# Patient Record
Sex: FEMALE | Race: BLACK OR AFRICAN AMERICAN | Employment: OTHER | URBAN - METROPOLITAN AREA
[De-identification: names, ages, dates, MRNs, and addresses within clinical notes are randomized per-mention and may not be internally consistent; named-entity substitution may affect disease eponyms.]

---

## 2017-03-09 ENCOUNTER — HOSPITAL ENCOUNTER (OUTPATIENT)
Dept: ULTRASOUND IMAGING | Age: 42
Discharge: HOME | End: 2017-03-09
Attending: SPECIALIST | Admitting: SPECIALIST

## 2017-03-09 DIAGNOSIS — R10.13 ABDOMINAL PAIN, EPIGASTRIC: ICD-10-CM

## 2017-03-31 ENCOUNTER — HOSPITAL ENCOUNTER (OUTPATIENT)
Dept: GENERAL RADIOLOGY | Age: 42
Discharge: HOME | End: 2017-03-31
Attending: SPECIALIST | Admitting: SPECIALIST

## 2017-03-31 DIAGNOSIS — R10.13 ABDOMINAL PAIN, EPIGASTRIC: ICD-10-CM

## 2018-09-22 ENCOUNTER — APPOINTMENT (OUTPATIENT)
Dept: GENERAL RADIOLOGY | Age: 43
End: 2018-09-22
Payer: COMMERCIAL

## 2018-09-22 ENCOUNTER — HOSPITAL ENCOUNTER (EMERGENCY)
Age: 43
Discharge: HOME | End: 2018-09-22
Payer: COMMERCIAL

## 2018-09-22 VITALS
HEART RATE: 88 BPM | DIASTOLIC BLOOD PRESSURE: 90 MMHG | WEIGHT: 114 LBS | SYSTOLIC BLOOD PRESSURE: 138 MMHG | RESPIRATION RATE: 16 BRPM | TEMPERATURE: 98.5 F | HEIGHT: 66 IN | BODY MASS INDEX: 18.32 KG/M2 | OXYGEN SATURATION: 100 %

## 2018-09-22 DIAGNOSIS — M79.601 RIGHT ARM PAIN: Primary | ICD-10-CM

## 2018-09-22 PROCEDURE — 73030 X-RAY EXAM OF SHOULDER: CPT

## 2018-09-22 PROCEDURE — 73080 X-RAY EXAM OF ELBOW: CPT

## 2018-09-22 PROCEDURE — 99283 EMERGENCY DEPT VISIT LOW MDM: CPT

## 2018-09-22 ASSESSMENT — PAIN DESCRIPTION - LOCATION: LOCATION: ARM;ELBOW;SHOULDER

## 2018-09-22 ASSESSMENT — PAIN DESCRIPTION - ORIENTATION: ORIENTATION: RIGHT

## 2018-09-22 ASSESSMENT — PAIN SCALES - GENERAL: PAINLEVEL_OUTOF10: 9

## 2018-09-22 ASSESSMENT — PAIN DESCRIPTION - PAIN TYPE: TYPE: ACUTE PAIN

## 2018-09-22 NOTE — ED PROVIDER NOTES
eMERGENCY dEPARTMENT eNCOUnter      PCP: Marija Barahona MD    CHIEF COMPLAINT    Chief Complaint   Patient presents with    Arm Pain     R arm injury after trying to catch her grandmother from sliding out of a recliner, weighs approx 400lb       HPI    Felix Tubbs is a 37 y.o. female who presents with Concerns for right arm pain starting yesterday. States that she helps take care of her grandmother who weighs approximately 400 pounds she was sliding out of her recliner chair and she had to help put her back into the chair. She's had pain to her right arm since. Took some Tylenol today. She is allergic to ibuprofen, denies any numbness or tingling has a history of rotator cuff problem on this side but has not had any calms with her biceps area which is where she is having her pain. Skin intact. No distal numbness, tingling, weakness, or functional deficit. No other pain. REVIEW OF SYSTEMS    General: Denies fever or constitutional symptoms. Cardiac: Denies chest wall injury or chest pain  Pulmonary: Denies chest wall injury or shortness of breath  GI: Denies abdomen injury or abdominal pain  Musculoskeletal:  Denies any other musculoskeletal pain or injuries, including chest wall and back. Skin:  Skin intact. Lymphatics:  No nodules or streaks.       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

## 2018-09-26 ENCOUNTER — HOSPITAL ENCOUNTER (OUTPATIENT)
Age: 43
Discharge: HOME | End: 2018-09-26
Payer: COMMERCIAL

## 2018-09-26 LAB
ALBUMIN SERPL-MCNC: 4.5 GM/DL (ref 3.4–5)
ALP BLD-CCNC: 96 IU/L (ref 40–129)
ALT SERPL-CCNC: 10 U/L (ref 10–40)
ANION GAP SERPL CALCULATED.3IONS-SCNC: 12 MMOL/L (ref 4–16)
AST SERPL-CCNC: 19 IU/L (ref 15–37)
BACTERIA: ABNORMAL /HPF
BILIRUB SERPL-MCNC: 0.4 MG/DL (ref 0–1)
BILIRUBIN URINE: NEGATIVE MG/DL
BLOOD, URINE: ABNORMAL
BUN BLDV-MCNC: 11 MG/DL (ref 6–23)
CALCIUM SERPL-MCNC: 9.1 MG/DL (ref 8.3–10.6)
CHLORIDE BLD-SCNC: 101 MMOL/L (ref 99–110)
CLARITY: ABNORMAL
CO2: 25 MMOL/L (ref 21–32)
COLOR: YELLOW
CREAT SERPL-MCNC: 1.4 MG/DL (ref 0.6–1.1)
ERYTHROCYTE SEDIMENTATION RATE: 15 MM/HR (ref 0–20)
ESTIMATED AVERAGE GLUCOSE: 108 MG/DL
GFR AFRICAN AMERICAN: 50 ML/MIN/1.73M2
GFR NON-AFRICAN AMERICAN: 41 ML/MIN/1.73M2
GLUCOSE BLD-MCNC: 102 MG/DL (ref 70–99)
GLUCOSE, URINE: NEGATIVE MG/DL
HBA1C MFR BLD: 5.4 % (ref 4.2–6.3)
HCT VFR BLD CALC: 47.7 % (ref 37–47)
HEMOGLOBIN: 15.1 GM/DL (ref 12.5–16)
KETONES, URINE: NEGATIVE MG/DL
LEUKOCYTE ESTERASE, URINE: NEGATIVE
MAGNESIUM: 2.3 MG/DL (ref 1.8–2.4)
MCH RBC QN AUTO: 31.8 PG (ref 27–31)
MCHC RBC AUTO-ENTMCNC: 31.7 % (ref 32–36)
MCV RBC AUTO: 100.4 FL (ref 78–100)
MUCUS: ABNORMAL HPF
NITRITE URINE, QUANTITATIVE: POSITIVE
PDW BLD-RTO: 12.2 % (ref 11.7–14.9)
PH, URINE: 5 (ref 5–8)
PLATELET # BLD: 261 K/CU MM (ref 140–440)
PMV BLD AUTO: 9.9 FL (ref 7.5–11.1)
POTASSIUM SERPL-SCNC: 3.6 MMOL/L (ref 3.5–5.1)
PROTEIN UA: 30 MG/DL
RBC # BLD: 4.75 M/CU MM (ref 4.2–5.4)
RBC URINE: 8 /HPF (ref 0–6)
SODIUM BLD-SCNC: 138 MMOL/L (ref 135–145)
SPECIFIC GRAVITY UA: 1.02 (ref 1–1.03)
SQUAMOUS EPITHELIAL: 1 /HPF
T4 FREE: 0.98 NG/DL (ref 0.9–1.8)
TOTAL PROTEIN: 7.3 GM/DL (ref 6.4–8.2)
TRICHOMONAS: ABNORMAL /HPF
TSH HIGH SENSITIVITY: 0.92 UIU/ML (ref 0.27–4.2)
URIC ACID: 4.5 MG/DL (ref 2.6–6)
UROBILINOGEN, URINE: 1 MG/DL (ref 0.2–1)
VITAMIN D 25-HYDROXY: 17.02 NG/ML
WBC # BLD: 5.2 K/CU MM (ref 4–10.5)
WBC UA: <1 /HPF (ref 0–5)

## 2018-09-26 PROCEDURE — 85652 RBC SED RATE AUTOMATED: CPT

## 2018-09-26 PROCEDURE — 36415 COLL VENOUS BLD VENIPUNCTURE: CPT

## 2018-09-26 PROCEDURE — 83735 ASSAY OF MAGNESIUM: CPT

## 2018-09-26 PROCEDURE — 81001 URINALYSIS AUTO W/SCOPE: CPT

## 2018-09-26 PROCEDURE — 85027 COMPLETE CBC AUTOMATED: CPT

## 2018-09-26 PROCEDURE — 84439 ASSAY OF FREE THYROXINE: CPT

## 2018-09-26 PROCEDURE — 82306 VITAMIN D 25 HYDROXY: CPT

## 2018-09-26 PROCEDURE — 80053 COMPREHEN METABOLIC PANEL: CPT

## 2018-09-26 PROCEDURE — 84443 ASSAY THYROID STIM HORMONE: CPT

## 2018-09-26 PROCEDURE — 83036 HEMOGLOBIN GLYCOSYLATED A1C: CPT

## 2018-09-26 PROCEDURE — 84550 ASSAY OF BLOOD/URIC ACID: CPT

## 2018-10-29 ENCOUNTER — APPOINTMENT (OUTPATIENT)
Dept: GENERAL RADIOLOGY | Age: 43
End: 2018-10-29
Payer: COMMERCIAL

## 2018-10-29 ENCOUNTER — HOSPITAL ENCOUNTER (EMERGENCY)
Age: 43
Discharge: HOME | End: 2018-10-29
Payer: COMMERCIAL

## 2018-10-29 VITALS
TEMPERATURE: 98.6 F | BODY MASS INDEX: 18.32 KG/M2 | OXYGEN SATURATION: 100 % | RESPIRATION RATE: 16 BRPM | SYSTOLIC BLOOD PRESSURE: 151 MMHG | DIASTOLIC BLOOD PRESSURE: 96 MMHG | HEIGHT: 66 IN | HEART RATE: 94 BPM | WEIGHT: 114 LBS

## 2018-10-29 DIAGNOSIS — S69.92XA INJURY OF LEFT HAND, INITIAL ENCOUNTER: Primary | ICD-10-CM

## 2018-10-29 PROCEDURE — 73130 X-RAY EXAM OF HAND: CPT

## 2018-10-29 PROCEDURE — 6370000000 HC RX 637 (ALT 250 FOR IP): Performed by: PHYSICIAN ASSISTANT

## 2018-10-29 PROCEDURE — 73110 X-RAY EXAM OF WRIST: CPT

## 2018-10-29 PROCEDURE — 99283 EMERGENCY DEPT VISIT LOW MDM: CPT

## 2018-10-29 RX ORDER — HYDROCODONE BITARTRATE AND ACETAMINOPHEN 5; 325 MG/1; MG/1
1 TABLET ORAL ONCE
Status: COMPLETED | OUTPATIENT
Start: 2018-10-29 | End: 2018-10-29

## 2018-10-29 RX ORDER — HYDROCODONE BITARTRATE AND ACETAMINOPHEN 5; 325 MG/1; MG/1
1 TABLET ORAL EVERY 8 HOURS PRN
Qty: 8 TABLET | Refills: 0 | Status: SHIPPED | OUTPATIENT
Start: 2018-10-29 | End: 2018-11-05

## 2018-10-29 RX ADMIN — HYDROCODONE BITARTRATE AND ACETAMINOPHEN 1 TABLET: 5; 325 TABLET ORAL at 22:45

## 2018-10-29 ASSESSMENT — PAIN DESCRIPTION - ORIENTATION: ORIENTATION: LEFT

## 2018-10-29 ASSESSMENT — PAIN DESCRIPTION - LOCATION: LOCATION: WRIST

## 2018-10-29 ASSESSMENT — PAIN SCALES - GENERAL
PAINLEVEL_OUTOF10: 10
PAINLEVEL_OUTOF10: 6

## 2018-10-29 ASSESSMENT — PAIN DESCRIPTION - PAIN TYPE: TYPE: ACUTE PAIN

## 2018-11-14 ENCOUNTER — OFFICE VISIT (OUTPATIENT)
Dept: ORTHOPEDIC SURGERY | Age: 43
End: 2018-11-14
Payer: COMMERCIAL

## 2018-11-14 VITALS — BODY MASS INDEX: 17.36 KG/M2 | RESPIRATION RATE: 12 BRPM | HEIGHT: 66 IN | WEIGHT: 108 LBS

## 2018-11-14 DIAGNOSIS — M75.81 RIGHT ROTATOR CUFF TENDONITIS: Primary | ICD-10-CM

## 2018-11-14 PROCEDURE — G8484 FLU IMMUNIZE NO ADMIN: HCPCS | Performed by: PHYSICIAN ASSISTANT

## 2018-11-14 PROCEDURE — 99243 OFF/OP CNSLTJ NEW/EST LOW 30: CPT | Performed by: PHYSICIAN ASSISTANT

## 2018-11-14 PROCEDURE — G8419 CALC BMI OUT NRM PARAM NOF/U: HCPCS | Performed by: PHYSICIAN ASSISTANT

## 2018-11-14 PROCEDURE — G8427 DOCREV CUR MEDS BY ELIG CLIN: HCPCS | Performed by: PHYSICIAN ASSISTANT

## 2018-11-14 PROCEDURE — 20610 DRAIN/INJ JOINT/BURSA W/O US: CPT | Performed by: PHYSICIAN ASSISTANT

## 2018-11-14 ASSESSMENT — ENCOUNTER SYMPTOMS
EYE DISCHARGE: 1
BACK PAIN: 1
DIARRHEA: 1
WHEEZING: 1
ABDOMINAL PAIN: 1
BLOOD IN STOOL: 1
VOMITING: 1
SINUS PAIN: 1
COUGH: 1
EYE REDNESS: 1
CONSTIPATION: 1
ROS SKIN COMMENTS: ITCHING
NAUSEA: 1

## 2018-11-14 NOTE — PROGRESS NOTES
Review of Systems   Constitutional: Positive for activity change and chills. HENT: Positive for sinus pain. Eyes: Positive for discharge, redness and visual disturbance. Respiratory: Positive for cough and wheezing. Cardiovascular: Positive for palpitations and leg swelling. Gastrointestinal: Positive for abdominal pain, blood in stool, constipation, diarrhea, nausea and vomiting. Genitourinary: Positive for frequency. Musculoskeletal: Positive for arthralgias, back pain and neck pain. Skin: Negative for rash and wound. itching   Allergic/Immunologic: Positive for environmental allergies. Neurological: Positive for dizziness, numbness and headaches. Psychiatric/Behavioral: Positive for dysphoric mood and sleep disturbance. The patient is nervous/anxious. HPI:  Babita Foreman is a 37y.o. year old female who complains of 8/10 sharp, aching global Right shoulder pain, with weakness. She states that this has been going on for several months and she has no known injury. She states that the pain does radiate down her arm to her elbow and sometimes to her thumb. Occasionally she states that she does have some tingling and numbness in the arm. She has been told that she has cervical spine arthritis and she is unsure if this is coming from her neck or her shoulder currently. She is right-hand dominant. She has not done any therapy and has not had an injection before in that shoulder. Active motion makes her shoulder pain worse. Resting the shoulder does help mildly with the pain.      The patient was referred to our office from Dr. 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 No pertinent family history. Social History     Social History    Marital status:      Spouse name: N/A    Number of children: N/A    Years of education: N/A     Social History Main Topics    Smoking status: Current Every Day Smoker     Packs/day: 1.00     Years: 20.00     Types: Cigarettes    Smokeless tobacco: Never Used    Alcohol use Yes    Drug use: No    Sexual activity: No     Other Topics Concern    None     Social History Narrative    None       Current Outpatient Prescriptions   Medication Sig Dispense Refill    naproxen (NAPROSYN) 500 MG tablet Take 1 tablet by mouth 2 times daily 60 tablet 0    albuterol sulfate HFA (PROVENTIL HFA) 108 (90 BASE) MCG/ACT inhaler Inhale 2 puffs into the lungs every 6 hours as needed for Wheezing or Shortness of Breath 1 Inhaler 0    ondansetron (ZOFRAN ODT) 4 MG disintegrating tablet Take 1 tablet by mouth every 8 hours as needed for Nausea 15 tablet 0     No current facility-administered medications for this visit. Allergies   Allergen Reactions    Morphine And Related Hives    Ibuprofen Hives    Motrin [Ibuprofen Micronized] Hives       Review of Systems:  See above      Physical Exam:   Resp 12   Ht 5' 6\" (1.676 m)   Wt 108 lb (49 kg)   BMI 17.43 kg/m²    She demonstrates normal gait and station. Gen/Psych: Examination reveals a pleasant individual in no acute distress. The patient is oriented to time, place and person. The patient's mood and affect are appropriate.      Lymph: No enlarged supraclavicular lymph nodes on the right or left side.     Skin intact over the left and right upper extremity as well as over the neck and head region. There are no rashes, ulcerations, lesions.   There is a small hypertrophic scar on her right shoulder over the posterior lateral acromial corner.     Vascular:  2+ radial pulse in the right upper extremity, no varicosities in the left or right upper extremity      Right shoulder exam:

## 2018-11-23 ENCOUNTER — HOSPITAL ENCOUNTER (OUTPATIENT)
Dept: PHYSICAL THERAPY | Age: 43
Setting detail: THERAPIES SERIES
Discharge: HOME | End: 2018-11-23
Payer: COMMERCIAL

## 2018-11-23 PROCEDURE — 97162 PT EVAL MOD COMPLEX 30 MIN: CPT

## 2018-11-23 PROCEDURE — G8984 CARRY CURRENT STATUS: HCPCS

## 2018-11-23 PROCEDURE — G8985 CARRY GOAL STATUS: HCPCS

## 2018-11-23 PROCEDURE — 97016 VASOPNEUMATIC DEVICE THERAPY: CPT

## 2018-11-23 PROCEDURE — 97110 THERAPEUTIC EXERCISES: CPT

## 2018-11-23 ASSESSMENT — PAIN SCALES - GENERAL: PAINLEVEL_OUTOF10: 7

## 2018-11-23 ASSESSMENT — PAIN DESCRIPTION - LOCATION: LOCATION: ARM;SHOULDER;NECK

## 2018-11-23 ASSESSMENT — PAIN DESCRIPTION - PAIN TYPE: TYPE: ACUTE PAIN

## 2018-11-23 ASSESSMENT — PAIN DESCRIPTION - PROGRESSION: CLINICAL_PROGRESSION: NOT CHANGED

## 2018-11-23 ASSESSMENT — PAIN DESCRIPTION - FREQUENCY: FREQUENCY: CONTINUOUS

## 2018-11-23 ASSESSMENT — PAIN DESCRIPTION - ONSET: ONSET: SUDDEN

## 2018-11-23 ASSESSMENT — PAIN DESCRIPTION - ORIENTATION: ORIENTATION: RIGHT

## 2018-11-23 NOTE — FLOWSHEET NOTE
Outpatient Physical Therapy           [place]           [x] Phone: [phone]   Fax: [phone]  [facility] Marin[facility]           [] Phone: [phone]   Fax: [phone]    Physical Therapy Daily Treatment Note  Date:  2018    Patient Name:  Flor Barrett    :  1975  MRN: [id number]  Restrictions/Precautions: Other position/activity restrictions: none  Diagnosis:   Diagnosis: R shoulder pain  Date of Surgery:   Treatment Diagnosis: Treatment Diagnosis: R shoulder/arm pain, stiffness, weakness    Insurance/Certification information: [facility]  30 Y  Referring Physician:  Referring Practitioner: Gerard Rodriguez Doctor Visit:    Plan of care signed (Y/N):  pending  Visit# / total visits:    POC  Pain level: 7/10   Goals:       Short term goals  Time Frame for Short term goals: 3 weeks, 18  Short term goal 1: Pt will be able to report at least 25% reduction in pain/symptoms or will consider return to doc  Short term goal 2: Pt will be able to slowly advance ROM and strength w/o increased pain  Short term goal 3: Pt will be able to report improved sleeping at night   Short term goal 4: Check neural tension tests prn when shoulder pain allows  Long term goals  Time Frame for Long term goals : 6 weeks, 19  Long term goal 1: Pt will be independent w/ home program and be able to continue to manage her pain on her own at home  Long term goal 2: Pt will be able reach [place] and behind again w/ min to no pain  Long term goal 3: Pt will have AROM and strength in R UE that is at least 90% of L UE for usual activity  Long term goal 4: Pt will be able to carry groceries w/o pain         Subjective:  See eval       Any changes in Ambulatory Summary Sheet?   See eval      Objective:  See eval         Exercises:  Exercise/Equipment 18 Date Date Date          Supine:         Cane ER/IR at 45°    10x       cane press up  10x      Cane flex  2x               bicep curl 10x, stretch at bottom       scap

## 2018-11-23 NOTE — PROGRESS NOTES
Physical Therapy  Initial Assessment  Date: 2018  Patient Name: Jj De La Cruz  MRN: [id number]  : 1975     Treatment Diagnosis: R shoulder/arm pain, stiffness, weakness    Restrictions  Position Activity Restriction  Other position/activity restrictions: none    Subjective   General  Chart Reviewed: Yes  Patient assessed for rehabilitation services?: Yes  Additional Pertinent Hx: pain for a couple months now, but also has bulging disc in neck, pt describes shoulder locking like arm out of socket since injection and pain is worse since then. shoulder xray maybe some tendonitis, No doc following neck other than PCP currently, neck pain for more than 10 years and was managing ok. Referring Practitioner: Ramsay  Diagnosis: R shoulder pain  Subjective  Subjective: pain increased by sitting w/ arm dangling, sleep is miserable and cannot lay on it, goes numb, especially if gets arm up under head. Also pain w/ reaching [place] and behind, cannot carry groceries. Is L handed. for neck uses heat and ice and some meds (min help w/ meds). relief w/ ice but heat makes worse--increases stingy pain  Pain Screening  Patient Currently in Pain: Yes  Pain Assessment  Pain Assessment: 0-10  Pain Level: 7 (max 10/10 and happens daily am and late day)  Pain Type: Acute pain  Pain Location: Arm; Shoulder;Neck  Pain Orientation: Right  Pain Radiating Towards: down arm just below elbow and can go into thumb and first finger  Pain Descriptors: Aching;Burning; Sharp;Dull;Stabbing; Throbbing;Numbness;Tingling;Pins and needles  Pain Frequency: Continuous  Pain Onset: Sudden  Clinical Progression: Not changed  Effect of Pain on Daily Activities: limits lifting, reaching, sleep, sitting all as above  Patient's Stated Pain Goal: No pain  Vital Signs  Patient Currently in Pain: Yes    Vision/Hearing  Vision  Vision: Impaired (reading)    Orientation  Orientation  Overall Orientation Status: Within Normal Limits    Social/Functional History  Social/Functional History  Lives With: Family  Type of Home: House  Occupation: On disability  Leisure & Hobbies: corn hole others hair, nini  Objective     Observation/Palpation  Posture: Good  Palpation: TTP all around humeral head, down tricep and at medial epicondyle, UT and rhomb too  Observation: guarding of shoulder noted and limits movement    PROM RUE (degrees)  RUE General PROM: supine flex and abd 123 w/ pain and instability, ER 65, IR 58 --more pain w/ IR   AROM RUE (degrees)  RUE General AROM: seated shoulder flex 110 w/ pain and feeling of instability and really pain on return, abd 65, ER scratch spine of scap w/ instability noted vs T6-7 on L, IR scratch just to sacrum vs T 12 on L also w/ pain, standing elbow ext limited 18°    Strength RUE  Comment: shoulder and elbow all 4+/5 w/ some pain,  weak but B  Strength LUE  Strength LUE: WNL  Strength Other  Other:  on dynamometer 15 kg L , 5 kg R  Tone RLE  RLE Tone: Hypertonic  Tone Description: R shoulder  Additional Measures  Special Tests: Neg cerv comp and distraction, some local pain w/ Spurling B but no radic pain, + Brownlee and Cross over, Speeds too. Laxity noted w/ mild sulcus and HH sitting ant in joint w/ AP testing, neg Smith, unable to check neural tension d/t limited shoulder ROM and pain  Other: bicep diminshed L, 1+ R, brachialias 1+ B, tricep diminished R but 1+ L  Sensation  Overall Sensation Status: Impaired  Light Touch: Partial deficits in the RUE (C5 nerve pattern distrubutioin)    Assessment   Conditions Requiring Skilled Therapeutic Intervention  Body structures, Functions, Activity limitations: Decreased functional mobility ; Decreased ADL status; Decreased ROM; Decreased strength;Decreased high-level IADLs  Assessment: Pt is a 36 yo female who presents w/ R shoulder and arm pain, occasional N/T. She has Hx of cervical pain and disc bulge but has been managed until recently.   R

## 2018-11-23 NOTE — PLAN OF CARE
Outpatient Physical Therapy           [place]           [x] Phone: [phone]   Fax: [phone]  [facility] Hilding           [] Phone: [phone]   Fax: [phone]     To: Referring Practitioner: Mary Franco    From: Esperanza Johnson, JERILYN     Patient: Alida Edwards       : 1975  Diagnosis: Diagnosis: R shoulder pain   Treatment Diagnosis: Treatment Diagnosis: R shoulder/arm pain, stiffness, weakness   Date: 2018    Physical Therapy Certification/Re-Certification Form  Dear Jose Juan Tsang,   The following patient has been evaluated for physical therapy services and for therapy to continue, insurance requires physician review of the treatment plan initially and every 90 days. Please review the attached evaluation and/or summary of the patient's plan of care, and verify that you agree therapy should continue by signing the attached document and sending it back to our office. Assessment:    Assessment: Pt is a 38 yo female who presents w/ R shoulder and arm pain, occasional N/T. She has Hx of cervical pain and disc bulge but has been managed until recently. R shoulder is very guarded and painful and position of humeral head in glenoid seems shifted anterior, she has limited ROM and strength and is very painful to touch and movement in R shoulder w/ increased winging of scap too. She appears to have some shoulder pathology but may still have underlying radicular symptoms as well. Her limitations are affecting her ability to perform her usual ADL;s, sleep and functional activity. She will benefit from PT to help reduce pain and guarding and then restore ROM and strength and invesitgate cervical component more as tolerated also. Prior to  pt had no R shoulder pain. Patient agrees with established plan of care and assisted in the development of their short term and long term goals. Patient had no adverse reaction with initial treatment and there are no barriers to learning.  Demonstrates no

## 2018-12-05 ENCOUNTER — HOSPITAL ENCOUNTER (EMERGENCY)
Age: 43
Discharge: HOME | End: 2018-12-05
Attending: EMERGENCY MEDICINE
Payer: COMMERCIAL

## 2018-12-05 ENCOUNTER — APPOINTMENT (OUTPATIENT)
Dept: CT IMAGING | Age: 43
End: 2018-12-05
Payer: COMMERCIAL

## 2018-12-05 VITALS
BODY MASS INDEX: 17.68 KG/M2 | SYSTOLIC BLOOD PRESSURE: 127 MMHG | HEART RATE: 85 BPM | TEMPERATURE: 98.2 F | DIASTOLIC BLOOD PRESSURE: 91 MMHG | RESPIRATION RATE: 15 BRPM | OXYGEN SATURATION: 99 % | HEIGHT: 66 IN | WEIGHT: 110 LBS

## 2018-12-05 DIAGNOSIS — T14.8XXA MUSCLE STRAIN: Primary | ICD-10-CM

## 2018-12-05 DIAGNOSIS — R51.9 ACUTE NONINTRACTABLE HEADACHE, UNSPECIFIED HEADACHE TYPE: ICD-10-CM

## 2018-12-05 LAB
ALBUMIN SERPL-MCNC: 4.6 GM/DL (ref 3.4–5)
ALP BLD-CCNC: 86 IU/L (ref 40–129)
ALT SERPL-CCNC: 15 U/L (ref 10–40)
ANION GAP SERPL CALCULATED.3IONS-SCNC: 12 MMOL/L (ref 4–16)
AST SERPL-CCNC: 19 IU/L (ref 15–37)
BASOPHILS ABSOLUTE: 0 K/CU MM
BASOPHILS RELATIVE PERCENT: 0.6 % (ref 0–1)
BILIRUB SERPL-MCNC: 0.5 MG/DL (ref 0–1)
BUN BLDV-MCNC: 12 MG/DL (ref 6–23)
CALCIUM SERPL-MCNC: 9.5 MG/DL (ref 8.3–10.6)
CHLORIDE BLD-SCNC: 101 MMOL/L (ref 99–110)
CO2: 26 MMOL/L (ref 21–32)
CREAT SERPL-MCNC: 0.8 MG/DL (ref 0.6–1.1)
DIFFERENTIAL TYPE: ABNORMAL
EOSINOPHILS ABSOLUTE: 0 K/CU MM
EOSINOPHILS RELATIVE PERCENT: 0.3 % (ref 0–3)
GFR AFRICAN AMERICAN: >60 ML/MIN/1.73M2
GFR NON-AFRICAN AMERICAN: >60 ML/MIN/1.73M2
GLUCOSE BLD-MCNC: 97 MG/DL (ref 70–99)
HCT VFR BLD CALC: 47.4 % (ref 37–47)
HEMOGLOBIN: 15.3 GM/DL (ref 12.5–16)
IMMATURE NEUTROPHIL %: 0.5 % (ref 0–0.43)
LYMPHOCYTES ABSOLUTE: 1.5 K/CU MM
LYMPHOCYTES RELATIVE PERCENT: 22.7 % (ref 24–44)
MCH RBC QN AUTO: 32.3 PG (ref 27–31)
MCHC RBC AUTO-ENTMCNC: 32.3 % (ref 32–36)
MCV RBC AUTO: 100.2 FL (ref 78–100)
MONOCYTES ABSOLUTE: 0.6 K/CU MM
MONOCYTES RELATIVE PERCENT: 9.3 % (ref 0–4)
NUCLEATED RBC %: 0 %
PDW BLD-RTO: 13.2 % (ref 11.7–14.9)
PLATELET # BLD: 263 K/CU MM (ref 140–440)
PMV BLD AUTO: 8.9 FL (ref 7.5–11.1)
POTASSIUM SERPL-SCNC: 4.1 MMOL/L (ref 3.5–5.1)
RBC # BLD: 4.73 M/CU MM (ref 4.2–5.4)
SEGMENTED NEUTROPHILS ABSOLUTE COUNT: 4.3 K/CU MM
SEGMENTED NEUTROPHILS RELATIVE PERCENT: 66.6 % (ref 36–66)
SODIUM BLD-SCNC: 139 MMOL/L (ref 135–145)
TOTAL IMMATURE NEUTOROPHIL: 0.03 K/CU MM
TOTAL NUCLEATED RBC: 0 K/CU MM
TOTAL PROTEIN: 7.6 GM/DL (ref 6.4–8.2)
WBC # BLD: 6.4 K/CU MM (ref 4–10.5)

## 2018-12-05 PROCEDURE — 99284 EMERGENCY DEPT VISIT MOD MDM: CPT

## 2018-12-05 PROCEDURE — 80053 COMPREHEN METABOLIC PANEL: CPT

## 2018-12-05 PROCEDURE — 85025 COMPLETE CBC W/AUTO DIFF WBC: CPT

## 2018-12-05 PROCEDURE — 6370000000 HC RX 637 (ALT 250 FOR IP): Performed by: EMERGENCY MEDICINE

## 2018-12-05 PROCEDURE — 70450 CT HEAD/BRAIN W/O DYE: CPT

## 2018-12-05 PROCEDURE — 36415 COLL VENOUS BLD VENIPUNCTURE: CPT

## 2018-12-05 RX ORDER — CYCLOBENZAPRINE HCL 10 MG
10 TABLET ORAL ONCE
Status: COMPLETED | OUTPATIENT
Start: 2018-12-05 | End: 2018-12-05

## 2018-12-05 RX ORDER — ACETAMINOPHEN 500 MG
1000 TABLET ORAL ONCE
Status: COMPLETED | OUTPATIENT
Start: 2018-12-05 | End: 2018-12-05

## 2018-12-05 RX ADMIN — CYCLOBENZAPRINE HYDROCHLORIDE 10 MG: 10 TABLET, FILM COATED ORAL at 16:42

## 2018-12-05 RX ADMIN — ACETAMINOPHEN 1000 MG: 500 TABLET, FILM COATED ORAL at 16:42

## 2018-12-05 ASSESSMENT — PAIN SCALES - GENERAL
PAINLEVEL_OUTOF10: 8
PAINLEVEL_OUTOF10: 8

## 2018-12-05 ASSESSMENT — PAIN DESCRIPTION - LOCATION: LOCATION: HEAD

## 2018-12-05 ASSESSMENT — PAIN DESCRIPTION - PAIN TYPE: TYPE: ACUTE PAIN

## 2018-12-05 NOTE — ED PROVIDER NOTES
status: Current Every Day Smoker     Packs/day: 1.00     Years: 20.00     Types: Cigarettes    Smokeless tobacco: Never Used    Alcohol use Yes    Drug use: Yes     Types: Marijuana    Sexual activity: No     Other Topics Concern    Not on file     Social History Narrative    No narrative on file     No current facility-administered medications for this encounter. Current Outpatient Prescriptions   Medication Sig Dispense Refill    naproxen (NAPROSYN) 500 MG tablet Take 1 tablet by mouth 2 times daily 60 tablet 0    albuterol sulfate HFA (PROVENTIL HFA) 108 (90 BASE) MCG/ACT inhaler Inhale 2 puffs into the lungs every 6 hours as needed for Wheezing or Shortness of Breath 1 Inhaler 0    ondansetron (ZOFRAN ODT) 4 MG disintegrating tablet Take 1 tablet by mouth every 8 hours as needed for Nausea 15 tablet 0     Allergies   Allergen Reactions    Morphine And Related Hives    Ibuprofen Hives    Motrin [Ibuprofen Micronized] Hives       Nursing Notes Reviewed    Physical Exam:  ED Triage Vitals [12/05/18 1615]   Enc Vitals Group      BP (!) 146/88      Pulse 90      Resp 14      Temp 98.2 °F (36.8 °C)      Temp Source Oral      SpO2 100 %      Weight 110 lb (49.9 kg)      Height 5' 6\" (1.676 m)      Head Circumference       Peak Flow       Pain Score       Pain Loc       Pain Edu? Excl. in [address]? My pulse ox interpretation is - normal    General appearance:  No acute distress. Skin:  Warm. Dry. No rash. Eye:  Extraocular movements intact. Ears, nose, mouth and throat:  Oral mucosa moist   Neck:  Trachea midline. Extremity:  No swelling. Normal ROM     Heart:  Regular rate and rhythm, normal S1 & S2, no extra heart sounds. Perfusion:  intact   Back: No midline spinal tenderness or step-off she does have right-sided paracervical muscle tenderness to palpation extending to the base of her occiput, this does reproduce the patient's pain.   Respiratory:  Lungs clear to auscultation

## 2018-12-05 NOTE — ED NOTES
Pt put on call light, pt screaming and yelling, stating that she is going to come out of room and get in Brayan Blum (unit clerk) face and cuss her out. Pt states that she called on the phone and Brayan Blum answered and would not give her any information on her grandmother who is in another room in ED. Pt states she is the POA and she wants to know what testing is being done on her grandmother. Pt notified of HIPPA laws and we are not allowed to give information over the phone and there is no way to verify over the phone who is calling. Charge nurse [facility] notified.       Mychal Martinez RN  12/05/18 5180

## 2018-12-20 ENCOUNTER — HOSPITAL ENCOUNTER (EMERGENCY)
Age: 43
Discharge: HOME | End: 2018-12-20
Payer: COMMERCIAL

## 2018-12-20 VITALS
RESPIRATION RATE: 14 BRPM | TEMPERATURE: 97.9 F | DIASTOLIC BLOOD PRESSURE: 80 MMHG | OXYGEN SATURATION: 98 % | HEART RATE: 84 BPM | BODY MASS INDEX: 17.04 KG/M2 | HEIGHT: 66 IN | SYSTOLIC BLOOD PRESSURE: 126 MMHG | WEIGHT: 106 LBS

## 2018-12-20 DIAGNOSIS — R51.9 ACUTE NONINTRACTABLE HEADACHE, UNSPECIFIED HEADACHE TYPE: Primary | ICD-10-CM

## 2018-12-20 PROCEDURE — 99283 EMERGENCY DEPT VISIT LOW MDM: CPT

## 2018-12-20 PROCEDURE — 6370000000 HC RX 637 (ALT 250 FOR IP): Performed by: PHYSICIAN ASSISTANT

## 2018-12-20 PROCEDURE — 96374 THER/PROPH/DIAG INJ IV PUSH: CPT

## 2018-12-20 PROCEDURE — 2580000003 HC RX 258: Performed by: PHYSICIAN ASSISTANT

## 2018-12-20 PROCEDURE — 6360000002 HC RX W HCPCS: Performed by: PHYSICIAN ASSISTANT

## 2018-12-20 PROCEDURE — 96361 HYDRATE IV INFUSION ADD-ON: CPT

## 2018-12-20 RX ORDER — DIPHENHYDRAMINE HCL 25 MG
50 TABLET ORAL ONCE
Status: COMPLETED | OUTPATIENT
Start: 2018-12-20 | End: 2018-12-20

## 2018-12-20 RX ORDER — 0.9 % SODIUM CHLORIDE 0.9 %
1000 INTRAVENOUS SOLUTION INTRAVENOUS ONCE
Status: COMPLETED | OUTPATIENT
Start: 2018-12-20 | End: 2018-12-20

## 2018-12-20 RX ORDER — BUTALBITAL, ACETAMINOPHEN AND CAFFEINE 50; 325; 40 MG/1; MG/1; MG/1
2 TABLET ORAL ONCE
Status: COMPLETED | OUTPATIENT
Start: 2018-12-20 | End: 2018-12-20

## 2018-12-20 RX ADMIN — DIPHENHYDRAMINE HCL 50 MG: 25 TABLET ORAL at 03:34

## 2018-12-20 RX ADMIN — SODIUM CHLORIDE 1000 ML: 9 INJECTION, SOLUTION INTRAVENOUS at 03:34

## 2018-12-20 RX ADMIN — PROCHLORPERAZINE EDISYLATE 10 MG: 5 INJECTION INTRAMUSCULAR; INTRAVENOUS at 03:34

## 2018-12-20 RX ADMIN — BUTALBITAL, ACETAMINOPHEN, AND CAFFEINE 2 TABLET: 50; 325; 40 TABLET ORAL at 03:34

## 2018-12-20 ASSESSMENT — PAIN SCALES - GENERAL
PAINLEVEL_OUTOF10: 10
PAINLEVEL_OUTOF10: 10

## 2018-12-20 ASSESSMENT — PAIN DESCRIPTION - LOCATION: LOCATION: HEAD

## 2018-12-20 ASSESSMENT — PAIN DESCRIPTION - PAIN TYPE: TYPE: ACUTE PAIN

## 2019-03-21 ENCOUNTER — HOSPITAL ENCOUNTER (OUTPATIENT)
Age: 44
Discharge: HOME | End: 2019-03-21
Payer: COMMERCIAL

## 2019-03-21 LAB
BACTERIA: ABNORMAL /HPF
BILIRUBIN URINE: NEGATIVE MG/DL
BLOOD, URINE: NEGATIVE
CLARITY: CLEAR
COLOR: YELLOW
GLUCOSE, URINE: NEGATIVE MG/DL
KETONES, URINE: NEGATIVE MG/DL
LEUKOCYTE ESTERASE, URINE: ABNORMAL
MUCUS: ABNORMAL HPF
NITRITE URINE, QUANTITATIVE: NEGATIVE
PH, URINE: 5 (ref 5–8)
PROTEIN UA: NEGATIVE MG/DL
RBC URINE: 12 /HPF (ref 0–6)
SPECIFIC GRAVITY UA: 1.02 (ref 1–1.03)
SQUAMOUS EPITHELIAL: 6 /HPF
TRICHOMONAS: ABNORMAL /HPF
UROBILINOGEN, URINE: 1 MG/DL (ref 0.2–1)
WBC UA: 1 /HPF (ref 0–5)
YEAST: ABNORMAL /HPF

## 2019-03-21 PROCEDURE — 81001 URINALYSIS AUTO W/SCOPE: CPT

## 2019-03-21 PROCEDURE — 87086 URINE CULTURE/COLONY COUNT: CPT

## 2019-03-22 LAB
CULTURE: NORMAL
Lab: NORMAL
SPECIMEN: NORMAL

## 2019-03-28 ENCOUNTER — HOSPITAL ENCOUNTER (OUTPATIENT)
Dept: MRI IMAGING | Age: 44
Discharge: HOME | End: 2019-03-28
Payer: COMMERCIAL

## 2019-03-28 DIAGNOSIS — G44.221 CHRONIC TENSION-TYPE HEADACHE, INTRACTABLE: ICD-10-CM

## 2019-03-28 DIAGNOSIS — G43.909 ACUTE CONFUSIONAL MIGRAINE: ICD-10-CM

## 2019-03-28 DIAGNOSIS — G44.001 INTRACTABLE CLUSTER HEADACHE SYNDROME, UNSPECIFIED CHRONICITY PATTERN: ICD-10-CM

## 2019-03-28 PROCEDURE — 70553 MRI BRAIN STEM W/O & W/DYE: CPT

## 2019-03-28 PROCEDURE — 6360000004 HC RX CONTRAST MEDICATION: Performed by: FAMILY MEDICINE

## 2019-03-28 PROCEDURE — A9579 GAD-BASE MR CONTRAST NOS,1ML: HCPCS | Performed by: FAMILY MEDICINE

## 2019-03-28 RX ADMIN — GADOTERIDOL 9 ML: 279.3 INJECTION, SOLUTION INTRAVENOUS at 10:57

## 2019-03-29 ENCOUNTER — HOSPITAL ENCOUNTER (OUTPATIENT)
Dept: PHYSICAL THERAPY | Age: 44
Discharge: HOME | End: 2019-03-29

## 2019-03-29 NOTE — PROGRESS NOTES
To: Referring Practitioner: Duane Olivo                                          From: Shellie Baugh PT             Patient: Vu Ayala                                                   : 1975  Diagnosis: Diagnosis: R shoulder pain           Treatment Diagnosis: Treatment Diagnosis: R shoulder/arm pain, stiffness, weakness     3/29/2019       Moises Claudio   Pt never returned after initial eval so we are unable to assess any further and it has now been over 30 days and we've not heard from pt so we will formally d/c at this time.         Shellie Baugh PT, MPT, ATC     3/29/2019, 12:48 PM

## 2019-04-06 ENCOUNTER — HOSPITAL ENCOUNTER (EMERGENCY)
Age: 44
Discharge: HOME | End: 2019-04-06
Attending: EMERGENCY MEDICINE
Payer: COMMERCIAL

## 2019-04-06 ENCOUNTER — APPOINTMENT (OUTPATIENT)
Dept: GENERAL RADIOLOGY | Age: 44
End: 2019-04-06
Payer: COMMERCIAL

## 2019-04-06 VITALS
SYSTOLIC BLOOD PRESSURE: 116 MMHG | DIASTOLIC BLOOD PRESSURE: 78 MMHG | HEIGHT: 66 IN | WEIGHT: 103.5 LBS | RESPIRATION RATE: 16 BRPM | OXYGEN SATURATION: 98 % | BODY MASS INDEX: 16.63 KG/M2 | TEMPERATURE: 98.4 F | HEART RATE: 88 BPM

## 2019-04-06 DIAGNOSIS — M25.522 LEFT ELBOW PAIN: ICD-10-CM

## 2019-04-06 DIAGNOSIS — J40 BRONCHITIS: Primary | ICD-10-CM

## 2019-04-06 PROCEDURE — 94761 N-INVAS EAR/PLS OXIMETRY MLT: CPT

## 2019-04-06 PROCEDURE — 71045 X-RAY EXAM CHEST 1 VIEW: CPT

## 2019-04-06 PROCEDURE — 94640 AIRWAY INHALATION TREATMENT: CPT

## 2019-04-06 PROCEDURE — 73080 X-RAY EXAM OF ELBOW: CPT

## 2019-04-06 PROCEDURE — 6370000000 HC RX 637 (ALT 250 FOR IP): Performed by: EMERGENCY MEDICINE

## 2019-04-06 PROCEDURE — 99283 EMERGENCY DEPT VISIT LOW MDM: CPT

## 2019-04-06 RX ORDER — PROCHLORPERAZINE MALEATE 5 MG/1
5 TABLET ORAL EVERY 6 HOURS PRN
COMMUNITY

## 2019-04-06 RX ORDER — BUTALBITAL, ASPIRIN, AND CAFFEINE 325; 50; 40 MG/1; MG/1; MG/1
CAPSULE ORAL EVERY 4 HOURS PRN
COMMUNITY
End: 2022-09-06

## 2019-04-06 RX ORDER — PREDNISONE 20 MG/1
60 TABLET ORAL ONCE
Status: COMPLETED | OUTPATIENT
Start: 2019-04-06 | End: 2019-04-06

## 2019-04-06 RX ORDER — CALCIUM POLYCARBOPHIL 625 MG 625 MG/1
625 TABLET ORAL DAILY
COMMUNITY

## 2019-04-06 RX ORDER — IPRATROPIUM BROMIDE AND ALBUTEROL SULFATE 2.5; .5 MG/3ML; MG/3ML
1 SOLUTION RESPIRATORY (INHALATION) ONCE
Status: COMPLETED | OUTPATIENT
Start: 2019-04-06 | End: 2019-04-06

## 2019-04-06 RX ORDER — LORAZEPAM 0.5 MG/1
0.5 TABLET ORAL EVERY 6 HOURS PRN
COMMUNITY

## 2019-04-06 RX ORDER — QUETIAPINE FUMARATE 100 MG/1
100 TABLET, FILM COATED ORAL NIGHTLY
COMMUNITY

## 2019-04-06 RX ORDER — GUAIFENESIN/DEXTROMETHORPHAN 100-10MG/5
5 SYRUP ORAL ONCE
Status: COMPLETED | OUTPATIENT
Start: 2019-04-06 | End: 2019-04-06

## 2019-04-06 RX ORDER — DIPHENHYDRAMINE HCL 25 MG
25 CAPSULE ORAL EVERY 6 HOURS PRN
COMMUNITY
End: 2021-02-10

## 2019-04-06 RX ORDER — PREDNISONE 10 MG/1
60 TABLET ORAL DAILY
Qty: 30 TABLET | Refills: 0 | Status: SHIPPED | OUTPATIENT
Start: 2019-04-06 | End: 2019-04-11

## 2019-04-06 RX ORDER — ALBUTEROL SULFATE 2.5 MG/3ML
2.5 SOLUTION RESPIRATORY (INHALATION) EVERY 4 HOURS PRN
Qty: 120 EACH | Refills: 0 | Status: SHIPPED | OUTPATIENT
Start: 2019-04-06 | End: 2019-09-11 | Stop reason: ALTCHOICE

## 2019-04-06 RX ADMIN — IPRATROPIUM BROMIDE AND ALBUTEROL SULFATE 1 AMPULE: .5; 3 SOLUTION RESPIRATORY (INHALATION) at 10:57

## 2019-04-06 RX ADMIN — GUAIFENESIN AND DEXTROMETHORPHAN 5 ML: 100; 10 SYRUP ORAL at 09:40

## 2019-04-06 RX ADMIN — PREDNISONE 60 MG: 20 TABLET ORAL at 09:40

## 2019-04-06 ASSESSMENT — PAIN DESCRIPTION - PROGRESSION: CLINICAL_PROGRESSION: GRADUALLY WORSENING

## 2019-04-06 ASSESSMENT — ENCOUNTER SYMPTOMS
WHEEZING: 1
RHINORRHEA: 0
ABDOMINAL PAIN: 0
SHORTNESS OF BREATH: 0
SORE THROAT: 0
VOMITING: 0
COUGH: 1
NAUSEA: 0
EYE REDNESS: 0
BACK PAIN: 0

## 2019-04-06 ASSESSMENT — PAIN DESCRIPTION - PAIN TYPE: TYPE: ACUTE PAIN

## 2019-04-06 ASSESSMENT — PAIN DESCRIPTION - LOCATION: LOCATION: ELBOW;HEAD

## 2019-04-06 ASSESSMENT — PAIN DESCRIPTION - ONSET: ONSET: ON-GOING

## 2019-04-06 ASSESSMENT — PAIN DESCRIPTION - DESCRIPTORS: DESCRIPTORS: ACHING;NUMBNESS

## 2019-04-06 ASSESSMENT — PAIN DESCRIPTION - ORIENTATION: ORIENTATION: LEFT

## 2019-04-06 ASSESSMENT — PAIN DESCRIPTION - FREQUENCY: FREQUENCY: CONTINUOUS

## 2019-04-06 NOTE — ED PROVIDER NOTES
Triage Chief Complaint:   Headache; Cough; and Elbow Pain    [place]:  Vu Ayala is a 37 y.o. female that presents with feeling unwell for the last 6 days. She has had a cough and headache with this. Multiple other people have been sick at home. She states the headache is different from her typical migraines and she has a sharp pain in the occipital region of her head in the center. That pain is increased with coughing. The cough is productive of yellow sputum. She feels bubble in the back of her sinuses and has a difficult time coughing that now. Denies shortness of breath. She has been taking NyQuil and Mucinex with only minimal improvement in her symptoms. She did have a fever earlier in the week but it is now resolved and she has not had a fever today. She did have a couple episodes of posttussive emesis. No nausea or emesis at other times. She denies any abdominal pain. No sharp chest pain but did have a flutter in her chest earlier in the week. Last night she woke up with pain in her left elbow. It is on the medial side of the elbow and it hurts to move. She felt some numbness and tingling in her left hand more in the thumb and first finger. She denies any trauma to her elbow. She states she sleeps on her back and on her side. No neck pain. ROS:   Review of Systems   Constitutional: Positive for fever (resolved). Negative for chills. HENT: Negative for congestion, rhinorrhea and sore throat. Eyes: Negative for redness and visual disturbance. Respiratory: Positive for cough and wheezing. Negative for shortness of breath. Cardiovascular: Negative for chest pain and leg swelling. Gastrointestinal: Negative for abdominal pain, nausea and vomiting. Genitourinary: Negative for dysuria and frequency. Musculoskeletal: Positive for arthralgias (L elbow pain). Negative for back pain, joint swelling, neck pain and neck stiffness. Skin: Negative for rash and wound. Social History Narrative    Not on file     No current facility-administered medications for this encounter. Current Outpatient Medications   Medication Sig Dispense Refill    LORazepam (ATIVAN) 0.5 MG tablet Take 0.5 mg by mouth every 6 hours as needed for Anxiety.  QUEtiapine (SEROQUEL) 50 MG tablet Take 50 mg by mouth 2 times daily      vitamin D (CHOLECALCIFEROL) 1000 UNIT TABS tablet Take 1,000 Units by mouth once a week      polycarbophil (FIBERCON) 625 MG tablet Take 625 mg by mouth daily      butalbital-aspirin-caffeine (FIORINAL) -40 MG capsule Take by mouth every 4 hours as needed for Headaches.       prochlorperazine (COMPAZINE) 5 MG tablet Take 5 mg by mouth every 6 hours as needed for Nausea      diphenhydrAMINE (BENADRYL) 25 MG capsule Take 25 mg by mouth every 6 hours as needed for Itching      albuterol (PROVENTIL) (2.5 MG/3ML) 0.083% nebulizer solution Take 3 mLs by nebulization every 4 hours as needed for Wheezing 120 each 0    predniSONE (DELTASONE) 10 MG tablet Take 6 tablets by mouth daily for 5 days 30 tablet 0    albuterol sulfate HFA (PROVENTIL HFA) 108 (90 BASE) MCG/ACT inhaler Inhale 2 puffs into the lungs every 6 hours as needed for Wheezing or Shortness of Breath 1 Inhaler 0    ondansetron (ZOFRAN ODT) 4 MG disintegrating tablet Take 1 tablet by mouth every 8 hours as needed for Nausea 15 tablet 0    naproxen (NAPROSYN) 500 MG tablet Take 1 tablet by mouth 2 times daily 60 tablet 0     Allergies   Allergen Reactions    Morphine And Related Hives    Ibuprofen Hives    Motrin [Ibuprofen Micronized] Hives       Nursing Notes Reviewed     Physical Exam:   ED Triage Vitals [04/06/19 0817]   Enc Vitals Group      BP (!) 115/90      Pulse 95      Resp 18      Temp 98.4 °F (36.9 °C)      Temp Source Oral      SpO2 99 %      Weight 103 lb 8 oz (46.9 kg)      Height 5' 6\" (1.676 m)      Head Circumference       Peak Flow       Pain Score       Pain Loc       Pain Edu?       Excl. in [address]? /82   Pulse 92   Temp 98.4 °F (36.9 °C) (Oral)   Resp 17   Ht 5' 6\" (1.676 m)   Wt 103 lb 8 oz (46.9 kg)   LMP 03/30/2019   SpO2 96%   BMI 16.71 kg/m²   My pulse ox interpretation is - normal  Physical Exam   Constitutional: She appears well-developed and well-nourished. No distress. HENT:   Head: Normocephalic and atraumatic. Eyes: Conjunctivae are normal. Right eye exhibits no discharge. Left eye exhibits no discharge. Cardiovascular: Normal rate, regular rhythm and intact distal pulses. Pulses:       Radial pulses are 2+ on the right side, and 2+ on the left side. Pulmonary/Chest: Effort normal. No respiratory distress. She has wheezes (bilateral expiratory wheezes). Abdominal: Soft. Bowel sounds are normal. She exhibits no distension. There is no tenderness. There is no rebound and no guarding. Musculoskeletal: Normal range of motion. She exhibits no deformity. Left elbow: She exhibits normal range of motion (reports decreased ROM, but full movement on exam), no swelling and no effusion. Tenderness found. Medial epicondyle tenderness noted. No lateral epicondyle and no olecranon process tenderness noted. Left hand: She exhibits normal range of motion. Normal strength noted. Hands:  Neurological: She is alert. No cranial nerve deficit. Skin: Skin is warm and dry. Capillary refill takes less than 2 seconds. She is not diaphoretic. Psychiatric: She has a normal mood and affect. Her behavior is normal.       I have reviewed and interpreted all of the currently available lab results from this visit (if applicable):  No results found for this visit on 04/06/19. Radiographs (if obtained):  [] The following radiograph was interpreted by myself in the absence of a radiologist:  [x]Radiologist's Report Reviewed:  XR CHEST PORTABLE   Final Result   No acute cardiopulmonary process.          XR ELBOW LEFT (MIN 3 VIEWS)   Final Result   No acute findings nor significant degenerative changes in the left elbow. EKG (if obtained): (All EKG's are interpreted by myself in the absence of a cardiologist)    MDM:  Differential diagnoses considered include bronchitis, pneumonia, viral URI, COPD exacerbation, fracture, dislocation, tendinitis. Chest x-ray shows no acute cardiopulmonary abnormalities. No pneumothorax and no pneumonia. Patient was given a DuoNeb and prednisone after which her wheezing is resolved. She is maintain normal oxygen saturation throughout her stay in the emergency department. I suspect she has a viral bronchitis as her symptoms have been going on for many days. She is out of the window for TM of flow. We'll treat her with bronchodilators and a steroid burst.  X-ray of her elbow shows no acute findings. She is only tender over the medial side. I suspect her symptoms are due to tendinitis. I recommended rest, ice and elevation. She is also given exercises. She is seeing a physical therapist for right shoulder already at this point and I encouraged her to discuss her left elbow pain when she goes to her next appointment. I also recommended close follow-up with her primary care physician. We'll discharge her home in stable condition. Plan of care explained to patient. Concerning signs and symptoms warranting a return visit to the Emergency Department were explained in detail. All questions and concerns were addressed to the patient's satisfaction. Patient understood and agreed with plan. The likelihood of other entities in the differential is insufficient to justify any further testing for them. This was explained to the patient. The patient was advised that persistent or worsening symptoms would requirefurther evaluation. Clinical Impression:  1. Bronchitis    2.  Left elbow pain          Riley Jones MD       Please note that portions of this note may have been complete with a voice recognition program.  Rande Areas made to edit the dictations, but occasional words are mis-transcribed.           Phyllis Dwyer MD  04/06/19 1120

## 2019-04-06 NOTE — ED NOTES
Discharge instructions reviewed. Pt verbalized understanding.        Evelyn Grijalva RN  04/06/19 1882

## 2019-07-04 ENCOUNTER — HOSPITAL ENCOUNTER (EMERGENCY)
Age: 44
Discharge: HOME | End: 2019-07-04
Payer: COMMERCIAL

## 2019-07-04 VITALS
OXYGEN SATURATION: 100 % | HEART RATE: 76 BPM | RESPIRATION RATE: 14 BRPM | WEIGHT: 90 LBS | SYSTOLIC BLOOD PRESSURE: 132 MMHG | BODY MASS INDEX: 14.46 KG/M2 | DIASTOLIC BLOOD PRESSURE: 107 MMHG | TEMPERATURE: 98.8 F | HEIGHT: 66 IN

## 2019-07-04 DIAGNOSIS — R03.0 ELEVATED BLOOD PRESSURE READING WITHOUT DIAGNOSIS OF HYPERTENSION: ICD-10-CM

## 2019-07-04 DIAGNOSIS — J06.9 UPPER RESPIRATORY INFECTION WITH COUGH AND CONGESTION: Primary | ICD-10-CM

## 2019-07-04 DIAGNOSIS — H65.91 RIGHT OTITIS MEDIA WITH EFFUSION: ICD-10-CM

## 2019-07-04 PROCEDURE — 99283 EMERGENCY DEPT VISIT LOW MDM: CPT

## 2019-07-04 RX ORDER — MOMETASONE FUROATE 50 UG/1
2 SPRAY, METERED NASAL DAILY
Qty: 1 INHALER | Refills: 3 | Status: SHIPPED | OUTPATIENT
Start: 2019-07-04 | End: 2020-01-21 | Stop reason: CLARIF

## 2019-07-04 NOTE — ED PROVIDER NOTES
6.5 % otic solution Place 5 drops into the right ear 2 times daily 1 Bottle 0    LORazepam (ATIVAN) 0.5 MG tablet Take 0.5 mg by mouth every 6 hours as needed for Anxiety.  QUEtiapine (SEROQUEL) 50 MG tablet Take 50 mg by mouth 2 times daily      vitamin D (CHOLECALCIFEROL) 1000 UNIT TABS tablet Take 1,000 Units by mouth once a week      polycarbophil (FIBERCON) 625 MG tablet Take 625 mg by mouth daily      butalbital-aspirin-caffeine (FIORINAL) -40 MG capsule Take by mouth every 4 hours as needed for Headaches.  prochlorperazine (COMPAZINE) 5 MG tablet Take 5 mg by mouth every 6 hours as needed for Nausea      diphenhydrAMINE (BENADRYL) 25 MG capsule Take 25 mg by mouth every 6 hours as needed for Itching      albuterol (PROVENTIL) (2.5 MG/3ML) 0.083% nebulizer solution Take 3 mLs by nebulization every 4 hours as needed for Wheezing 120 each 0    naproxen (NAPROSYN) 500 MG tablet Take 1 tablet by mouth 2 times daily 60 tablet 0    albuterol sulfate HFA (PROVENTIL HFA) 108 (90 BASE) MCG/ACT inhaler Inhale 2 puffs into the lungs every 6 hours as needed for Wheezing or Shortness of Breath 1 Inhaler 0    ondansetron (ZOFRAN ODT) 4 MG disintegrating tablet Take 1 tablet by mouth every 8 hours as needed for Nausea 15 tablet 0     ALLERGIES    Allergies   Allergen Reactions    Morphine And Related Hives    Ibuprofen Hives    Motrin [Ibuprofen Micronized] Hives     FAMILY AND SOCIAL HISTORY    No family history on file.   Social History     Socioeconomic History    Marital status:      Spouse name: Not on file    Number of children: Not on file    Years of education: Not on file    Highest education level: Not on file   Occupational History    Not on file   Social Needs    Financial resource strain: Not on file    Food insecurity:     Worry: Not on file     Inability: Not on file    Transportation needs:     Medical: Not on file     Non-medical: Not on file   Tobacco Use    Smoking status: Current Every Day Smoker     Packs/day: 1.00     Years: 20.00     Pack years: 20.00     Types: Cigarettes    Smokeless tobacco: Never Used   Substance and Sexual Activity    Alcohol use: Yes     Comment: occassionally    Drug use: Yes     Types: Marijuana    Sexual activity: Never   Lifestyle    Physical activity:     Days per week: Not on file     Minutes per session: Not on file    Stress: Not on file   Relationships    Social connections:     Talks on phone: Not on file     Gets together: Not on file     Attends Sabianism service: Not on file     Active member of club or organization: Not on file     Attends meetings of clubs or organizations: Not on file     Relationship status: Not on file    Intimate partner violence:     Fear of current or ex partner: Not on file     Emotionally abused: Not on file     Physically abused: Not on file     Forced sexual activity: Not on file   Other Topics Concern    Not on file   Social History Narrative    Not on file       PHYSICAL EXAM    VITAL SIGNS: BP (!) 132/107   Pulse 76   Temp 98.8 °F (37.1 °C) (Oral)   Resp 14   Ht 5' 6\" (1.676 m)   Wt 90 lb (40.8 kg)   SpO2 100%   BMI 14.53 kg/m²   Constitutional:  Well developed, well nourished, no acute distress  HEENT:     - Normocephalic, Atraumatic   - PERRL, EOM intact. Conjunctiva normal    -  Frontal/Maxillary sinuses NONtender to percussion.   - Nasal passages mildly boggy nasal turbinates. +clear rhinnorhea. No massess.   - Oropharynx noninjected without tonsillar hypertrophy or exudate. No trismus   - No swollen lymph nodes. Tolerating oral secretions. Uvula midline. No inspiratory stridor. No hot potato voice    Ears:  - External ears normal appearing without redness or edema.   - No mastoid tenderness bilaterally. - No tenderness to palpation of the pinna or tragus bilaterally. - Left EAC is clear without erythema or edema.   Left TM is pearly grey and translucent without discharge, dizziness or lightheadedness. HEENT exam was remarkable for mild right sided ear cerumen without impaction. Able to visualize TM which is intact, opaque with a clear air-fluid level/effusion. Posterior face is patent, telling her secretions. Lungs are clear to auscultation. At this time, given length of symptoms, suspect likely viral URI with component of acute serous otitis media, likely secondary to eustachian tube dysfunction. No evidence of a bacterial process requiring antibiotics we discussed symptomatic care and outpatient follow-up with PCP if symptoms persist as well as her blood pressure management. Patient is comfortable and agreeable with this plan. Low clinical suspicion for airway obstruction, peritonsillar abscess/retropharyngeal abscess, strep pharyngitis, epiglottitis, mastoiditis, auricular cellulitis, malignant otitis externa, bacterial otitis media/externa. Patient is discharged stable condition with oral antitussives/nasal decongestion as well as Nasonex sprays. Educated on rest, warm salt water gargles, Tylenol/ibuprofen as needed. Should avoid Q-tip use and is given a course of Debrox for noted earwax. Encourage smoking cessation. Educated auto insufflation. We will follow-up with PCP in the next 2 to 3 days for recheck. Patient is noted to have elevated blood pressure in the ED without known/diagnosed history of hypertension. H Currently denying any chest pain, shortness of breath, headache, dizziness or other signs or symptoms concerning for hypertensive emergency/crisis requiring rapid blood pressure lowering at this time. Had a lengthy discussion with patient regarding blood pressure management and that poorly controlled hypertension can lead to worsening medical conditions and even death including ACS/MI, dissection, stroke, end organ injury, etc.  Strongly encouraged to follow up with PCP and/or cardiologist for blood pressure rechecks and antihypertensive medications. Diagnosis and plan discussed in detail with patient who understands and agrees. Patient agrees to return emergency department if symptoms worsen or any new symptoms develop. Comment: Please note this report has been produced using speech recognition software and may contain errors related to that system including errors in grammar, punctuation, and spelling, as well as words and phrases that may be inappropriate. If there are any questions or concerns please feel free to contact the dictating provider for clarification.             Eliecer Arreguin PA-C  07/04/19 9579

## 2019-08-11 ENCOUNTER — APPOINTMENT (OUTPATIENT)
Dept: ULTRASOUND IMAGING | Age: 44
End: 2019-08-11
Payer: COMMERCIAL

## 2019-08-11 ENCOUNTER — APPOINTMENT (OUTPATIENT)
Dept: CT IMAGING | Age: 44
End: 2019-08-11
Payer: COMMERCIAL

## 2019-08-11 ENCOUNTER — HOSPITAL ENCOUNTER (EMERGENCY)
Age: 44
Discharge: HOME | End: 2019-08-11
Attending: EMERGENCY MEDICINE
Payer: COMMERCIAL

## 2019-08-11 VITALS
SYSTOLIC BLOOD PRESSURE: 159 MMHG | RESPIRATION RATE: 15 BRPM | OXYGEN SATURATION: 100 % | TEMPERATURE: 98.3 F | WEIGHT: 108 LBS | HEIGHT: 66 IN | BODY MASS INDEX: 17.36 KG/M2 | HEART RATE: 48 BPM | DIASTOLIC BLOOD PRESSURE: 88 MMHG

## 2019-08-11 DIAGNOSIS — R10.9 ABDOMINAL PAIN, UNSPECIFIED ABDOMINAL LOCATION: ICD-10-CM

## 2019-08-11 DIAGNOSIS — R10.9 LEFT FLANK PAIN: Primary | ICD-10-CM

## 2019-08-11 DIAGNOSIS — N83.209 CYST OF OVARY, UNSPECIFIED LATERALITY: ICD-10-CM

## 2019-08-11 DIAGNOSIS — R30.0 DYSURIA: ICD-10-CM

## 2019-08-11 LAB
ALBUMIN SERPL-MCNC: 3.8 GM/DL (ref 3.4–5)
ALP BLD-CCNC: 83 IU/L (ref 40–129)
ALT SERPL-CCNC: 10 U/L (ref 10–40)
ANION GAP SERPL CALCULATED.3IONS-SCNC: 9 MMOL/L (ref 4–16)
AST SERPL-CCNC: 21 IU/L (ref 15–37)
BACTERIA: NEGATIVE /HPF
BASOPHILS ABSOLUTE: 0 K/CU MM
BASOPHILS RELATIVE PERCENT: 0.8 % (ref 0–1)
BILIRUB SERPL-MCNC: 0.4 MG/DL (ref 0–1)
BILIRUBIN URINE: NEGATIVE MG/DL
BLOOD, URINE: NEGATIVE
BUN BLDV-MCNC: 9 MG/DL (ref 6–23)
CALCIUM SERPL-MCNC: 8.8 MG/DL (ref 8.3–10.6)
CHLORIDE BLD-SCNC: 106 MMOL/L (ref 99–110)
CLARITY: CLEAR
CO2: 23 MMOL/L (ref 21–32)
COLOR: YELLOW
CREAT SERPL-MCNC: 0.9 MG/DL (ref 0.6–1.1)
DIFFERENTIAL TYPE: ABNORMAL
EOSINOPHILS ABSOLUTE: 0.1 K/CU MM
EOSINOPHILS RELATIVE PERCENT: 1.6 % (ref 0–3)
GFR AFRICAN AMERICAN: >60 ML/MIN/1.73M2
GFR NON-AFRICAN AMERICAN: >60 ML/MIN/1.73M2
GLUCOSE BLD-MCNC: 112 MG/DL (ref 70–99)
GLUCOSE, URINE: NEGATIVE MG/DL
GONADOTROPIN, CHORIONIC (HCG) QUANT: NORMAL UIU/ML
HCT VFR BLD CALC: 42.1 % (ref 37–47)
HEMOGLOBIN: 13.4 GM/DL (ref 12.5–16)
IMMATURE NEUTROPHIL %: 0.3 % (ref 0–0.43)
KETONES, URINE: NEGATIVE MG/DL
LEUKOCYTE ESTERASE, URINE: NEGATIVE
LIPASE: 49 IU/L (ref 13–60)
LYMPHOCYTES ABSOLUTE: 1.3 K/CU MM
LYMPHOCYTES RELATIVE PERCENT: 34.6 % (ref 24–44)
MCH RBC QN AUTO: 32.6 PG (ref 27–31)
MCHC RBC AUTO-ENTMCNC: 31.8 % (ref 32–36)
MCV RBC AUTO: 102.4 FL (ref 78–100)
MONOCYTES ABSOLUTE: 0.5 K/CU MM
MONOCYTES RELATIVE PERCENT: 12.8 % (ref 0–4)
MUCUS: ABNORMAL HPF
NITRITE URINE, QUANTITATIVE: NEGATIVE
NUCLEATED RBC %: 0 %
PDW BLD-RTO: 13.1 % (ref 11.7–14.9)
PH, URINE: 6 (ref 5–8)
PLATELET # BLD: 216 K/CU MM (ref 140–440)
PMV BLD AUTO: 9.5 FL (ref 7.5–11.1)
POTASSIUM SERPL-SCNC: 4.1 MMOL/L (ref 3.5–5.1)
PROTEIN UA: NEGATIVE MG/DL
RBC # BLD: 4.11 M/CU MM (ref 4.2–5.4)
RBC URINE: <1 /HPF (ref 0–6)
SEGMENTED NEUTROPHILS ABSOLUTE COUNT: 1.8 K/CU MM
SEGMENTED NEUTROPHILS RELATIVE PERCENT: 49.9 % (ref 36–66)
SODIUM BLD-SCNC: 138 MMOL/L (ref 135–145)
SPECIFIC GRAVITY UA: 1.01 (ref 1–1.03)
SQUAMOUS EPITHELIAL: 6 /HPF
TOTAL IMMATURE NEUTOROPHIL: 0.01 K/CU MM
TOTAL NUCLEATED RBC: 0 K/CU MM
TOTAL PROTEIN: 6.6 GM/DL (ref 6.4–8.2)
TRICHOMONAS: ABNORMAL /HPF
UROBILINOGEN, URINE: 1 MG/DL (ref 0.2–1)
WBC # BLD: 3.7 K/CU MM (ref 4–10.5)
WBC UA: <1 /HPF (ref 0–5)

## 2019-08-11 PROCEDURE — 81001 URINALYSIS AUTO W/SCOPE: CPT

## 2019-08-11 PROCEDURE — 72131 CT LUMBAR SPINE W/O DYE: CPT

## 2019-08-11 PROCEDURE — 83690 ASSAY OF LIPASE: CPT

## 2019-08-11 PROCEDURE — 85025 COMPLETE CBC W/AUTO DIFF WBC: CPT

## 2019-08-11 PROCEDURE — 84702 CHORIONIC GONADOTROPIN TEST: CPT

## 2019-08-11 PROCEDURE — 6370000000 HC RX 637 (ALT 250 FOR IP): Performed by: EMERGENCY MEDICINE

## 2019-08-11 PROCEDURE — 6360000002 HC RX W HCPCS: Performed by: EMERGENCY MEDICINE

## 2019-08-11 PROCEDURE — 87086 URINE CULTURE/COLONY COUNT: CPT

## 2019-08-11 PROCEDURE — 76856 US EXAM PELVIC COMPLETE: CPT

## 2019-08-11 PROCEDURE — 36415 COLL VENOUS BLD VENIPUNCTURE: CPT

## 2019-08-11 PROCEDURE — 99284 EMERGENCY DEPT VISIT MOD MDM: CPT

## 2019-08-11 PROCEDURE — 93975 VASCULAR STUDY: CPT

## 2019-08-11 PROCEDURE — 76830 TRANSVAGINAL US NON-OB: CPT

## 2019-08-11 PROCEDURE — 74176 CT ABD & PELVIS W/O CONTRAST: CPT

## 2019-08-11 PROCEDURE — 96372 THER/PROPH/DIAG INJ SC/IM: CPT

## 2019-08-11 PROCEDURE — 80053 COMPREHEN METABOLIC PANEL: CPT

## 2019-08-11 RX ORDER — ONDANSETRON 4 MG/1
4 TABLET, ORALLY DISINTEGRATING ORAL ONCE
Status: COMPLETED | OUTPATIENT
Start: 2019-08-11 | End: 2019-08-11

## 2019-08-11 RX ORDER — LIDOCAINE 50 MG/G
1 PATCH TOPICAL DAILY
Qty: 30 PATCH | Refills: 0 | Status: SHIPPED | OUTPATIENT
Start: 2019-08-11 | End: 2020-01-21

## 2019-08-11 RX ORDER — FENTANYL CITRATE 50 UG/ML
50 INJECTION, SOLUTION INTRAMUSCULAR; INTRAVENOUS
Status: DISCONTINUED | OUTPATIENT
Start: 2019-08-11 | End: 2019-08-11 | Stop reason: HOSPADM

## 2019-08-11 RX ORDER — LIDOCAINE 4 G/G
1 PATCH TOPICAL DAILY
Status: DISCONTINUED | OUTPATIENT
Start: 2019-08-11 | End: 2019-08-11 | Stop reason: HOSPADM

## 2019-08-11 RX ORDER — ACETAMINOPHEN 500 MG
1000 TABLET ORAL ONCE
Status: COMPLETED | OUTPATIENT
Start: 2019-08-11 | End: 2019-08-11

## 2019-08-11 RX ORDER — ACETAMINOPHEN 325 MG/1
650 TABLET ORAL EVERY 6 HOURS PRN
Qty: 120 TABLET | Refills: 3 | Status: SHIPPED | OUTPATIENT
Start: 2019-08-11 | End: 2021-12-20

## 2019-08-11 RX ORDER — FENTANYL CITRATE 50 UG/ML
50 INJECTION, SOLUTION INTRAMUSCULAR; INTRAVENOUS
Status: DISCONTINUED | OUTPATIENT
Start: 2019-08-11 | End: 2019-08-11

## 2019-08-11 RX ADMIN — FENTANYL CITRATE 50 MCG: 50 INJECTION INTRAMUSCULAR; INTRAVENOUS at 13:45

## 2019-08-11 RX ADMIN — ACETAMINOPHEN 1000 MG: 500 TABLET ORAL at 10:35

## 2019-08-11 RX ADMIN — ONDANSETRON 4 MG: 4 TABLET, ORALLY DISINTEGRATING ORAL at 10:35

## 2019-08-11 ASSESSMENT — PAIN SCALES - GENERAL
PAINLEVEL_OUTOF10: 4
PAINLEVEL_OUTOF10: 10

## 2019-08-11 ASSESSMENT — PAIN DESCRIPTION - PAIN TYPE
TYPE: ACUTE PAIN

## 2019-08-11 ASSESSMENT — ENCOUNTER SYMPTOMS
EYES NEGATIVE: 1
RESPIRATORY NEGATIVE: 1
BACK PAIN: 1
ALLERGIC/IMMUNOLOGIC NEGATIVE: 1
ABDOMINAL PAIN: 1

## 2019-08-11 ASSESSMENT — PAIN DESCRIPTION - LOCATION
LOCATION: BACK
LOCATION: ABDOMEN
LOCATION: BACK

## 2019-08-11 ASSESSMENT — PAIN DESCRIPTION - PROGRESSION
CLINICAL_PROGRESSION: GRADUALLY IMPROVING
CLINICAL_PROGRESSION: NOT CHANGED

## 2019-08-11 ASSESSMENT — PAIN DESCRIPTION - ORIENTATION
ORIENTATION: LEFT
ORIENTATION: LEFT;LOWER
ORIENTATION: LEFT

## 2019-08-11 ASSESSMENT — PAIN DESCRIPTION - DESCRIPTORS: DESCRIPTORS: DULL;THROBBING

## 2019-08-11 ASSESSMENT — PAIN DESCRIPTION - FREQUENCY: FREQUENCY: CONTINUOUS

## 2019-08-11 NOTE — ED PROVIDER NOTES
Occupational History    Not on file   Social Needs    Financial resource strain: Not on file    Food insecurity:     Worry: Not on file     Inability: Not on file    Transportation needs:     Medical: Not on file     Non-medical: Not on file   Tobacco Use    Smoking status: Current Every Day Smoker     Packs/day: 1.00     Years: 20.00     Pack years: 20.00     Types: Cigarettes    Smokeless tobacco: Never Used   Substance and Sexual Activity    Alcohol use: Yes     Comment: occassionally    Drug use: Yes     Types: Marijuana    Sexual activity: Never   Lifestyle    Physical activity:     Days per week: Not on file     Minutes per session: Not on file    Stress: Not on file   Relationships    Social connections:     Talks on phone: Not on file     Gets together: Not on file     Attends Evangelical service: Not on file     Active member of club or organization: Not on file     Attends meetings of clubs or organizations: Not on file     Relationship status: Not on file    Intimate partner violence:     Fear of current or ex partner: Not on file     Emotionally abused: Not on file     Physically abused: Not on file     Forced sexual activity: Not on file   Other Topics Concern    Not on file   Social History Narrative    Not on file     Current Facility-Administered Medications   Medication Dose Route Frequency Provider Last Rate Last Dose    lidocaine 4 % external patch 1 patch  1 patch Transdermal Daily Jeanell Cea, DO   1 patch at 08/11/19 1035    fentaNYL (SUBLIMAZE) injection 50 mcg  50 mcg Intramuscular Q1H PRN Jeanell Cea, DO   50 mcg at 08/11/19 1345     Current Outpatient Medications   Medication Sig Dispense Refill    acetaminophen (TYLENOL) 325 MG tablet Take 2 tablets by mouth every 6 hours as needed for Pain 120 tablet 3    lidocaine (LIDODERM) 5 % Place 1 patch onto the skin daily 12 hours on, 12 hours off.  30 patch 0    Pseudoephedrine-DM-GG 60- MG TABS Take 1 tablet by mouth every 6 hours 20 tablet 0    mometasone (NASONEX) 50 MCG/ACT nasal spray 2 sprays by Each Nostril route daily 1 Inhaler 3    LORazepam (ATIVAN) 0.5 MG tablet Take 0.5 mg by mouth every 6 hours as needed for Anxiety.  QUEtiapine (SEROQUEL) 50 MG tablet Take 50 mg by mouth 2 times daily      vitamin D (CHOLECALCIFEROL) 1000 UNIT TABS tablet Take 1,000 Units by mouth once a week      polycarbophil (FIBERCON) 625 MG tablet Take 625 mg by mouth daily      butalbital-aspirin-caffeine (FIORINAL) -40 MG capsule Take by mouth every 4 hours as needed for Headaches.       prochlorperazine (COMPAZINE) 5 MG tablet Take 5 mg by mouth every 6 hours as needed for Nausea      diphenhydrAMINE (BENADRYL) 25 MG capsule Take 25 mg by mouth every 6 hours as needed for Itching      albuterol (PROVENTIL) (2.5 MG/3ML) 0.083% nebulizer solution Take 3 mLs by nebulization every 4 hours as needed for Wheezing 120 each 0    naproxen (NAPROSYN) 500 MG tablet Take 1 tablet by mouth 2 times daily 60 tablet 0    albuterol sulfate HFA (PROVENTIL HFA) 108 (90 BASE) MCG/ACT inhaler Inhale 2 puffs into the lungs every 6 hours as needed for Wheezing or Shortness of Breath 1 Inhaler 0    ondansetron (ZOFRAN ODT) 4 MG disintegrating tablet Take 1 tablet by mouth every 8 hours as needed for Nausea 15 tablet 0      Allergies   Allergen Reactions    Morphine And Related Hives    Ibuprofen Hives    Motrin [Ibuprofen Micronized] Hives     Current Facility-Administered Medications   Medication Dose Route Frequency Provider Last Rate Last Dose    lidocaine 4 % external patch 1 patch  1 patch Transdermal Daily Joey Fuentes DO   1 patch at 08/11/19 1035    fentaNYL (SUBLIMAZE) injection 50 mcg  50 mcg Intramuscular Q1H PRN Carolina Abebe DO   50 mcg at 08/11/19 1345     Current Outpatient Medications   Medication Sig Dispense Refill    acetaminophen (TYLENOL) 325 MG tablet Take 2 tablets by mouth every 6 hours as needed for pregnant labs otherwise negative. Patient feels better repeat examination given OB follow-up return precautions stable. Give medication. No signs of urine infection kidney stone diverticulitis etc.  No signs of torsion. CLINICAL IMPRESSION:  Final diagnoses:   Left flank pain   Abdominal pain, unspecified abdominal location   Dysuria   Cyst of ovary, unspecified laterality       (Please note that portions of this note may have been completed with a voice recognition program. Efforts were made to edit the dictations but occasionally words aremis-transcribed.)    DISPOSITION REFERRAL (if applicable):  Hannah Angel MD  [address] [place] [address]  [phone]    Schedule an appointment as soon as possible for a visit in 1 day      [address] [place] [address] Emergency Department  [place] [id number] [phone]    If symptoms worsen    Cher Zavala MD  [address] [place] [id number]  [phone]    Schedule an appointment as soon as possible for a visit in 1 day  OB/GYN      DISPOSITION MEDICATIONS (if applicable):  Discharge Medication List as of 8/11/2019  3:42 PM      START taking these medications    Details   acetaminophen (TYLENOL) 325 MG tablet Take 2 tablets by mouth every 6 hours as needed for Pain, Disp-120 tablet, R-[id number]      lidocaine (LIDODERM) 5 % Place 1 patch onto the skin daily 12 hours on, 12 hours off., Disp-30 patch, R-[id number]                DO Shaye Brooks DO  08/11/19 4984

## 2019-08-11 NOTE — ED NOTES
Discharge instructions given, pt voiced understanding. Pt denies further needs at this time. Pt ambulated out of ED with gait steady, ride at side.       Alisa Richards, RN  08/11/19 8191

## 2019-08-12 LAB
CULTURE: NORMAL
Lab: NORMAL
SPECIMEN: NORMAL

## 2019-09-03 ENCOUNTER — HOSPITAL ENCOUNTER (OUTPATIENT)
Dept: GENERAL RADIOLOGY | Age: 44
Discharge: HOME | End: 2019-09-03
Payer: COMMERCIAL

## 2019-09-03 ENCOUNTER — HOSPITAL ENCOUNTER (OUTPATIENT)
Age: 44
Discharge: HOME | End: 2019-09-03
Payer: COMMERCIAL

## 2019-09-03 DIAGNOSIS — M54.40 LOW BACK PAIN WITH SCIATICA, SCIATICA LATERALITY UNSPECIFIED, UNSPECIFIED BACK PAIN LATERALITY, UNSPECIFIED CHRONICITY: ICD-10-CM

## 2019-09-03 DIAGNOSIS — M54.6 THORACIC BACK PAIN, UNSPECIFIED BACK PAIN LATERALITY, UNSPECIFIED CHRONICITY: ICD-10-CM

## 2019-09-03 DIAGNOSIS — M54.2 NECK PAIN: ICD-10-CM

## 2019-09-03 PROCEDURE — 72100 X-RAY EXAM L-S SPINE 2/3 VWS: CPT

## 2019-09-03 PROCEDURE — 72050 X-RAY EXAM NECK SPINE 4/5VWS: CPT

## 2019-09-03 PROCEDURE — 72072 X-RAY EXAM THORAC SPINE 3VWS: CPT

## 2019-09-11 ENCOUNTER — APPOINTMENT (OUTPATIENT)
Dept: GENERAL RADIOLOGY | Age: 44
End: 2019-09-11
Payer: COMMERCIAL

## 2019-09-11 ENCOUNTER — HOSPITAL ENCOUNTER (EMERGENCY)
Age: 44
Discharge: HOME | End: 2019-09-11
Attending: EMERGENCY MEDICINE
Payer: COMMERCIAL

## 2019-09-11 VITALS
OXYGEN SATURATION: 100 % | SYSTOLIC BLOOD PRESSURE: 124 MMHG | TEMPERATURE: 99.1 F | WEIGHT: 105 LBS | RESPIRATION RATE: 17 BRPM | HEIGHT: 66 IN | BODY MASS INDEX: 16.88 KG/M2 | HEART RATE: 93 BPM | DIASTOLIC BLOOD PRESSURE: 87 MMHG

## 2019-09-11 DIAGNOSIS — J06.9 UPPER RESPIRATORY TRACT INFECTION, UNSPECIFIED TYPE: Primary | ICD-10-CM

## 2019-09-11 PROCEDURE — 6370000000 HC RX 637 (ALT 250 FOR IP): Performed by: EMERGENCY MEDICINE

## 2019-09-11 PROCEDURE — 94761 N-INVAS EAR/PLS OXIMETRY MLT: CPT

## 2019-09-11 PROCEDURE — 94640 AIRWAY INHALATION TREATMENT: CPT

## 2019-09-11 PROCEDURE — 71046 X-RAY EXAM CHEST 2 VIEWS: CPT

## 2019-09-11 PROCEDURE — 99283 EMERGENCY DEPT VISIT LOW MDM: CPT

## 2019-09-11 PROCEDURE — 93005 ELECTROCARDIOGRAM TRACING: CPT | Performed by: EMERGENCY MEDICINE

## 2019-09-11 RX ORDER — IPRATROPIUM BROMIDE AND ALBUTEROL SULFATE 2.5; .5 MG/3ML; MG/3ML
1 SOLUTION RESPIRATORY (INHALATION) ONCE
Status: COMPLETED | OUTPATIENT
Start: 2019-09-11 | End: 2019-09-11

## 2019-09-11 RX ORDER — ALBUTEROL SULFATE 90 UG/1
2 AEROSOL, METERED RESPIRATORY (INHALATION) EVERY 4 HOURS PRN
Qty: 1 INHALER | Refills: 1 | Status: SHIPPED | OUTPATIENT
Start: 2019-09-11 | End: 2021-03-05

## 2019-09-11 RX ORDER — PREDNISONE 10 MG/1
40 TABLET ORAL DAILY
Qty: 25 TABLET | Refills: 0 | Status: SHIPPED | OUTPATIENT
Start: 2019-09-11 | End: 2019-09-17

## 2019-09-11 RX ORDER — GUAIFENESIN AND CODEINE PHOSPHATE 100; 10 MG/5ML; MG/5ML
10 SOLUTION ORAL 3 TIMES DAILY PRN
Qty: 100 ML | Refills: 0 | Status: SHIPPED | OUTPATIENT
Start: 2019-09-11 | End: 2019-09-18

## 2019-09-11 RX ORDER — HYDROCODONE BITARTRATE AND ACETAMINOPHEN 5; 325 MG/1; MG/1
1 TABLET ORAL ONCE
Status: COMPLETED | OUTPATIENT
Start: 2019-09-11 | End: 2019-09-11

## 2019-09-11 RX ADMIN — IPRATROPIUM BROMIDE AND ALBUTEROL SULFATE 1 AMPULE: .5; 3 SOLUTION RESPIRATORY (INHALATION) at 11:44

## 2019-09-11 RX ADMIN — HYDROCODONE BITARTRATE AND ACETAMINOPHEN 1 TABLET: 5; 325 TABLET ORAL at 11:42

## 2019-09-11 ASSESSMENT — PAIN SCALES - GENERAL
PAINLEVEL_OUTOF10: 8
PAINLEVEL_OUTOF10: 8

## 2019-09-11 ASSESSMENT — PAIN DESCRIPTION - PAIN TYPE: TYPE: ACUTE PAIN

## 2019-09-11 ASSESSMENT — PAIN DESCRIPTION - LOCATION: LOCATION: HEAD

## 2019-09-11 NOTE — ED PROVIDER NOTES
Emergency Department Encounter    Patient: Carole Al  MRN: [id number]  : 1975  Date of Evaluation: 2019  ED Provider:  Abhishek Kirkpatrick    Triage Chief Complaint:   Cough (x since last night) and Shortness of Breath    [place]:  Carole Al is a 40 y.o. female that presents URI symptoms, has had chills sore throat nasal congestion and a nonproductive cough just today. No chest pain. She is short of breath believes she has a component of COPD that is flared. She does not have albuterol at home she is out of it. Took nothing for treatment nothing makes symptoms better or worse, she does smoke    ROS - see HPI, below listed is current ROS at time of my eval:  General:  No fevers, + chills, no weakness  Eyes:  No recent vison changes, no discharge  ENT:  + sore throat, + nasal congestion, no hearing changes  Cardiovascular:  No chest pain, no palpitations  Respiratory:  No shortness of breath, + cough, no wheezing  Gastrointestinal:  No pain, no nausea, no vomiting, no diarrhea  Musculoskeletal:  No muscle pain  Skin:  No rash,   Neurologic:  No speech problems, no headache  Genitourinary:  No dysuria  Extremities:  no edema, no pain    Past Medical History:   Diagnosis Date    Asthma     Bipolar disorder ([address]     Depression     Migraine     Schizophrenia ([address]     Scoliosis      Past Surgical History:   Procedure Laterality Date    ABDOMEN SURGERY      laproscope    ANUS SURGERY      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 No pertinent family history.   Social History     Socioeconomic History    Marital status:      Spouse name: Not on file    Number of children: Not on file    Years of education: Not on file    Highest education level: Not on file   Occupational History    Not on file   Social Needs    Financial resource strain: Not on file    Food insecurity:     Worry: Not on file     Inability: Not on file   [employer] needs: Medical: Not on file     Non-medical: Not on file   Tobacco Use    Smoking status: Current Every Day Smoker     Packs/day: 1.00     Years: 20.00     Pack years: 20.00     Types: Cigarettes    Smokeless tobacco: Never Used   Substance and Sexual Activity    Alcohol use: Yes     Comment: occassionally    Drug use: Yes     Types: Marijuana    Sexual activity: Never   Lifestyle    Physical activity:     Days per week: Not on file     Minutes per session: Not on file    Stress: Not on file   Relationships    Social connections:     Talks on phone: Not on file     Gets together: Not on file     Attends Yazidi service: Not on file     Active member of club or organization: Not on file     Attends meetings of clubs or organizations: Not on file     Relationship status: Not on file    Intimate partner violence:     Fear of current or ex partner: Not on file     Emotionally abused: Not on file     Physically abused: Not on file     Forced sexual activity: Not on file   Other Topics Concern    Not on file   Social History Narrative    Not on file     No current facility-administered medications for this encounter. Current Outpatient Medications   Medication Sig Dispense Refill    predniSONE (DELTASONE) 10 MG tablet Take 4 tablets by mouth daily for 6 days 25 tablet 0    guaiFENesin-codeine (GUAIFENESIN AC) 100-10 MG/5ML syrup Take 10 mLs by mouth 3 times daily as needed for Cough for up to 7 days. 100 mL 0    acetaminophen (TYLENOL) 325 MG tablet Take 2 tablets by mouth every 6 hours as needed for Pain 120 tablet 3    lidocaine (LIDODERM) 5 % Place 1 patch onto the skin daily 12 hours on, 12 hours off. 30 patch 0    Pseudoephedrine-DM-GG 60- MG TABS Take 1 tablet by mouth every 6 hours 20 tablet 0    mometasone (NASONEX) 50 MCG/ACT nasal spray 2 sprays by Each Nostril route daily 1 Inhaler 3    LORazepam (ATIVAN) 0.5 MG tablet Take 0.5 mg by mouth every 6 hours as needed for Anxiety.

## 2019-09-12 PROCEDURE — 93010 ELECTROCARDIOGRAM REPORT: CPT | Performed by: INTERNAL MEDICINE

## 2019-09-18 LAB
EKG ATRIAL RATE: 87 BPM
EKG DIAGNOSIS: NORMAL
EKG P AXIS: 54 DEGREES
EKG P-R INTERVAL: 146 MS
EKG Q-T INTERVAL: 348 MS
EKG QRS DURATION: 78 MS
EKG QTC CALCULATION (BAZETT): 418 MS
EKG R AXIS: 68 DEGREES
EKG T AXIS: 43 DEGREES
EKG VENTRICULAR RATE: 87 BPM

## 2019-12-23 ENCOUNTER — HOSPITAL ENCOUNTER (OUTPATIENT)
Age: 44
Discharge: HOME | End: 2019-12-23
Payer: COMMERCIAL

## 2019-12-23 LAB
ANION GAP SERPL CALCULATED.3IONS-SCNC: 11 MMOL/L (ref 4–16)
BACTERIA: NEGATIVE /HPF
BASOPHILS ABSOLUTE: 0 K/CU MM
BASOPHILS RELATIVE PERCENT: 0.4 % (ref 0–1)
BILIRUBIN URINE: NEGATIVE MG/DL
BLOOD, URINE: NEGATIVE
BUN BLDV-MCNC: 6 MG/DL (ref 6–23)
CALCIUM SERPL-MCNC: 8.8 MG/DL (ref 8.3–10.6)
CHLORIDE BLD-SCNC: 103 MMOL/L (ref 99–110)
CLARITY: CLEAR
CO2: 24 MMOL/L (ref 21–32)
COLOR: YELLOW
CREAT SERPL-MCNC: 0.9 MG/DL (ref 0.6–1.1)
DIFFERENTIAL TYPE: ABNORMAL
EOSINOPHILS ABSOLUTE: 0.1 K/CU MM
EOSINOPHILS RELATIVE PERCENT: 2 % (ref 0–3)
FERRITIN: 133 NG/ML (ref 15–150)
GFR AFRICAN AMERICAN: >60 ML/MIN/1.73M2
GFR NON-AFRICAN AMERICAN: >60 ML/MIN/1.73M2
GLUCOSE BLD-MCNC: 92 MG/DL (ref 70–99)
GLUCOSE, URINE: NEGATIVE MG/DL
HCT VFR BLD CALC: 45 % (ref 37–47)
HEMOGLOBIN: 14.2 GM/DL (ref 12.5–16)
IMMATURE NEUTROPHIL %: 0.2 % (ref 0–0.43)
IRON: 64 UG/DL (ref 37–145)
KETONES, URINE: NEGATIVE MG/DL
LEUKOCYTE ESTERASE, URINE: NEGATIVE
LYMPHOCYTES ABSOLUTE: 1.5 K/CU MM
LYMPHOCYTES RELATIVE PERCENT: 33.4 % (ref 24–44)
MCH RBC QN AUTO: 32.3 PG (ref 27–31)
MCHC RBC AUTO-ENTMCNC: 31.6 % (ref 32–36)
MCV RBC AUTO: 102.3 FL (ref 78–100)
MONOCYTES ABSOLUTE: 0.5 K/CU MM
MONOCYTES RELATIVE PERCENT: 10.2 % (ref 0–4)
MUCUS: ABNORMAL HPF
NITRITE URINE, QUANTITATIVE: NEGATIVE
NUCLEATED RBC %: 0 %
PCT TRANSFERRIN: 19 % (ref 10–44)
PDW BLD-RTO: 12.8 % (ref 11.7–14.9)
PH, URINE: 7 (ref 5–8)
PLATELET # BLD: 228 K/CU MM (ref 140–440)
PMV BLD AUTO: 9.8 FL (ref 7.5–11.1)
POTASSIUM SERPL-SCNC: 3.8 MMOL/L (ref 3.5–5.1)
PROTEIN UA: NEGATIVE MG/DL
RBC # BLD: 4.4 M/CU MM (ref 4.2–5.4)
RBC URINE: <1 /HPF (ref 0–6)
SEGMENTED NEUTROPHILS ABSOLUTE COUNT: 2.4 K/CU MM
SEGMENTED NEUTROPHILS RELATIVE PERCENT: 53.8 % (ref 36–66)
SODIUM BLD-SCNC: 138 MMOL/L (ref 135–145)
SPECIFIC GRAVITY UA: 1.01 (ref 1–1.03)
SQUAMOUS EPITHELIAL: 2 /HPF
TOTAL IMMATURE NEUTOROPHIL: 0.01 K/CU MM
TOTAL IRON BINDING CAPACITY: 343 UG/DL (ref 250–450)
TOTAL NUCLEATED RBC: 0 K/CU MM
TRICHOMONAS: ABNORMAL /HPF
UNSATURATED IRON BINDING CAPACITY: 279 UG/DL (ref 110–370)
UROBILINOGEN, URINE: NORMAL MG/DL (ref 0.2–1)
WBC # BLD: 4.5 K/CU MM (ref 4–10.5)
WBC UA: ABNORMAL /HPF (ref 0–5)

## 2019-12-23 PROCEDURE — 83540 ASSAY OF IRON: CPT

## 2019-12-23 PROCEDURE — 82728 ASSAY OF FERRITIN: CPT

## 2019-12-23 PROCEDURE — 80048 BASIC METABOLIC PNL TOTAL CA: CPT

## 2019-12-23 PROCEDURE — 83550 IRON BINDING TEST: CPT

## 2019-12-23 PROCEDURE — 36415 COLL VENOUS BLD VENIPUNCTURE: CPT

## 2019-12-23 PROCEDURE — 85025 COMPLETE CBC W/AUTO DIFF WBC: CPT

## 2019-12-23 PROCEDURE — 87086 URINE CULTURE/COLONY COUNT: CPT

## 2019-12-23 PROCEDURE — 81001 URINALYSIS AUTO W/SCOPE: CPT

## 2019-12-24 LAB
CULTURE: NORMAL
Lab: NORMAL
SPECIMEN: NORMAL

## 2020-01-03 ENCOUNTER — HOSPITAL ENCOUNTER (OUTPATIENT)
Dept: CT IMAGING | Age: 45
Discharge: HOME | End: 2020-01-03
Payer: COMMERCIAL

## 2020-01-03 PROCEDURE — 74176 CT ABD & PELVIS W/O CONTRAST: CPT

## 2020-01-21 ENCOUNTER — HOSPITAL ENCOUNTER (INPATIENT)
Age: 45
LOS: 9 days | Discharge: HOME HEALTH | DRG: 513 | End: 2020-01-30
Attending: EMERGENCY MEDICINE | Admitting: FAMILY MEDICINE
Payer: COMMERCIAL

## 2020-01-21 ENCOUNTER — APPOINTMENT (OUTPATIENT)
Dept: CT IMAGING | Age: 45
DRG: 513 | End: 2020-01-21
Payer: COMMERCIAL

## 2020-01-21 ENCOUNTER — APPOINTMENT (OUTPATIENT)
Dept: ULTRASOUND IMAGING | Age: 45
DRG: 513 | End: 2020-01-21
Payer: COMMERCIAL

## 2020-01-21 PROBLEM — D72.829 LEUKOCYTOSIS: Status: ACTIVE | Noted: 2020-01-21

## 2020-01-21 PROBLEM — R11.2 NAUSEA & VOMITING: Status: ACTIVE | Noted: 2020-01-21

## 2020-01-21 PROBLEM — R10.9 ABDOMINAL PAIN: Status: ACTIVE | Noted: 2020-01-21

## 2020-01-21 LAB
ALBUMIN SERPL-MCNC: 4.2 GM/DL (ref 3.4–5)
ALP BLD-CCNC: 166 IU/L (ref 40–129)
ALT SERPL-CCNC: 30 U/L (ref 10–40)
ANION GAP SERPL CALCULATED.3IONS-SCNC: 20 MMOL/L (ref 4–16)
AST SERPL-CCNC: 39 IU/L (ref 15–37)
BACTERIA: ABNORMAL /HPF
BASOPHILS ABSOLUTE: 0.1 K/CU MM
BASOPHILS RELATIVE PERCENT: 0.3 % (ref 0–1)
BILIRUB SERPL-MCNC: 0.4 MG/DL (ref 0–1)
BILIRUBIN URINE: NEGATIVE MG/DL
BLOOD, URINE: ABNORMAL
BUN BLDV-MCNC: 6 MG/DL (ref 6–23)
CALCIUM SERPL-MCNC: 9.2 MG/DL (ref 8.3–10.6)
CHLORIDE BLD-SCNC: 97 MMOL/L (ref 99–110)
CLARITY: ABNORMAL
CO2: 23 MMOL/L (ref 21–32)
COLOR: YELLOW
CREAT SERPL-MCNC: 0.8 MG/DL (ref 0.6–1.1)
DIFFERENTIAL TYPE: ABNORMAL
EOSINOPHILS ABSOLUTE: 0 K/CU MM
EOSINOPHILS RELATIVE PERCENT: 0 % (ref 0–3)
GFR AFRICAN AMERICAN: >60 ML/MIN/1.73M2
GFR NON-AFRICAN AMERICAN: >60 ML/MIN/1.73M2
GLUCOSE BLD-MCNC: 115 MG/DL (ref 70–99)
GLUCOSE, URINE: NEGATIVE MG/DL
HCG QUALITATIVE: NEGATIVE
HCT VFR BLD CALC: 45.4 % (ref 37–47)
HEMOGLOBIN: 14.6 GM/DL (ref 12.5–16)
IMMATURE NEUTROPHIL %: 1 % (ref 0–0.43)
KETONES, URINE: NEGATIVE MG/DL
LACTIC ACID, SEPSIS: 1.6 MMOL/L (ref 0.5–1.9)
LACTIC ACID, SEPSIS: 1.7 MMOL/L (ref 0.5–1.9)
LEUKOCYTE ESTERASE, URINE: ABNORMAL
LIPASE: 15 IU/L (ref 13–60)
LYMPHOCYTES ABSOLUTE: 1.1 K/CU MM
LYMPHOCYTES RELATIVE PERCENT: 5.6 % (ref 24–44)
MCH RBC QN AUTO: 31.7 PG (ref 27–31)
MCHC RBC AUTO-ENTMCNC: 32.2 % (ref 32–36)
MCV RBC AUTO: 98.5 FL (ref 78–100)
MONOCYTES ABSOLUTE: 1.2 K/CU MM
MONOCYTES RELATIVE PERCENT: 6.1 % (ref 0–4)
NITRITE URINE, QUANTITATIVE: NEGATIVE
PDW BLD-RTO: 12.3 % (ref 11.7–14.9)
PH, URINE: 5 (ref 5–8)
PLATELET # BLD: 252 K/CU MM (ref 140–440)
PMV BLD AUTO: 9.7 FL (ref 7.5–11.1)
POTASSIUM SERPL-SCNC: 3.2 MMOL/L (ref 3.5–5.1)
PROTEIN UA: NEGATIVE MG/DL
RBC # BLD: 4.61 M/CU MM (ref 4.2–5.4)
RBC URINE: 3 /HPF (ref 0–6)
SEGMENTED NEUTROPHILS ABSOLUTE COUNT: 16.7 K/CU MM
SEGMENTED NEUTROPHILS RELATIVE PERCENT: 87 % (ref 36–66)
SODIUM BLD-SCNC: 140 MMOL/L (ref 135–145)
SPECIFIC GRAVITY UA: ABNORMAL (ref 1–1.03)
SPECIFIC GRAVITY UA: ABNORMAL (ref 1–1.03)
SQUAMOUS EPITHELIAL: 38 /HPF
TOTAL IMMATURE NEUTOROPHIL: 0.19 K/CU MM
TOTAL PROTEIN: 7.1 GM/DL (ref 6.4–8.2)
TRICHOMONAS: ABNORMAL /HPF
UROBILINOGEN, URINE: NORMAL MG/DL (ref 0.2–1)
WBC # BLD: 19.3 K/CU MM (ref 4–10.5)
WBC UA: 8 /HPF (ref 0–5)

## 2020-01-21 PROCEDURE — 96365 THER/PROPH/DIAG IV INF INIT: CPT

## 2020-01-21 PROCEDURE — 2580000003 HC RX 258: Performed by: PHYSICIAN ASSISTANT

## 2020-01-21 PROCEDURE — 2580000003 HC RX 258: Performed by: FAMILY MEDICINE

## 2020-01-21 PROCEDURE — 6360000002 HC RX W HCPCS: Performed by: EMERGENCY MEDICINE

## 2020-01-21 PROCEDURE — 96367 TX/PROPH/DG ADDL SEQ IV INF: CPT

## 2020-01-21 PROCEDURE — 81001 URINALYSIS AUTO W/SCOPE: CPT

## 2020-01-21 PROCEDURE — 6360000002 HC RX W HCPCS: Performed by: OBSTETRICS & GYNECOLOGY

## 2020-01-21 PROCEDURE — 0UT10ZZ RESECTION OF LEFT OVARY, OPEN APPROACH: ICD-10-PCS | Performed by: OBSTETRICS & GYNECOLOGY

## 2020-01-21 PROCEDURE — 99285 EMERGENCY DEPT VISIT HI MDM: CPT

## 2020-01-21 PROCEDURE — 76830 TRANSVAGINAL US NON-OB: CPT

## 2020-01-21 PROCEDURE — 85025 COMPLETE CBC W/AUTO DIFF WBC: CPT

## 2020-01-21 PROCEDURE — 0UN24ZZ RELEASE BILATERAL OVARIES, PERCUTANEOUS ENDOSCOPIC APPROACH: ICD-10-PCS | Performed by: OBSTETRICS & GYNECOLOGY

## 2020-01-21 PROCEDURE — 74177 CT ABD & PELVIS W/CONTRAST: CPT

## 2020-01-21 PROCEDURE — 83605 ASSAY OF LACTIC ACID: CPT

## 2020-01-21 PROCEDURE — 0DNN0ZZ RELEASE SIGMOID COLON, OPEN APPROACH: ICD-10-PCS | Performed by: OBSTETRICS & GYNECOLOGY

## 2020-01-21 PROCEDURE — 80053 COMPREHEN METABOLIC PANEL: CPT

## 2020-01-21 PROCEDURE — 87591 N.GONORRHOEAE DNA AMP PROB: CPT

## 2020-01-21 PROCEDURE — 0DNL0ZZ RELEASE TRANSVERSE COLON, OPEN APPROACH: ICD-10-PCS | Performed by: OBSTETRICS & GYNECOLOGY

## 2020-01-21 PROCEDURE — 84703 CHORIONIC GONADOTROPIN ASSAY: CPT

## 2020-01-21 PROCEDURE — 0UN10ZZ RELEASE LEFT OVARY, OPEN APPROACH: ICD-10-PCS | Performed by: OBSTETRICS & GYNECOLOGY

## 2020-01-21 PROCEDURE — 0DNW0ZZ RELEASE PERITONEUM, OPEN APPROACH: ICD-10-PCS | Performed by: OBSTETRICS & GYNECOLOGY

## 2020-01-21 PROCEDURE — 87086 URINE CULTURE/COLONY COUNT: CPT

## 2020-01-21 PROCEDURE — 0T9B70Z DRAINAGE OF BLADDER WITH DRAINAGE DEVICE, VIA NATURAL OR ARTIFICIAL OPENING: ICD-10-PCS | Performed by: OBSTETRICS & GYNECOLOGY

## 2020-01-21 PROCEDURE — 96366 THER/PROPH/DIAG IV INF ADDON: CPT

## 2020-01-21 PROCEDURE — 96376 TX/PRO/DX INJ SAME DRUG ADON: CPT

## 2020-01-21 PROCEDURE — 6370000000 HC RX 637 (ALT 250 FOR IP): Performed by: FAMILY MEDICINE

## 2020-01-21 PROCEDURE — 6360000002 HC RX W HCPCS: Performed by: PHYSICIAN ASSISTANT

## 2020-01-21 PROCEDURE — 83690 ASSAY OF LIPASE: CPT

## 2020-01-21 PROCEDURE — 36415 COLL VENOUS BLD VENIPUNCTURE: CPT

## 2020-01-21 PROCEDURE — 87040 BLOOD CULTURE FOR BACTERIA: CPT

## 2020-01-21 PROCEDURE — 2500000003 HC RX 250 WO HCPCS: Performed by: OBSTETRICS & GYNECOLOGY

## 2020-01-21 PROCEDURE — 93975 VASCULAR STUDY: CPT

## 2020-01-21 PROCEDURE — 0UN60ZZ RELEASE LEFT FALLOPIAN TUBE, OPEN APPROACH: ICD-10-PCS | Performed by: OBSTETRICS & GYNECOLOGY

## 2020-01-21 PROCEDURE — 6360000004 HC RX CONTRAST MEDICATION: Performed by: EMERGENCY MEDICINE

## 2020-01-21 PROCEDURE — 2580000003 HC RX 258: Performed by: OBSTETRICS & GYNECOLOGY

## 2020-01-21 PROCEDURE — 0W9J4ZZ DRAINAGE OF PELVIC CAVITY, PERCUTANEOUS ENDOSCOPIC APPROACH: ICD-10-PCS | Performed by: OBSTETRICS & GYNECOLOGY

## 2020-01-21 PROCEDURE — 0UT60ZZ RESECTION OF LEFT FALLOPIAN TUBE, OPEN APPROACH: ICD-10-PCS | Performed by: OBSTETRICS & GYNECOLOGY

## 2020-01-21 PROCEDURE — 1200000000 HC SEMI PRIVATE

## 2020-01-21 PROCEDURE — 96361 HYDRATE IV INFUSION ADD-ON: CPT

## 2020-01-21 PROCEDURE — 87491 CHLMYD TRACH DNA AMP PROBE: CPT

## 2020-01-21 PROCEDURE — 2580000003 HC RX 258: Performed by: EMERGENCY MEDICINE

## 2020-01-21 PROCEDURE — 96375 TX/PRO/DX INJ NEW DRUG ADDON: CPT

## 2020-01-21 RX ORDER — MULTIVITAMIN WITH FOLIC ACID 400 MCG
1 TABLET ORAL DAILY
Status: DISCONTINUED | OUTPATIENT
Start: 2020-01-22 | End: 2020-01-23

## 2020-01-21 RX ORDER — PROCHLORPERAZINE MALEATE 5 MG/1
5 TABLET ORAL EVERY 6 HOURS PRN
Status: DISCONTINUED | OUTPATIENT
Start: 2020-01-21 | End: 2020-01-23

## 2020-01-21 RX ORDER — DIPHENHYDRAMINE HCL 25 MG
25 TABLET ORAL EVERY 6 HOURS PRN
Status: DISCONTINUED | OUTPATIENT
Start: 2020-01-21 | End: 2020-01-23

## 2020-01-21 RX ORDER — HYDROMORPHONE HCL 110MG/55ML
0.5 PATIENT CONTROLLED ANALGESIA SYRINGE INTRAVENOUS
Status: DISCONTINUED | OUTPATIENT
Start: 2020-01-21 | End: 2020-01-23

## 2020-01-21 RX ORDER — VANCOMYCIN HYDROCHLORIDE 1 G/200ML
1000 INJECTION, SOLUTION INTRAVENOUS EVERY 12 HOURS
Status: DISCONTINUED | OUTPATIENT
Start: 2020-01-22 | End: 2020-01-23

## 2020-01-21 RX ORDER — ROPINIROLE 1 MG/1
1 TABLET, FILM COATED ORAL NIGHTLY
COMMUNITY
Start: 2019-12-11

## 2020-01-21 RX ORDER — SODIUM CHLORIDE 0.9 % (FLUSH) 0.9 %
10 SYRINGE (ML) INJECTION EVERY 12 HOURS SCHEDULED
Status: DISCONTINUED | OUTPATIENT
Start: 2020-01-21 | End: 2020-01-21

## 2020-01-21 RX ORDER — ONDANSETRON 2 MG/ML
8 INJECTION INTRAMUSCULAR; INTRAVENOUS ONCE
Status: COMPLETED | OUTPATIENT
Start: 2020-01-21 | End: 2020-01-21

## 2020-01-21 RX ORDER — MULTIVITAMIN WITH FOLIC ACID 400 MCG
1 TABLET ORAL DAILY
COMMUNITY
Start: 2020-01-15

## 2020-01-21 RX ORDER — ONDANSETRON 4 MG/1
4 TABLET, ORALLY DISINTEGRATING ORAL EVERY 8 HOURS PRN
Status: DISCONTINUED | OUTPATIENT
Start: 2020-01-21 | End: 2020-01-23

## 2020-01-21 RX ORDER — ALBUTEROL SULFATE 90 UG/1
2 AEROSOL, METERED RESPIRATORY (INHALATION) EVERY 4 HOURS PRN
Status: DISCONTINUED | OUTPATIENT
Start: 2020-01-21 | End: 2020-01-23

## 2020-01-21 RX ORDER — ONDANSETRON 2 MG/ML
4 INJECTION INTRAMUSCULAR; INTRAVENOUS EVERY 6 HOURS PRN
Status: DISCONTINUED | OUTPATIENT
Start: 2020-01-21 | End: 2020-01-23

## 2020-01-21 RX ORDER — 0.9 % SODIUM CHLORIDE 0.9 %
1000 INTRAVENOUS SOLUTION INTRAVENOUS ONCE
Status: COMPLETED | OUTPATIENT
Start: 2020-01-21 | End: 2020-01-21

## 2020-01-21 RX ORDER — FENTANYL CITRATE 50 UG/ML
25 INJECTION, SOLUTION INTRAMUSCULAR; INTRAVENOUS ONCE
Status: COMPLETED | OUTPATIENT
Start: 2020-01-21 | End: 2020-01-21

## 2020-01-21 RX ORDER — ONDANSETRON 2 MG/ML
4 INJECTION INTRAMUSCULAR; INTRAVENOUS ONCE
Status: COMPLETED | OUTPATIENT
Start: 2020-01-21 | End: 2020-01-21

## 2020-01-21 RX ORDER — HYDROMORPHONE HCL 110MG/55ML
0.5 PATIENT CONTROLLED ANALGESIA SYRINGE INTRAVENOUS ONCE
Status: COMPLETED | OUTPATIENT
Start: 2020-01-21 | End: 2020-01-21

## 2020-01-21 RX ORDER — FENTANYL CITRATE 50 UG/ML
50 INJECTION, SOLUTION INTRAMUSCULAR; INTRAVENOUS EVERY 30 MIN PRN
Status: COMPLETED | OUTPATIENT
Start: 2020-01-21 | End: 2020-01-21

## 2020-01-21 RX ORDER — SODIUM CHLORIDE 0.9 % (FLUSH) 0.9 %
10 SYRINGE (ML) INJECTION PRN
Status: DISCONTINUED | OUTPATIENT
Start: 2020-01-21 | End: 2020-01-23

## 2020-01-21 RX ORDER — SODIUM CHLORIDE 0.9 % (FLUSH) 0.9 %
10 SYRINGE (ML) INJECTION PRN
Status: DISCONTINUED | OUTPATIENT
Start: 2020-01-21 | End: 2020-01-21

## 2020-01-21 RX ORDER — FLUTICASONE PROPIONATE 50 MCG
1 SPRAY, SUSPENSION (ML) NASAL DAILY
COMMUNITY
Start: 2020-01-07

## 2020-01-21 RX ORDER — SODIUM CHLORIDE 0.9 % (FLUSH) 0.9 %
10 SYRINGE (ML) INJECTION 2 TIMES DAILY
Status: DISCONTINUED | OUTPATIENT
Start: 2020-01-22 | End: 2020-01-23

## 2020-01-21 RX ORDER — VANCOMYCIN HYDROCHLORIDE 1 G/200ML
1000 INJECTION, SOLUTION INTRAVENOUS ONCE
Status: COMPLETED | OUTPATIENT
Start: 2020-01-21 | End: 2020-01-21

## 2020-01-21 RX ORDER — ROPINIROLE 1 MG/1
1 TABLET, FILM COATED ORAL NIGHTLY
Status: DISCONTINUED | OUTPATIENT
Start: 2020-01-22 | End: 2020-01-23

## 2020-01-21 RX ORDER — FLUTICASONE PROPIONATE 50 MCG
1 SPRAY, SUSPENSION (ML) NASAL DAILY
Status: DISCONTINUED | OUTPATIENT
Start: 2020-01-22 | End: 2020-01-23

## 2020-01-21 RX ORDER — OXYCODONE HYDROCHLORIDE AND ACETAMINOPHEN 5; 325 MG/1; MG/1
1 TABLET ORAL EVERY 8 HOURS PRN
Status: DISCONTINUED | OUTPATIENT
Start: 2020-01-21 | End: 2020-01-23

## 2020-01-21 RX ORDER — SODIUM CHLORIDE 9 MG/ML
INJECTION, SOLUTION INTRAVENOUS CONTINUOUS
Status: DISCONTINUED | OUTPATIENT
Start: 2020-01-22 | End: 2020-01-23

## 2020-01-21 RX ORDER — LORAZEPAM 0.5 MG/1
0.5 TABLET ORAL EVERY 6 HOURS PRN
Status: DISCONTINUED | OUTPATIENT
Start: 2020-01-21 | End: 2020-01-23

## 2020-01-21 RX ORDER — OXYCODONE HYDROCHLORIDE AND ACETAMINOPHEN 5; 325 MG/1; MG/1
1 TABLET ORAL DAILY
Status: ON HOLD | COMMUNITY
Start: 2020-01-14 | End: 2020-01-30 | Stop reason: HOSPADM

## 2020-01-21 RX ORDER — LYSINE HCL 500 MG
1 TABLET ORAL DAILY
COMMUNITY
Start: 2020-01-15 | End: 2021-02-10

## 2020-01-21 RX ORDER — QUETIAPINE FUMARATE 100 MG/1
100 TABLET, FILM COATED ORAL NIGHTLY
Status: DISCONTINUED | OUTPATIENT
Start: 2020-01-22 | End: 2020-01-23

## 2020-01-21 RX ADMIN — CEFOXITIN SODIUM 2 G: 2 POWDER, FOR SOLUTION INTRAVENOUS at 20:55

## 2020-01-21 RX ADMIN — CEFEPIME HYDROCHLORIDE 2 G: 2 INJECTION, POWDER, FOR SOLUTION INTRAVENOUS at 13:45

## 2020-01-21 RX ADMIN — SODIUM CHLORIDE 1000 ML: 9 INJECTION, SOLUTION INTRAVENOUS at 12:37

## 2020-01-21 RX ADMIN — HYDROMORPHONE HYDROCHLORIDE 0.5 MG: 2 INJECTION, SOLUTION INTRAMUSCULAR; INTRAVENOUS; SUBCUTANEOUS at 22:49

## 2020-01-21 RX ADMIN — HYDROMORPHONE HYDROCHLORIDE 0.5 MG: 2 INJECTION, SOLUTION INTRAMUSCULAR; INTRAVENOUS; SUBCUTANEOUS at 16:54

## 2020-01-21 RX ADMIN — FENTANYL CITRATE 25 MCG: 50 INJECTION INTRAMUSCULAR; INTRAVENOUS at 14:34

## 2020-01-21 RX ADMIN — SODIUM CHLORIDE 1000 ML: 9 INJECTION, SOLUTION INTRAVENOUS at 15:12

## 2020-01-21 RX ADMIN — FENTANYL CITRATE 50 MCG: 50 INJECTION INTRAMUSCULAR; INTRAVENOUS at 16:26

## 2020-01-21 RX ADMIN — HYDROMORPHONE HYDROCHLORIDE 0.5 MG: 2 INJECTION, SOLUTION INTRAMUSCULAR; INTRAVENOUS; SUBCUTANEOUS at 23:59

## 2020-01-21 RX ADMIN — SODIUM CHLORIDE: 9 INJECTION, SOLUTION INTRAVENOUS at 23:59

## 2020-01-21 RX ADMIN — HYDROMORPHONE HYDROCHLORIDE 0.5 MG: 2 INJECTION, SOLUTION INTRAMUSCULAR; INTRAVENOUS; SUBCUTANEOUS at 18:29

## 2020-01-21 RX ADMIN — Medication 10 ML: at 13:46

## 2020-01-21 RX ADMIN — VANCOMYCIN HYDROCHLORIDE 1000 MG: 1 INJECTION, SOLUTION INTRAVENOUS at 14:23

## 2020-01-21 RX ADMIN — HYDROMORPHONE HYDROCHLORIDE 0.5 MG: 2 INJECTION, SOLUTION INTRAMUSCULAR; INTRAVENOUS; SUBCUTANEOUS at 20:25

## 2020-01-21 RX ADMIN — DOXYCYCLINE 100 MG: 100 INJECTION, POWDER, LYOPHILIZED, FOR SOLUTION INTRAVENOUS at 21:31

## 2020-01-21 RX ADMIN — FENTANYL CITRATE 50 MCG: 50 INJECTION INTRAMUSCULAR; INTRAVENOUS at 15:26

## 2020-01-21 RX ADMIN — ONDANSETRON 8 MG: 2 INJECTION INTRAMUSCULAR; INTRAVENOUS at 15:27

## 2020-01-21 RX ADMIN — IOPAMIDOL 75 ML: 755 INJECTION, SOLUTION INTRAVENOUS at 13:46

## 2020-01-21 RX ADMIN — ONDANSETRON 4 MG: 2 INJECTION INTRAMUSCULAR; INTRAVENOUS at 12:37

## 2020-01-21 RX ADMIN — FENTANYL CITRATE 25 MCG: 50 INJECTION INTRAMUSCULAR; INTRAVENOUS at 13:45

## 2020-01-21 ASSESSMENT — PAIN SCALES - GENERAL
PAINLEVEL_OUTOF10: 10
PAINLEVEL_OUTOF10: 8
PAINLEVEL_OUTOF10: 7
PAINLEVEL_OUTOF10: 10
PAINLEVEL_OUTOF10: 7
PAINLEVEL_OUTOF10: 9
PAINLEVEL_OUTOF10: 10
PAINLEVEL_OUTOF10: 7
PAINLEVEL_OUTOF10: 10

## 2020-01-21 ASSESSMENT — PAIN DESCRIPTION - DESCRIPTORS
DESCRIPTORS: CRAMPING
DESCRIPTORS: ACHING

## 2020-01-21 ASSESSMENT — PAIN DESCRIPTION - FREQUENCY
FREQUENCY: INTERMITTENT
FREQUENCY: CONTINUOUS

## 2020-01-21 ASSESSMENT — PAIN DESCRIPTION - PAIN TYPE
TYPE: ACUTE PAIN
TYPE: ACUTE PAIN

## 2020-01-21 ASSESSMENT — PAIN DESCRIPTION - ONSET: ONSET: GRADUAL

## 2020-01-21 ASSESSMENT — PAIN DESCRIPTION - LOCATION
LOCATION: BACK
LOCATION: ABDOMEN;BACK

## 2020-01-21 ASSESSMENT — PAIN DESCRIPTION - ORIENTATION
ORIENTATION: LOWER;MID
ORIENTATION: MID;RIGHT

## 2020-01-21 ASSESSMENT — PAIN DESCRIPTION - PROGRESSION: CLINICAL_PROGRESSION: NOT CHANGED

## 2020-01-21 ASSESSMENT — PAIN - FUNCTIONAL ASSESSMENT: PAIN_FUNCTIONAL_ASSESSMENT: ACTIVITIES ARE NOT PREVENTED

## 2020-01-21 NOTE — ED PROVIDER NOTES
EMERGENCY DEPARTMENT ENCOUNTER      PCP: Nahid Nair MD    [address]    Chief Complaint   Patient presents with    Abdominal Pain    Back Pain    Nausea         Of note, this patient was also evaluated by the attending physician, Dr. Ora Nixon. HPI    Fred Cerda is a 40 y.o. female who presents with abdominal pain, bilateral low back pain, and nausea. Patient's abdominal pain is been ongoing for at least a month, but for the last 6 days patient reports that she has been having worsening diffuse abdominal pain, bilateral low back pain, and reports that nausea and vomiting began this morning for her. She reports that she has not had a bowel movement in 6 days and is having difficulty with urination for the last week as well. Patient reports that she is straining to pass urine and that even then she is only dribbling urine. Patient reports nonbloody, nonbilious emesis today. Patient reports that she has seen her PCP for the symptoms and he ordered an outpatient CT of her abdomen and pelvis that showed bilateral pelvic adnexal masses for which patient has plan for follow-up with gynecology. Patient reports that she also saw GI regarding her symptoms in the last week and they scheduled her for a colonoscopy next month. They also started her on stool softeners to try to help with her symptoms. Patient reports associated subjective fevers and chills. Patient reports her abdominal pain radiates into the bilateral lower back. Severity of pain is rated 10 out of 10. Patient denies direct injury, trauma, dysuria, urinary urgency, urinary frequency, vaginal symptoms, melena, hematochezia, hematemesis, diarrhea, chest pain, shortness of breath.     REVIEW OF SYSTEMS    Constitutional:  + subjective fever, chills  HENT:  Denies sore throat or ear pain   Cardiovascular:  Denies chest pain, palpitations or swelling   Respiratory:  Denies cough or shortness of breath   GI:  See HPI above  : See HPI  Musculoskeletal:  Denies back pain or groin pain or masses. No pain or swelling of extremities. Skin:  Denies rash  Neurologic:  Denies headache, focal weakness or sensory changes   Endocrine:  Denies polyuria or polydypsia   Lymphatic:  Denies swollen glands     All other review of systems are negative  See HPI and nursing notes for additional information     PAST MEDICAL & SURGICAL HISTORY    Past Medical History:   Diagnosis Date    Asthma     [facility] disorder ([facility] [address]     Depression     Migraine     Schizophrenia ([facility] [address]     Scoliosis      Past Surgical History:   Procedure Laterality Date    ABDOMEN SURGERY      laproscope    ANUS SURGERY      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 Thanks. 1 Inhaler 1    acetaminophen (TYLENOL) 325 MG tablet Take 2 tablets by mouth every 6 hours as needed for Pain 120 tablet 3    lidocaine (LIDODERM) 5 % Place 1 patch onto the skin daily 12 hours on, 12 hours off. 30 patch 0    Pseudoephedrine-DM-GG 60- MG TABS Take 1 tablet by mouth every 6 hours 20 tablet 0    mometasone (NASONEX) 50 MCG/ACT nasal spray 2 sprays by Each Nostril route daily 1 Inhaler 3    LORazepam (ATIVAN) 0.5 MG tablet Take 0.5 mg by mouth every 6 hours as needed for Anxiety.  QUEtiapine (SEROQUEL) 50 MG tablet Take 50 mg by mouth 2 times daily      vitamin D (CHOLECALCIFEROL) 1000 UNIT TABS tablet Take 1,000 Units by mouth once a week      polycarbophil (FIBERCON) 625 MG tablet Take 625 mg by mouth daily      butalbital-aspirin-caffeine (FIORINAL) -40 MG capsule Take by mouth every 4 hours as needed for Headaches.       prochlorperazine (COMPAZINE) 5 MG tablet Take 5 mg by Temp 98.1 °F (36.7 °C) (Oral)   Resp 18   Ht 5' 6\" (1.676 m)   Wt 107 lb (48.5 kg)   LMP 12/24/2019   SpO2 99%   BMI 17.27 kg/m²   Constitutional:  Well developed, well nourished. No distress  Eyes:  Sclera nonicteric, conjunctiva moist  HENT:  Atraumatic. PERRL. EOMI. Moist mucus membranes. Neck/Lymphatics: supple, no JVD, no swollen nodes  Respiratory:  No retractions, no accessory muscle use, normal breath sounds   Cardiovascular: Tachycardic rate, normal rhythm, no murmurs    GI:    No gross discoloration.       -no Robbins's sign (periumbilical ecchymosis)       -no Grey-Burks's sign (flank ecchymosis)    Bowel sounds present, no audible bruits. Soft,  + slight right sided distention, no guarding, no rigidity,   + diffuse abdominal tenderness to palpation in all quadrants without guarding or rigidity, no rebound tenderness, no palpable pulsatile masses,   No increased tenderness palpation over McBurney's point   Negative Rovsing sign    Negative Dillard's sign. Back:   SPINE: There is no cervical, thoracic or lumbar midline tenderness to palpation, step-offs, or acute deformities. There is bilateral paralumbar tenderness to palpation without point tenderness. No parathoracic or paracervical tenderness to palpation. No overlying skin changes, masses, deformities.     Musculoskeletal:  No edema, no deformity  Vascular: DP pulses 2+ equal bilaterally  Integument: No rash, dry skin  Neurologic:  Alert & oriented, normal speech  Psychiatric: Cooperative, pleasant affect       LABS:  Results for orders placed or performed during the hospital encounter of 01/21/20   CBC with Auto Diff   Result Value Ref Range    WBC 19.3 (H) 4.0 - 10.5 K/CU MM    RBC 4.61 4.2 - 5.4 M/CU MM    Hemoglobin 14.6 12.5 - 16.0 GM/DL    Hematocrit 45.4 37 - 47 %    MCV 98.5 78 - 100 FL    MCH 31.7 (H) 27 - 31 PG    MCHC 32.2 32.0 - 36.0 %    RDW 12.3 11.7 - 14.9 %    Platelets 292 260 - 863 K/CU MM    MPV 9.7 7.5 - 11.1 FL further details, remaining Emergency Department course, final disposition and clinical impressions. At this time will plan for admission to pt's PCP at our facility for further evaluation and care. CRITICAL CARE NOTE:  There was a high probability of clinically significant life-threatening deterioration of the patient's condition requiring my urgent intervention due to sepsis. IV antibiotics, IV fluids, consultation with gynecology was performed to address this. Total critical care time is at least  35 minutes. This includes vital sign monitoring, pulse oximetry monitoring, telemetry monitoring, clinical response to the IV medications, reviewing the nursing notes, consultation time, dictation/documentation time, and interpretation of the lab work. This time excludes time spent performing procedures and separately billable procedures and family discussion time. Clinical  IMPRESSION    1. Sepsis, due to unspecified organism, unspecified whether acute organ dysfunction present ([address]    2. Non-intractable vomiting with nausea, unspecified vomiting type    3. Abdominal pain, unspecified abdominal location    4. Constipation, unspecified constipation type    5. Acute bilateral low back pain without sciatica    6. Abnormal CT scan          Comment: Please note this report has been produced using speech recognition software and may contain errors related to that system including errors in grammar, punctuation, and spelling, as well as words and phrases that may be inappropriate. If there are any questions or concerns please feel free to contact the dictating provider for clarification.              Michela Bass PA-C  01/21/20 6079

## 2020-01-21 NOTE — PROGRESS NOTES
[address]  Pharmacy consulted by DIXON Velasco Mc for monitoring and adjustment. Indication for treatment: Empiric  Goal trough: 15 mcg/mL  Other antimicrobials: Cefepime    Ht Readings from Last 1 Encounters:   01/21/20 5' 6\" (1.676 m)     Wt Readings from Last 3 Encounters:   01/21/20 107 lb (48.5 kg)   09/11/19 105 lb (47.6 kg)   08/11/19 108 lb (49 kg)        Pertinent Laboratory Values:   Temp Readings from Last 3 Encounters:   01/21/20 98.1 °F (36.7 °C) (Oral)   09/11/19 99.1 °F (37.3 °C) (Oral)   08/11/19 98.3 °F (36.8 °C) (Oral)     Recent Labs     01/21/20  1153   WBC 19.3*     Recent Labs     01/21/20  1153   BUN 6   CREATININE 0.8     Estimated Creatinine Clearance: 69 mL/min (based on SCr of 0.8 mg/dL). No intake or output data in the 24 hours ending 01/21/20 1349      Previous vancomycin levels:  TROUGH:  No results for input(s): VANCOTROUGH in the last 72 hours. RANDOM:  No results for input(s): VANCORANDOM in the last 72 hours. Assessment/Plan:  Vancomycin 1000 mg IVPB x 1 dose in the ER. No additional doses or levels ordered at this time. Please reorder vancomycin as inpatient if appropriate to continue.     Thank you for the consult,    Frances Fernando, PharmD, [facility]

## 2020-01-21 NOTE — ED PROVIDER NOTES
I independently examined and evaluated [place]. Chief Complaint   Patient presents with    Abdominal Pain    Back Pain    Nausea       In brief, 44-year-old female presents with lower abdominal and back pain as well as nausea for 6 days. Pain is been worsening, constant, no aggravating or relieving factors. She has had an episode of emesis. Has had subjective fevers and chills. Focused exam revealed ill-appearing female in no acute distress, tachycardic, lower abdominal tenderness to palpation with some guarding, lower back tenderness to palpation. ED course:   Vitals:    01/21/20 1144   BP: (!) 120/92   Pulse: 123   Resp: 16   Temp: 98.1 °F (36.7 °C)   TempSrc: Oral   SpO2: 96%   Weight: 107 lb (48.5 kg)   Height: 5' 6\" (1.676 m)       ED Course as of Jan 21 1809 Tue Jan 21, 2020   1315 WBC(!): 19.3 []   7984 Spoke with Dr. Huy Thacker, OB/GYN, who will evaluate the patient. []      ED Course User Index  [] Mariposa Molina MD       US NON OB TRANSVAGINAL   Preliminary Result   1. Left-sided hydrosalpinx measuring up to 33 mm in diameter. This is new   compared to ultrasound 8/11/2019.   2. Right adnexal/ovarian 86 mm cystic lesion with internal debris, also new   from 8/11/2019. Gynecology evaluation recommended. MRI pelvis could also be considered. RECOMMENDATIONS:   Reference: Radiology 2010 Sep;256(3):943-54         CT ABDOMEN PELVIS W IV CONTRAST   Final Result   Dilated tubular structure in the left adnexal region with peripheral   enhancement. This structure measures slightly greater than simple fluid   density and could represent a hydrosalpinx or pyosalpinx. Additionally,   there is an approximately 4 cm cystic right adnexal lesion. Recommend   further evaluation with ultrasound. Small volume free fluid in the lower pelvis. The uterus has a lobular contour which may represent known uterine fibroids.            ED Course as of Jan 21 1810 Tue Jan 21, 2020

## 2020-01-21 NOTE — ED NOTES
Discussed with patient to place molina again. She states still having pain and would like to wait. Medicated patient with pain medication per MD order. Will continue to encourage and educate patient on molina placement.      Willard Sharp RN  01/21/20 0874

## 2020-01-22 LAB
ALBUMIN SERPL-MCNC: 3.4 GM/DL (ref 3.4–5)
ALP BLD-CCNC: 127 IU/L (ref 40–128)
ALT SERPL-CCNC: 19 U/L (ref 10–40)
ANION GAP SERPL CALCULATED.3IONS-SCNC: 13 MMOL/L (ref 4–16)
AST SERPL-CCNC: 19 IU/L (ref 15–37)
BILIRUB SERPL-MCNC: 0.5 MG/DL (ref 0–1)
BUN BLDV-MCNC: 4 MG/DL (ref 6–23)
CALCIUM SERPL-MCNC: 8.5 MG/DL (ref 8.3–10.6)
CHLORIDE BLD-SCNC: 100 MMOL/L (ref 99–110)
CO2: 24 MMOL/L (ref 21–32)
CREAT SERPL-MCNC: 0.6 MG/DL (ref 0.6–1.1)
GFR AFRICAN AMERICAN: >60 ML/MIN/1.73M2
GFR NON-AFRICAN AMERICAN: >60 ML/MIN/1.73M2
GLUCOSE BLD-MCNC: 106 MG/DL (ref 70–99)
HCT VFR BLD CALC: 35.6 % (ref 37–47)
HEMOGLOBIN: 11.3 GM/DL (ref 12.5–16)
MAGNESIUM: 1.8 MG/DL (ref 1.8–2.4)
MCH RBC QN AUTO: 31.6 PG (ref 27–31)
MCHC RBC AUTO-ENTMCNC: 31.7 % (ref 32–36)
MCV RBC AUTO: 99.4 FL (ref 78–100)
PDW BLD-RTO: 12.6 % (ref 11.7–14.9)
PLATELET # BLD: 207 K/CU MM (ref 140–440)
PMV BLD AUTO: 10 FL (ref 7.5–11.1)
POTASSIUM SERPL-SCNC: ABNORMAL MMOL/L (ref 3.5–5.1)
RBC # BLD: 3.58 M/CU MM (ref 4.2–5.4)
SODIUM BLD-SCNC: 137 MMOL/L (ref 135–145)
TOTAL PROTEIN: 6 GM/DL (ref 6.4–8.2)
WBC # BLD: 19.9 K/CU MM (ref 4–10.5)

## 2020-01-22 PROCEDURE — 94761 N-INVAS EAR/PLS OXIMETRY MLT: CPT

## 2020-01-22 PROCEDURE — 6360000002 HC RX W HCPCS: Performed by: FAMILY MEDICINE

## 2020-01-22 PROCEDURE — 6360000002 HC RX W HCPCS: Performed by: EMERGENCY MEDICINE

## 2020-01-22 PROCEDURE — 2580000003 HC RX 258: Performed by: FAMILY MEDICINE

## 2020-01-22 PROCEDURE — 83735 ASSAY OF MAGNESIUM: CPT

## 2020-01-22 PROCEDURE — 36415 COLL VENOUS BLD VENIPUNCTURE: CPT

## 2020-01-22 PROCEDURE — 80053 COMPREHEN METABOLIC PANEL: CPT

## 2020-01-22 PROCEDURE — 85025 COMPLETE CBC W/AUTO DIFF WBC: CPT

## 2020-01-22 PROCEDURE — 1200000000 HC SEMI PRIVATE

## 2020-01-22 PROCEDURE — 6370000000 HC RX 637 (ALT 250 FOR IP): Performed by: FAMILY MEDICINE

## 2020-01-22 PROCEDURE — 2580000003 HC RX 258: Performed by: OBSTETRICS & GYNECOLOGY

## 2020-01-22 PROCEDURE — 2500000003 HC RX 250 WO HCPCS: Performed by: OBSTETRICS & GYNECOLOGY

## 2020-01-22 PROCEDURE — 85027 COMPLETE CBC AUTOMATED: CPT

## 2020-01-22 RX ORDER — POTASSIUM CHLORIDE 29.8 MG/ML
20 INJECTION INTRAVENOUS ONCE
Status: COMPLETED | OUTPATIENT
Start: 2020-01-22 | End: 2020-01-22

## 2020-01-22 RX ORDER — POTASSIUM CHLORIDE 20 MEQ/1
40 TABLET, EXTENDED RELEASE ORAL ONCE
Status: COMPLETED | OUTPATIENT
Start: 2020-01-22 | End: 2020-01-22

## 2020-01-22 RX ORDER — LANOLIN ALCOHOL/MO/W.PET/CERES
400 CREAM (GRAM) TOPICAL 3 TIMES DAILY
Status: DISCONTINUED | OUTPATIENT
Start: 2020-01-22 | End: 2020-01-23

## 2020-01-22 RX ADMIN — VANCOMYCIN HYDROCHLORIDE 1000 MG: 1 INJECTION, SOLUTION INTRAVENOUS at 01:33

## 2020-01-22 RX ADMIN — POTASSIUM CHLORIDE 40 MEQ: 1500 TABLET, EXTENDED RELEASE ORAL at 17:57

## 2020-01-22 RX ADMIN — HYDROMORPHONE HYDROCHLORIDE 0.5 MG: 2 INJECTION, SOLUTION INTRAMUSCULAR; INTRAVENOUS; SUBCUTANEOUS at 18:15

## 2020-01-22 RX ADMIN — Medication 400 MG: at 17:57

## 2020-01-22 RX ADMIN — VANCOMYCIN HYDROCHLORIDE 1000 MG: 1 INJECTION, SOLUTION INTRAVENOUS at 14:08

## 2020-01-22 RX ADMIN — POTASSIUM CHLORIDE 20 MEQ: 400 INJECTION, SOLUTION INTRAVENOUS at 18:00

## 2020-01-22 RX ADMIN — HYDROMORPHONE HYDROCHLORIDE 0.5 MG: 2 INJECTION, SOLUTION INTRAMUSCULAR; INTRAVENOUS; SUBCUTANEOUS at 23:26

## 2020-01-22 RX ADMIN — ONDANSETRON 4 MG: 2 INJECTION INTRAMUSCULAR; INTRAVENOUS at 18:16

## 2020-01-22 RX ADMIN — HYDROMORPHONE HYDROCHLORIDE 0.5 MG: 2 INJECTION, SOLUTION INTRAMUSCULAR; INTRAVENOUS; SUBCUTANEOUS at 16:00

## 2020-01-22 RX ADMIN — HYDROMORPHONE HYDROCHLORIDE 0.5 MG: 2 INJECTION, SOLUTION INTRAMUSCULAR; INTRAVENOUS; SUBCUTANEOUS at 06:38

## 2020-01-22 RX ADMIN — SODIUM CHLORIDE, PRESERVATIVE FREE 10 ML: 5 INJECTION INTRAVENOUS at 00:31

## 2020-01-22 RX ADMIN — HYDROMORPHONE HYDROCHLORIDE 0.5 MG: 2 INJECTION, SOLUTION INTRAMUSCULAR; INTRAVENOUS; SUBCUTANEOUS at 09:02

## 2020-01-22 RX ADMIN — MEROPENEM 2 G: 1 INJECTION, POWDER, FOR SOLUTION INTRAVENOUS at 11:16

## 2020-01-22 RX ADMIN — SODIUM CHLORIDE, PRESERVATIVE FREE 10 ML: 5 INJECTION INTRAVENOUS at 10:14

## 2020-01-22 RX ADMIN — SODIUM CHLORIDE: 9 INJECTION, SOLUTION INTRAVENOUS at 11:10

## 2020-01-22 RX ADMIN — DOXYCYCLINE 100 MG: 100 INJECTION, POWDER, LYOPHILIZED, FOR SOLUTION INTRAVENOUS at 08:28

## 2020-01-22 RX ADMIN — HYDROMORPHONE HYDROCHLORIDE 0.5 MG: 2 INJECTION, SOLUTION INTRAMUSCULAR; INTRAVENOUS; SUBCUTANEOUS at 13:13

## 2020-01-22 RX ADMIN — HYDROMORPHONE HYDROCHLORIDE 0.5 MG: 2 INJECTION, SOLUTION INTRAMUSCULAR; INTRAVENOUS; SUBCUTANEOUS at 03:23

## 2020-01-22 RX ADMIN — MEROPENEM 2 G: 1 INJECTION, POWDER, FOR SOLUTION INTRAVENOUS at 17:57

## 2020-01-22 RX ADMIN — LORAZEPAM 0.5 MG: 0.5 TABLET ORAL at 14:11

## 2020-01-22 RX ADMIN — Medication 400 MG: at 21:11

## 2020-01-22 RX ADMIN — HYDROMORPHONE HYDROCHLORIDE 0.5 MG: 2 INJECTION, SOLUTION INTRAMUSCULAR; INTRAVENOUS; SUBCUTANEOUS at 11:10

## 2020-01-22 RX ADMIN — MEROPENEM 2 G: 1 INJECTION, POWDER, FOR SOLUTION INTRAVENOUS at 00:31

## 2020-01-22 RX ADMIN — DOXYCYCLINE 100 MG: 100 INJECTION, POWDER, LYOPHILIZED, FOR SOLUTION INTRAVENOUS at 21:11

## 2020-01-22 ASSESSMENT — PAIN SCALES - GENERAL
PAINLEVEL_OUTOF10: 5
PAINLEVEL_OUTOF10: 6
PAINLEVEL_OUTOF10: 4
PAINLEVEL_OUTOF10: 9
PAINLEVEL_OUTOF10: 9
PAINLEVEL_OUTOF10: 10
PAINLEVEL_OUTOF10: 9
PAINLEVEL_OUTOF10: 6
PAINLEVEL_OUTOF10: 10
PAINLEVEL_OUTOF10: 8
PAINLEVEL_OUTOF10: 2
PAINLEVEL_OUTOF10: 9
PAINLEVEL_OUTOF10: 8
PAINLEVEL_OUTOF10: 4

## 2020-01-22 ASSESSMENT — PAIN DESCRIPTION - PROGRESSION: CLINICAL_PROGRESSION: NOT CHANGED

## 2020-01-22 ASSESSMENT — PAIN DESCRIPTION - ORIENTATION: ORIENTATION: MID;RIGHT

## 2020-01-22 ASSESSMENT — PAIN DESCRIPTION - LOCATION: LOCATION: BACK

## 2020-01-22 ASSESSMENT — PAIN DESCRIPTION - PAIN TYPE: TYPE: ACUTE PAIN

## 2020-01-22 ASSESSMENT — PAIN DESCRIPTION - ONSET: ONSET: GRADUAL

## 2020-01-22 ASSESSMENT — PAIN DESCRIPTION - DESCRIPTORS: DESCRIPTORS: ACHING

## 2020-01-22 ASSESSMENT — PAIN DESCRIPTION - FREQUENCY: FREQUENCY: INTERMITTENT

## 2020-01-22 ASSESSMENT — PAIN - FUNCTIONAL ASSESSMENT: PAIN_FUNCTIONAL_ASSESSMENT: ACTIVITIES ARE NOT PREVENTED

## 2020-01-22 NOTE — PROGRESS NOTES
[address] [facility]  consulted by Dr. Hudson Sullivan for monitoring and adjustment. Indication for Treatment: Sepsis / Pelvic mass  Other Antimicrobials:  Merrem, Doxycycline   Goal Trough: 15 mcg/ml    AGE = 40 y.o.     SEX = female  HEIGHT = 5' 6\" (1.676 m)  Wt Readings from Last 3 Encounters:   01/21/20 107 lb (48.5 kg)   09/11/19 105 lb (47.6 kg)   08/11/19 108 lb (49 kg)     Estimated Creatinine Clearance: 69 mL/min (based on SCr of 0.8 mg/dL). Labs:  Recent Labs     01/21/20  1153   WBC 19.3*   BUN 6   CREATININE 0.8     WBC   Date Value Ref Range Status   01/21/2020 19.3 (H) 4.0 - 10.5 K/CU MM Final     Temp Readings from Last 3 Encounters:   01/21/20 97.9 °F (36.6 °C) (Oral)   09/11/19 99.1 °F (37.3 °C) (Oral)   08/11/19 98.3 °F (36.8 °C) (Oral)       Intake/Output Summary (Last 24 hours) at 1/22/2020 0324  Last data filed at 1/22/2020 0105  Gross per 24 hour   Intake 60 ml   Output 200 ml   Net -140 ml       Assessment:  Significantly elevated WBC (19.3); Fever reported prior to admission  SCr, BUN: WNL  Cultures: Blood (Pending), Urine (Pending)     Plan:  Vancomycin 1,000 mg IV Q 12 Hours   Check Vancomycin trough prior to fourth dose   Pharmacy will continue to monitor patient and adjust therapy as indicated    [facility] 01/23/2020 @ 13:30    Thank you for the consult.   Traci Becker [facility]  1/22/2020   3:24 AM  _______________________________________

## 2020-01-22 NOTE — PROGRESS NOTES
Admit Date: 1/21/2020  Hospital day 2    Subjective:       Patient reports significant pain. NO appetite but no emesis. Is passing gas. Is tolerating small amounts of po    Scheduled Meds:   magnesium oxide  400 mg Oral TID    doxycycline (VIBRAMYCIN) IV  100 mg Intravenous Q12H    QUEtiapine  100 mg Oral Nightly    multivitamin  1 tablet Oral Daily    rOPINIRole  1 mg Oral Nightly    fluticasone  1 spray Nasal Daily    sodium chloride flush  10 mL Intravenous BID    enoxaparin  40 mg Subcutaneous Daily    meropenem  2 g Intravenous Q8H    vancomycin  1,000 mg Intravenous Q12H     Continuous Infusions:   sodium chloride 100 mL/hr at 01/22/20 1110     PRN Meds:HYDROmorphone, ondansetron, prochlorperazine, diphenhydrAMINE, albuterol sulfate HFA, oxyCODONE-acetaminophen, LORazepam, sodium chloride flush, magnesium hydroxide, ondansetron        Objective:     Patient Vitals for the past 8 hrs:   BP Temp Temp src Pulse Resp SpO2   01/22/20 1701 (!) 126/95 98 °F (36.7 °C) Oral 96 14 94 %     I/O last 3 completed shifts: In: 7294 [P.O.:240; I.V.:616; IV Piggyback:350]  Out: 400 [Urine:400]  No intake/output data recorded. Head: Normocephalic, without obvious abnormality, atraumatic  Lungs: clear to auscultation bilaterally  Heart: regular rate and rhythm  Abdomen: soft, significant tenderness without rebound or guarding, no masses  Extremities: extremities normal, atraumatic, no cyanosis or edema    Data Review       01/22/2020 1320 01/22/2020 1345 CBC [[id number]]   (Abnormal)   Blood    WBC 19. 9High  K/CU MM   RBC 3.58Low  M/CU MM   Hemoglobin Quant 11.3Low  GM/DL   Hematocrit 35.6Low  %   MCV 99.4 FL   MCH 31.6High  PG   MCHC 31.7Low  %   RDW 12.6 %   Platelet Count 667 K/CU MM   MPV 10.0 [facility]           01/22/2020 1320 01/22/2020 1510 Comprehensive Metabolic Panel w/ Reflex to MG [[id number]]   (Abnormal)   Blood    Sodium 137 MMOL/L   Potassium 3.0   K CALLED TO ANTON OTOOLE RN ON [id number] AT 1426 BY HTOWNSEND MT   RESULTS READ BACK   Low Panic  MMOL/L   Chloride 100 mMol/L   CO2 24 MMOL/L   BUN 4Low  MG/DL   Creatinine 0.6 MG/DL   Glucose 106High  MG/DL   Calcium 8.5 MG/DL   Albumin 3.4 GM/DL   Total Protein 6.0Low  GM/DL   Bilirubin 0.5 MG/DL   ALT 19 U/L   AST 19 IU/L   Alk Phos 127 IU/L   GFR Non-African American >60 mL/min/1.73m2   GFR African American >60 mL/min/1.73m2   Anion Gap 13                  Assessment/Plan:     1. PID with Tuboovarian abscess  -continue broad spectrum antibiotics  -CBC in AM  -if no significant decrease in WBC count, will have interventional radiology evaluate for CT guided drainage.       2.  Hypokalemia, s/p replacement by Dr. Camilla Rolle 1/22/2020 6:34 PM

## 2020-01-22 NOTE — ED NOTES
Pt has voided 3 times since 2000 and does not feel the need for a molina cath at this time.       Ramone Perea RN  01/21/20 3046

## 2020-01-23 ENCOUNTER — ANESTHESIA EVENT (OUTPATIENT)
Dept: OPERATING ROOM | Age: 45
DRG: 513 | End: 2020-01-23
Payer: COMMERCIAL

## 2020-01-23 ENCOUNTER — APPOINTMENT (OUTPATIENT)
Dept: ULTRASOUND IMAGING | Age: 45
DRG: 513 | End: 2020-01-23
Payer: COMMERCIAL

## 2020-01-23 ENCOUNTER — ANESTHESIA (OUTPATIENT)
Dept: OPERATING ROOM | Age: 45
DRG: 513 | End: 2020-01-23
Payer: COMMERCIAL

## 2020-01-23 VITALS
OXYGEN SATURATION: 100 % | SYSTOLIC BLOOD PRESSURE: 148 MMHG | RESPIRATION RATE: 15 BRPM | DIASTOLIC BLOOD PRESSURE: 109 MMHG | TEMPERATURE: 99.4 F

## 2020-01-23 LAB
ABO/RH: NORMAL
ANION GAP SERPL CALCULATED.3IONS-SCNC: 11 MMOL/L (ref 4–16)
ANTIBODY SCREEN: NEGATIVE
APTT: 43.2 SECONDS (ref 25.1–37.1)
BASOPHILS ABSOLUTE: 0 K/CU MM
BASOPHILS RELATIVE PERCENT: 0.2 % (ref 0–1)
BUN BLDV-MCNC: 3 MG/DL (ref 6–23)
CALCIUM SERPL-MCNC: 8.9 MG/DL (ref 8.3–10.6)
CHLORIDE BLD-SCNC: 100 MMOL/L (ref 99–110)
CO2: 30 MMOL/L (ref 21–32)
CREAT SERPL-MCNC: 0.6 MG/DL (ref 0.6–1.1)
CULTURE: NORMAL
DIFFERENTIAL TYPE: ABNORMAL
DOSE AMOUNT: ABNORMAL
DOSE TIME: ABNORMAL
EOSINOPHILS ABSOLUTE: 0 K/CU MM
EOSINOPHILS RELATIVE PERCENT: 0.2 % (ref 0–3)
GFR AFRICAN AMERICAN: >60 ML/MIN/1.73M2
GFR NON-AFRICAN AMERICAN: >60 ML/MIN/1.73M2
GLUCOSE BLD-MCNC: 109 MG/DL (ref 70–99)
HCT VFR BLD CALC: 32.6 % (ref 37–47)
HCT VFR BLD CALC: 32.7 % (ref 37–47)
HCT VFR BLD CALC: 37.3 % (ref 37–47)
HEMOGLOBIN: 10 GM/DL (ref 12.5–16)
HEMOGLOBIN: 11.9 GM/DL (ref 12.5–16)
HEMOGLOBIN: 9.8 GM/DL (ref 12.5–16)
IMMATURE NEUTROPHIL %: 0.9 % (ref 0–0.43)
INR BLD: 0.98 INDEX
LACTATE: 1.3 MMOL/L (ref 0.4–2)
LYMPHOCYTES ABSOLUTE: 0.6 K/CU MM
LYMPHOCYTES RELATIVE PERCENT: 3.4 % (ref 24–44)
Lab: NORMAL
MCH RBC QN AUTO: 31.7 PG (ref 27–31)
MCHC RBC AUTO-ENTMCNC: 31.9 % (ref 32–36)
MCV RBC AUTO: 99.5 FL (ref 78–100)
MONOCYTES ABSOLUTE: 1.2 K/CU MM
MONOCYTES RELATIVE PERCENT: 6.8 % (ref 0–4)
PDW BLD-RTO: 12.8 % (ref 11.7–14.9)
PLATELET # BLD: 228 K/CU MM (ref 140–440)
PMV BLD AUTO: 10 FL (ref 7.5–11.1)
POTASSIUM SERPL-SCNC: 3.4 MMOL/L (ref 3.5–5.1)
PROTHROMBIN TIME: 11.8 SECONDS (ref 11.7–14.5)
RBC # BLD: 3.75 M/CU MM (ref 4.2–5.4)
SEGMENTED NEUTROPHILS ABSOLUTE COUNT: 15.7 K/CU MM
SEGMENTED NEUTROPHILS RELATIVE PERCENT: 88.5 % (ref 36–66)
SODIUM BLD-SCNC: 141 MMOL/L (ref 135–145)
SPECIMEN: NORMAL
TOTAL IMMATURE NEUTOROPHIL: 0.16 K/CU MM
VANCOMYCIN TROUGH: 8.3 UG/ML (ref 10–20)
WBC # BLD: 17.7 K/CU MM (ref 4–10.5)

## 2020-01-23 PROCEDURE — 83605 ASSAY OF LACTIC ACID: CPT

## 2020-01-23 PROCEDURE — 2580000003 HC RX 258: Performed by: OBSTETRICS & GYNECOLOGY

## 2020-01-23 PROCEDURE — 2580000003 HC RX 258: Performed by: NURSE ANESTHETIST, CERTIFIED REGISTERED

## 2020-01-23 PROCEDURE — 6360000002 HC RX W HCPCS: Performed by: OBSTETRICS & GYNECOLOGY

## 2020-01-23 PROCEDURE — 2580000003 HC RX 258: Performed by: FAMILY MEDICINE

## 2020-01-23 PROCEDURE — 86850 RBC ANTIBODY SCREEN: CPT

## 2020-01-23 PROCEDURE — 6360000002 HC RX W HCPCS: Performed by: FAMILY MEDICINE

## 2020-01-23 PROCEDURE — 86900 BLOOD TYPING SEROLOGIC ABO: CPT

## 2020-01-23 PROCEDURE — 6370000000 HC RX 637 (ALT 250 FOR IP)

## 2020-01-23 PROCEDURE — 7100000000 HC PACU RECOVERY - FIRST 15 MIN: Performed by: OBSTETRICS & GYNECOLOGY

## 2020-01-23 PROCEDURE — P9045 ALBUMIN (HUMAN), 5%, 250 ML: HCPCS | Performed by: NURSE ANESTHETIST, CERTIFIED REGISTERED

## 2020-01-23 PROCEDURE — 88305 TISSUE EXAM BY PATHOLOGIST: CPT

## 2020-01-23 PROCEDURE — 85610 PROTHROMBIN TIME: CPT

## 2020-01-23 PROCEDURE — 6360000002 HC RX W HCPCS: Performed by: NURSE ANESTHETIST, CERTIFIED REGISTERED

## 2020-01-23 PROCEDURE — 2500000003 HC RX 250 WO HCPCS: Performed by: NURSE ANESTHETIST, CERTIFIED REGISTERED

## 2020-01-23 PROCEDURE — 94761 N-INVAS EAR/PLS OXIMETRY MLT: CPT

## 2020-01-23 PROCEDURE — 6360000002 HC RX W HCPCS: Performed by: EMERGENCY MEDICINE

## 2020-01-23 PROCEDURE — 85730 THROMBOPLASTIN TIME PARTIAL: CPT

## 2020-01-23 PROCEDURE — 3600000013 HC SURGERY LEVEL 3 ADDTL 15MIN: Performed by: OBSTETRICS & GYNECOLOGY

## 2020-01-23 PROCEDURE — 3700000000 HC ANESTHESIA ATTENDED CARE: Performed by: OBSTETRICS & GYNECOLOGY

## 2020-01-23 PROCEDURE — 1200000000 HC SEMI PRIVATE

## 2020-01-23 PROCEDURE — 87205 SMEAR GRAM STAIN: CPT

## 2020-01-23 PROCEDURE — 87073 CULTURE BACTERIA ANAEROBIC: CPT

## 2020-01-23 PROCEDURE — 87071 CULTURE AEROBIC QUANT OTHER: CPT

## 2020-01-23 PROCEDURE — 3700000001 HC ADD 15 MINUTES (ANESTHESIA): Performed by: OBSTETRICS & GYNECOLOGY

## 2020-01-23 PROCEDURE — 6370000000 HC RX 637 (ALT 250 FOR IP): Performed by: FAMILY MEDICINE

## 2020-01-23 PROCEDURE — 76857 US EXAM PELVIC LIMITED: CPT

## 2020-01-23 PROCEDURE — 6370000000 HC RX 637 (ALT 250 FOR IP): Performed by: OBSTETRICS & GYNECOLOGY

## 2020-01-23 PROCEDURE — 6360000002 HC RX W HCPCS: Performed by: ANESTHESIOLOGY

## 2020-01-23 PROCEDURE — 86901 BLOOD TYPING SEROLOGIC RH(D): CPT

## 2020-01-23 PROCEDURE — 80048 BASIC METABOLIC PNL TOTAL CA: CPT

## 2020-01-23 PROCEDURE — 7100000001 HC PACU RECOVERY - ADDTL 15 MIN: Performed by: OBSTETRICS & GYNECOLOGY

## 2020-01-23 PROCEDURE — 2500000003 HC RX 250 WO HCPCS: Performed by: OBSTETRICS & GYNECOLOGY

## 2020-01-23 PROCEDURE — 85025 COMPLETE CBC W/AUTO DIFF WBC: CPT

## 2020-01-23 PROCEDURE — 2709999900 HC NON-CHARGEABLE SUPPLY: Performed by: OBSTETRICS & GYNECOLOGY

## 2020-01-23 PROCEDURE — 36415 COLL VENOUS BLD VENIPUNCTURE: CPT

## 2020-01-23 PROCEDURE — 80202 ASSAY OF VANCOMYCIN: CPT

## 2020-01-23 PROCEDURE — 3600000003 HC SURGERY LEVEL 3 BASE: Performed by: OBSTETRICS & GYNECOLOGY

## 2020-01-23 PROCEDURE — 85018 HEMOGLOBIN: CPT

## 2020-01-23 PROCEDURE — 85014 HEMATOCRIT: CPT

## 2020-01-23 RX ORDER — GLYCOPYRROLATE 1 MG/5 ML
SYRINGE (ML) INTRAVENOUS PRN
Status: DISCONTINUED | OUTPATIENT
Start: 2020-01-23 | End: 2020-01-23 | Stop reason: SDUPTHER

## 2020-01-23 RX ORDER — ROCURONIUM BROMIDE 10 MG/ML
INJECTION, SOLUTION INTRAVENOUS PRN
Status: DISCONTINUED | OUTPATIENT
Start: 2020-01-23 | End: 2020-01-23 | Stop reason: SDUPTHER

## 2020-01-23 RX ORDER — ONDANSETRON 2 MG/ML
4 INJECTION INTRAMUSCULAR; INTRAVENOUS
Status: DISCONTINUED | OUTPATIENT
Start: 2020-01-23 | End: 2020-01-23 | Stop reason: HOSPADM

## 2020-01-23 RX ORDER — SODIUM CHLORIDE 9 MG/ML
INJECTION, SOLUTION INTRAVENOUS CONTINUOUS PRN
Status: DISCONTINUED | OUTPATIENT
Start: 2020-01-23 | End: 2020-01-23 | Stop reason: SDUPTHER

## 2020-01-23 RX ORDER — PROPOFOL 10 MG/ML
INJECTION, EMULSION INTRAVENOUS PRN
Status: DISCONTINUED | OUTPATIENT
Start: 2020-01-23 | End: 2020-01-23 | Stop reason: SDUPTHER

## 2020-01-23 RX ORDER — FENTANYL CITRATE 50 UG/ML
50 INJECTION, SOLUTION INTRAMUSCULAR; INTRAVENOUS EVERY 5 MIN PRN
Status: DISCONTINUED | OUTPATIENT
Start: 2020-01-23 | End: 2020-01-23 | Stop reason: HOSPADM

## 2020-01-23 RX ORDER — ACETAMINOPHEN 325 MG/1
650 TABLET ORAL EVERY 4 HOURS PRN
Status: DISCONTINUED | OUTPATIENT
Start: 2020-01-23 | End: 2020-01-30 | Stop reason: HOSPADM

## 2020-01-23 RX ORDER — FENTANYL CITRATE 50 UG/ML
25 INJECTION, SOLUTION INTRAMUSCULAR; INTRAVENOUS EVERY 5 MIN PRN
Status: DISCONTINUED | OUTPATIENT
Start: 2020-01-23 | End: 2020-01-23 | Stop reason: HOSPADM

## 2020-01-23 RX ORDER — OXYCODONE HYDROCHLORIDE AND ACETAMINOPHEN 5; 325 MG/1; MG/1
2 TABLET ORAL EVERY 4 HOURS PRN
Status: DISCONTINUED | OUTPATIENT
Start: 2020-01-23 | End: 2020-01-30 | Stop reason: HOSPADM

## 2020-01-23 RX ORDER — LORAZEPAM 2 MG/ML
1 INJECTION INTRAMUSCULAR EVERY 6 HOURS PRN
Status: DISCONTINUED | OUTPATIENT
Start: 2020-01-23 | End: 2020-01-23

## 2020-01-23 RX ORDER — MIDAZOLAM HYDROCHLORIDE 1 MG/ML
INJECTION INTRAMUSCULAR; INTRAVENOUS PRN
Status: DISCONTINUED | OUTPATIENT
Start: 2020-01-23 | End: 2020-01-23 | Stop reason: SDUPTHER

## 2020-01-23 RX ORDER — HYDRALAZINE HYDROCHLORIDE 20 MG/ML
5 INJECTION INTRAMUSCULAR; INTRAVENOUS EVERY 10 MIN PRN
Status: DISCONTINUED | OUTPATIENT
Start: 2020-01-23 | End: 2020-01-23 | Stop reason: HOSPADM

## 2020-01-23 RX ORDER — BUPIVACAINE HYDROCHLORIDE 5 MG/ML
INJECTION, SOLUTION EPIDURAL; INTRACAUDAL
Status: COMPLETED | OUTPATIENT
Start: 2020-01-23 | End: 2020-01-23

## 2020-01-23 RX ORDER — ALBUMIN, HUMAN INJ 5% 5 %
SOLUTION INTRAVENOUS PRN
Status: DISCONTINUED | OUTPATIENT
Start: 2020-01-23 | End: 2020-01-23 | Stop reason: SDUPTHER

## 2020-01-23 RX ORDER — DEXAMETHASONE SODIUM PHOSPHATE 4 MG/ML
INJECTION, SOLUTION INTRA-ARTICULAR; INTRALESIONAL; INTRAMUSCULAR; INTRAVENOUS; SOFT TISSUE PRN
Status: DISCONTINUED | OUTPATIENT
Start: 2020-01-23 | End: 2020-01-23 | Stop reason: SDUPTHER

## 2020-01-23 RX ORDER — LABETALOL 20 MG/4 ML (5 MG/ML) INTRAVENOUS SYRINGE
5 EVERY 10 MIN PRN
Status: DISCONTINUED | OUTPATIENT
Start: 2020-01-23 | End: 2020-01-23 | Stop reason: HOSPADM

## 2020-01-23 RX ORDER — OXYCODONE HYDROCHLORIDE AND ACETAMINOPHEN 5; 325 MG/1; MG/1
1 TABLET ORAL EVERY 4 HOURS PRN
Status: DISCONTINUED | OUTPATIENT
Start: 2020-01-23 | End: 2020-01-30 | Stop reason: HOSPADM

## 2020-01-23 RX ORDER — DOCUSATE SODIUM 100 MG/1
100 CAPSULE, LIQUID FILLED ORAL 2 TIMES DAILY
Status: DISCONTINUED | OUTPATIENT
Start: 2020-01-23 | End: 2020-01-30 | Stop reason: HOSPADM

## 2020-01-23 RX ORDER — FENTANYL CITRATE 50 UG/ML
INJECTION, SOLUTION INTRAMUSCULAR; INTRAVENOUS PRN
Status: DISCONTINUED | OUTPATIENT
Start: 2020-01-23 | End: 2020-01-23 | Stop reason: SDUPTHER

## 2020-01-23 RX ORDER — ONDANSETRON 2 MG/ML
4 INJECTION INTRAMUSCULAR; INTRAVENOUS EVERY 6 HOURS PRN
Status: DISCONTINUED | OUTPATIENT
Start: 2020-01-23 | End: 2020-01-30 | Stop reason: HOSPADM

## 2020-01-23 RX ORDER — SODIUM CHLORIDE, SODIUM LACTATE, POTASSIUM CHLORIDE, CALCIUM CHLORIDE 600; 310; 30; 20 MG/100ML; MG/100ML; MG/100ML; MG/100ML
INJECTION, SOLUTION INTRAVENOUS CONTINUOUS PRN
Status: DISCONTINUED | OUTPATIENT
Start: 2020-01-23 | End: 2020-01-23 | Stop reason: SDUPTHER

## 2020-01-23 RX ORDER — ACETAMINOPHEN 10 MG/ML
INJECTION, SOLUTION INTRAVENOUS PRN
Status: DISCONTINUED | OUTPATIENT
Start: 2020-01-23 | End: 2020-01-23 | Stop reason: SDUPTHER

## 2020-01-23 RX ORDER — SODIUM CHLORIDE 0.9 % (FLUSH) 0.9 %
10 SYRINGE (ML) INJECTION EVERY 12 HOURS SCHEDULED
Status: DISCONTINUED | OUTPATIENT
Start: 2020-01-23 | End: 2020-01-30 | Stop reason: HOSPADM

## 2020-01-23 RX ORDER — SODIUM CHLORIDE 0.9 % (FLUSH) 0.9 %
10 SYRINGE (ML) INJECTION PRN
Status: DISCONTINUED | OUTPATIENT
Start: 2020-01-23 | End: 2020-01-30 | Stop reason: HOSPADM

## 2020-01-23 RX ORDER — DEXTROSE, SODIUM CHLORIDE, SODIUM LACTATE, POTASSIUM CHLORIDE, AND CALCIUM CHLORIDE 5; .6; .31; .03; .02 G/100ML; G/100ML; G/100ML; G/100ML; G/100ML
INJECTION, SOLUTION INTRAVENOUS CONTINUOUS
Status: DISCONTINUED | OUTPATIENT
Start: 2020-01-23 | End: 2020-01-25

## 2020-01-23 RX ORDER — ONDANSETRON 2 MG/ML
INJECTION INTRAMUSCULAR; INTRAVENOUS PRN
Status: DISCONTINUED | OUTPATIENT
Start: 2020-01-23 | End: 2020-01-23 | Stop reason: SDUPTHER

## 2020-01-23 RX ORDER — NEOSTIGMINE METHYLSULFATE 5 MG/5 ML
SYRINGE (ML) INTRAVENOUS PRN
Status: DISCONTINUED | OUTPATIENT
Start: 2020-01-23 | End: 2020-01-23 | Stop reason: SDUPTHER

## 2020-01-23 RX ORDER — LIDOCAINE HYDROCHLORIDE 20 MG/ML
INJECTION, SOLUTION INTRAVENOUS PRN
Status: DISCONTINUED | OUTPATIENT
Start: 2020-01-23 | End: 2020-01-23 | Stop reason: SDUPTHER

## 2020-01-23 RX ADMIN — ACETAMINOPHEN 350 MG: 10 INJECTION, SOLUTION INTRAVENOUS at 18:43

## 2020-01-23 RX ADMIN — HYDROMORPHONE HYDROCHLORIDE 0.5 MG: 2 INJECTION, SOLUTION INTRAMUSCULAR; INTRAVENOUS; SUBCUTANEOUS at 02:13

## 2020-01-23 RX ADMIN — SODIUM CHLORIDE: 9 INJECTION, SOLUTION INTRAVENOUS at 15:17

## 2020-01-23 RX ADMIN — DOXYCYCLINE 100 MG: 100 INJECTION, POWDER, LYOPHILIZED, FOR SOLUTION INTRAVENOUS at 09:49

## 2020-01-23 RX ADMIN — FENTANYL CITRATE 50 MCG: 50 INJECTION INTRAMUSCULAR; INTRAVENOUS at 17:49

## 2020-01-23 RX ADMIN — FENTANYL CITRATE 25 MCG: 50 INJECTION INTRAMUSCULAR; INTRAVENOUS at 19:30

## 2020-01-23 RX ADMIN — ONDANSETRON 4 MG: 2 INJECTION INTRAMUSCULAR; INTRAVENOUS at 16:47

## 2020-01-23 RX ADMIN — VANCOMYCIN HYDROCHLORIDE 1000 MG: 1 INJECTION, SOLUTION INTRAVENOUS at 01:54

## 2020-01-23 RX ADMIN — Medication 400 MG: at 21:02

## 2020-01-23 RX ADMIN — HYDROMORPHONE HYDROCHLORIDE 0.5 MG: 2 INJECTION, SOLUTION INTRAMUSCULAR; INTRAVENOUS; SUBCUTANEOUS at 20:32

## 2020-01-23 RX ADMIN — FENTANYL CITRATE 50 MCG: 50 INJECTION INTRAMUSCULAR; INTRAVENOUS at 16:57

## 2020-01-23 RX ADMIN — ROPINIROLE HYDROCHLORIDE 1 MG: 1 TABLET, FILM COATED ORAL at 21:02

## 2020-01-23 RX ADMIN — DEXAMETHASONE SODIUM PHOSPHATE 8 MG: 4 INJECTION, SOLUTION INTRAMUSCULAR; INTRAVENOUS at 16:46

## 2020-01-23 RX ADMIN — Medication 0.6 MG: at 19:11

## 2020-01-23 RX ADMIN — PROPOFOL 100 MG: 10 INJECTION, EMULSION INTRAVENOUS at 16:39

## 2020-01-23 RX ADMIN — LIDOCAINE HYDROCHLORIDE 100 MG: 20 INJECTION, SOLUTION INTRAVENOUS at 16:39

## 2020-01-23 RX ADMIN — OXYCODONE HYDROCHLORIDE AND ACETAMINOPHEN 2 TABLET: 5; 325 TABLET ORAL at 22:30

## 2020-01-23 RX ADMIN — HYDROMORPHONE HYDROCHLORIDE 0.5 MG: 2 INJECTION, SOLUTION INTRAMUSCULAR; INTRAVENOUS; SUBCUTANEOUS at 06:58

## 2020-01-23 RX ADMIN — MEROPENEM 2 G: 1 INJECTION, POWDER, FOR SOLUTION INTRAVENOUS at 01:02

## 2020-01-23 RX ADMIN — MIDAZOLAM 2 MG: 1 INJECTION INTRAMUSCULAR; INTRAVENOUS at 16:39

## 2020-01-23 RX ADMIN — FENTANYL CITRATE 50 MCG: 50 INJECTION INTRAMUSCULAR; INTRAVENOUS at 18:34

## 2020-01-23 RX ADMIN — DOXYCYCLINE 100 MG: 100 INJECTION, POWDER, LYOPHILIZED, FOR SOLUTION INTRAVENOUS at 21:02

## 2020-01-23 RX ADMIN — PROPOFOL 50 MG: 10 INJECTION, EMULSION INTRAVENOUS at 16:57

## 2020-01-23 RX ADMIN — SODIUM CHLORIDE: 900 INJECTION INTRAVENOUS at 15:01

## 2020-01-23 RX ADMIN — ALBUMIN (HUMAN) 12.5 G: 12.5 INJECTION, SOLUTION INTRAVENOUS at 18:06

## 2020-01-23 RX ADMIN — DOCUSATE SODIUM 100 MG: 100 CAPSULE, LIQUID FILLED ORAL at 23:38

## 2020-01-23 RX ADMIN — SODIUM CHLORIDE: 9 INJECTION, SOLUTION INTRAVENOUS at 02:14

## 2020-01-23 RX ADMIN — Medication 3 MG: at 19:11

## 2020-01-23 RX ADMIN — HYDROMORPHONE HYDROCHLORIDE 1 MG: 1 INJECTION, SOLUTION INTRAMUSCULAR; INTRAVENOUS; SUBCUTANEOUS at 23:44

## 2020-01-23 RX ADMIN — FENTANYL CITRATE 100 MCG: 50 INJECTION INTRAMUSCULAR; INTRAVENOUS at 16:39

## 2020-01-23 RX ADMIN — FENTANYL CITRATE 50 MCG: 50 INJECTION INTRAMUSCULAR; INTRAVENOUS at 17:21

## 2020-01-23 RX ADMIN — QUETIAPINE FUMARATE 100 MG: 100 TABLET ORAL at 21:02

## 2020-01-23 RX ADMIN — VANCOMYCIN HYDROCHLORIDE 1000 MG: 1 INJECTION, SOLUTION INTRAVENOUS at 16:42

## 2020-01-23 RX ADMIN — MEROPENEM 2 G: 1 INJECTION, POWDER, FOR SOLUTION INTRAVENOUS at 12:24

## 2020-01-23 RX ADMIN — SODIUM CHLORIDE, SODIUM LACTATE, POTASSIUM CHLORIDE, CALCIUM CHLORIDE AND DEXTROSE MONOHYDRATE: 5; 600; 310; 30; 20 INJECTION, SOLUTION INTRAVENOUS at 19:40

## 2020-01-23 RX ADMIN — ENOXAPARIN SODIUM 40 MG: 40 INJECTION SUBCUTANEOUS at 09:50

## 2020-01-23 RX ADMIN — ONDANSETRON 2 MG: 2 INJECTION INTRAMUSCULAR; INTRAVENOUS at 19:08

## 2020-01-23 RX ADMIN — FENTANYL CITRATE 25 MCG: 50 INJECTION INTRAMUSCULAR; INTRAVENOUS at 19:39

## 2020-01-23 RX ADMIN — LORAZEPAM 1 MG: 2 INJECTION INTRAMUSCULAR; INTRAVENOUS at 09:00

## 2020-01-23 RX ADMIN — SODIUM CHLORIDE, PRESERVATIVE FREE 10 ML: 5 INJECTION INTRAVENOUS at 21:03

## 2020-01-23 RX ADMIN — SODIUM CHLORIDE, PRESERVATIVE FREE 10 ML: 5 INJECTION INTRAVENOUS at 09:51

## 2020-01-23 RX ADMIN — ROCURONIUM BROMIDE 10 MG: 10 INJECTION INTRAVENOUS at 17:40

## 2020-01-23 RX ADMIN — MEROPENEM 2 G: 1 INJECTION, POWDER, FOR SOLUTION INTRAVENOUS at 18:47

## 2020-01-23 RX ADMIN — SODIUM CHLORIDE, POTASSIUM CHLORIDE, SODIUM LACTATE AND CALCIUM CHLORIDE: 600; 310; 30; 20 INJECTION, SOLUTION INTRAVENOUS at 18:06

## 2020-01-23 RX ADMIN — SODIUM CHLORIDE, PRESERVATIVE FREE 10 ML: 5 INJECTION INTRAVENOUS at 23:45

## 2020-01-23 RX ADMIN — ONDANSETRON 4 MG: 2 INJECTION INTRAMUSCULAR; INTRAVENOUS at 07:02

## 2020-01-23 ASSESSMENT — PAIN DESCRIPTION - DESCRIPTORS
DESCRIPTORS: ACHING
DESCRIPTORS: STABBING;CRAMPING
DESCRIPTORS: ACHING
DESCRIPTORS: ACHING
DESCRIPTORS: CONSTANT

## 2020-01-23 ASSESSMENT — PULMONARY FUNCTION TESTS
PIF_VALUE: 18
PIF_VALUE: 23
PIF_VALUE: 25
PIF_VALUE: 3
PIF_VALUE: 15
PIF_VALUE: 18
PIF_VALUE: 0
PIF_VALUE: 25
PIF_VALUE: 24
PIF_VALUE: 19
PIF_VALUE: 19
PIF_VALUE: 17
PIF_VALUE: 15
PIF_VALUE: 18
PIF_VALUE: 25
PIF_VALUE: 16
PIF_VALUE: 18
PIF_VALUE: 25
PIF_VALUE: 1
PIF_VALUE: 18
PIF_VALUE: 25
PIF_VALUE: 18
PIF_VALUE: 25
PIF_VALUE: 24
PIF_VALUE: 20
PIF_VALUE: 24
PIF_VALUE: 24
PIF_VALUE: 23
PIF_VALUE: 25
PIF_VALUE: 25
PIF_VALUE: 19
PIF_VALUE: 4
PIF_VALUE: 23
PIF_VALUE: 18
PIF_VALUE: 21
PIF_VALUE: 3
PIF_VALUE: 23
PIF_VALUE: 19
PIF_VALUE: 4
PIF_VALUE: 21
PIF_VALUE: 4
PIF_VALUE: 24
PIF_VALUE: 24
PIF_VALUE: 17
PIF_VALUE: 1
PIF_VALUE: 2
PIF_VALUE: 20
PIF_VALUE: 16
PIF_VALUE: 25
PIF_VALUE: 13
PIF_VALUE: 1
PIF_VALUE: 25
PIF_VALUE: 25
PIF_VALUE: 18
PIF_VALUE: 17
PIF_VALUE: 19
PIF_VALUE: 18
PIF_VALUE: 16
PIF_VALUE: 13
PIF_VALUE: 24
PIF_VALUE: 25
PIF_VALUE: 25
PIF_VALUE: 20
PIF_VALUE: 4
PIF_VALUE: 18
PIF_VALUE: 15
PIF_VALUE: 4
PIF_VALUE: 5
PIF_VALUE: 20
PIF_VALUE: 19
PIF_VALUE: 17
PIF_VALUE: 23
PIF_VALUE: 17
PIF_VALUE: 24
PIF_VALUE: 19
PIF_VALUE: 17
PIF_VALUE: 18
PIF_VALUE: 6
PIF_VALUE: 24
PIF_VALUE: 18
PIF_VALUE: 19
PIF_VALUE: 16
PIF_VALUE: 24
PIF_VALUE: 3
PIF_VALUE: 17
PIF_VALUE: 18
PIF_VALUE: 23
PIF_VALUE: 19
PIF_VALUE: 19
PIF_VALUE: 17
PIF_VALUE: 18
PIF_VALUE: 19
PIF_VALUE: 19
PIF_VALUE: 0
PIF_VALUE: 17
PIF_VALUE: 18
PIF_VALUE: 1
PIF_VALUE: 23
PIF_VALUE: 24
PIF_VALUE: 17
PIF_VALUE: 20
PIF_VALUE: 23
PIF_VALUE: 17
PIF_VALUE: 19
PIF_VALUE: 17
PIF_VALUE: 18
PIF_VALUE: 18
PIF_VALUE: 3
PIF_VALUE: 16
PIF_VALUE: 19
PIF_VALUE: 25
PIF_VALUE: 23
PIF_VALUE: 18
PIF_VALUE: 15
PIF_VALUE: 20
PIF_VALUE: 16
PIF_VALUE: 24
PIF_VALUE: 15
PIF_VALUE: 24
PIF_VALUE: 20
PIF_VALUE: 23
PIF_VALUE: 24
PIF_VALUE: 23
PIF_VALUE: 19
PIF_VALUE: 24
PIF_VALUE: 1
PIF_VALUE: 18
PIF_VALUE: 17
PIF_VALUE: 17
PIF_VALUE: 0
PIF_VALUE: 18
PIF_VALUE: 16
PIF_VALUE: 1
PIF_VALUE: 17
PIF_VALUE: 16
PIF_VALUE: 24
PIF_VALUE: 19
PIF_VALUE: 18
PIF_VALUE: 22
PIF_VALUE: 15
PIF_VALUE: 2
PIF_VALUE: 24
PIF_VALUE: 25
PIF_VALUE: 19
PIF_VALUE: 15
PIF_VALUE: 16
PIF_VALUE: 1
PIF_VALUE: 5
PIF_VALUE: 17
PIF_VALUE: 23
PIF_VALUE: 17
PIF_VALUE: 17
PIF_VALUE: 19
PIF_VALUE: 24
PIF_VALUE: 8
PIF_VALUE: 17
PIF_VALUE: 23
PIF_VALUE: 18
PIF_VALUE: 25
PIF_VALUE: 19
PIF_VALUE: 2
PIF_VALUE: 17
PIF_VALUE: 18
PIF_VALUE: 17
PIF_VALUE: 25
PIF_VALUE: 24
PIF_VALUE: 24
PIF_VALUE: 4
PIF_VALUE: 16
PIF_VALUE: 18
PIF_VALUE: 17
PIF_VALUE: 17
PIF_VALUE: 18
PIF_VALUE: 24
PIF_VALUE: 24
PIF_VALUE: 17
PIF_VALUE: 24
PIF_VALUE: 16
PIF_VALUE: 25

## 2020-01-23 ASSESSMENT — PAIN SCALES - GENERAL
PAINLEVEL_OUTOF10: 6
PAINLEVEL_OUTOF10: 0
PAINLEVEL_OUTOF10: 4
PAINLEVEL_OUTOF10: 10
PAINLEVEL_OUTOF10: 6
PAINLEVEL_OUTOF10: 4
PAINLEVEL_OUTOF10: 9
PAINLEVEL_OUTOF10: 0
PAINLEVEL_OUTOF10: 10
PAINLEVEL_OUTOF10: 0
PAINLEVEL_OUTOF10: 5
PAINLEVEL_OUTOF10: 9
PAINLEVEL_OUTOF10: 10

## 2020-01-23 ASSESSMENT — PAIN - FUNCTIONAL ASSESSMENT
PAIN_FUNCTIONAL_ASSESSMENT: ACTIVITIES ARE NOT PREVENTED
PAIN_FUNCTIONAL_ASSESSMENT: PREVENTS OR INTERFERES SOME ACTIVE ACTIVITIES AND ADLS

## 2020-01-23 ASSESSMENT — PAIN DESCRIPTION - FREQUENCY
FREQUENCY: CONTINUOUS

## 2020-01-23 ASSESSMENT — PAIN DESCRIPTION - LOCATION
LOCATION: ABDOMEN
LOCATION: ABDOMEN
LOCATION: BACK;ABDOMEN
LOCATION: ABDOMEN

## 2020-01-23 ASSESSMENT — PAIN DESCRIPTION - PROGRESSION
CLINICAL_PROGRESSION: NOT CHANGED
CLINICAL_PROGRESSION: GRADUALLY IMPROVING

## 2020-01-23 ASSESSMENT — PAIN DESCRIPTION - PAIN TYPE
TYPE: SURGICAL PAIN
TYPE: ACUTE PAIN
TYPE: SURGICAL PAIN

## 2020-01-23 ASSESSMENT — PAIN DESCRIPTION - ONSET
ONSET: ON-GOING
ONSET: GRADUAL
ONSET: ON-GOING

## 2020-01-23 ASSESSMENT — PAIN DESCRIPTION - ORIENTATION
ORIENTATION: MID
ORIENTATION: LOWER;MID

## 2020-01-23 ASSESSMENT — LIFESTYLE VARIABLES: SMOKING_STATUS: 1

## 2020-01-23 NOTE — ANESTHESIA PRE PROCEDURE
Department of Anesthesiology  Preprocedure Note       Name:  Teresa Slade   Age:  40 y.o.  :  1975                                          MRN:  [id number]         Date:  2020      Surgeon: Chago Dsouza):  Teodoro Sy MD    Procedure: LAPAROSCOPY EXPLORATORY - POSSIBLE OPEN TUBAL ABSCESS (N/A )    Medications prior to admission:   Prior to Admission medications    Medication Sig Start Date End Date Taking? Authorizing Provider   Menthol (ICY HOT) 5 % PTCH Apply 1 patch topically daily as needed   Yes Historical Provider, MD   CALCIUM 600+D HIGH POTENCY 600-400 MG-UNIT TABS per tab Take 1 tablet by mouth daily 1/15/20  Yes Historical Provider, MD   Multiple Vitamin (DAILY-MIGNON) TABS Take 1 tablet by mouth daily 1/15/20  Yes Historical Provider, MD   oxyCODONE-acetaminophen (PERCOCET) 5-325 MG per tablet Take 1 tablet by mouth daily. 20  Yes Historical Provider, MD   rOPINIRole (REQUIP) 1 MG tablet Take 1 mg by mouth nightly 19  Yes Historical Provider, MD   LORazepam (ATIVAN) 0.5 MG tablet Take 0.5 mg by mouth every 6 hours as needed for Anxiety. Yes Historical Provider, MD   QUEtiapine (SEROQUEL) 100 MG tablet Take 100 mg by mouth nightly    Yes Historical Provider, MD   polycarbophil (FIBERCON) 625 MG tablet Take 625 mg by mouth daily   Yes Historical Provider, MD   butalbital-aspirin-caffeine [facility]) -40 MG capsule Take by mouth every 4 hours as needed for Headaches.    Yes Historical Provider, MD   prochlorperazine (COMPAZINE) 5 MG tablet Take 5 mg by mouth every 6 hours as needed for Nausea   Yes Historical Provider, MD   diphenhydrAMINE (BENADRYL) 25 MG capsule Take 25 mg by mouth every 6 hours as needed for Itching   Yes Historical Provider, MD   ondansetron (ZOFRAN ODT) 4 MG disintegrating tablet Take 1 tablet by mouth every 8 hours as needed for Nausea 1/3/17  Yes Gauri Francis MD   hydrocortisone 2.5 % cream Apply topically daily as needed 20 Historical Provider, MD   fluticasone (FLONASE) 50 MCG/ACT nasal spray 1 spray by Nasal route daily 1/7/20   Historical Provider, MD   albuterol sulfate HFA (PROVENTIL HFA) 108 (90 Base) MCG/ACT inhaler Inhale 2 puffs into the lungs every 4 hours as needed for Wheezing or Shortness of Breath With spacer (and mask if indicated). Thanks.  9/11/19 10/11/19  Miguel Rg MD   acetaminophen (TYLENOL) 325 MG tablet Take 2 tablets by mouth every 6 hours as needed for Pain 8/11/19   Mohini Gusman DO       Current medications:    Current Facility-Administered Medications   Medication Dose Route Frequency Provider Last Rate Last Dose    LORazepam (ATIVAN) injection 1 mg  1 mg Intravenous Q6H PRN Yuli Rodriguez MD   1 mg at 01/23/20 0900    magnesium oxide (MAG-OX) tablet 400 mg  400 mg Oral TID Eber Michel MD   400 mg at 01/22/20 2111    HYDROmorphone (DILAUDID) injection 0.5 mg  0.5 mg Intravenous Q2H PRN Jules Talbot MD   0.5 mg at 01/23/20 6621    doxycycline (VIBRAMYCIN) 100 mg in dextrose 5 % 100 mL IVPB  100 mg Intravenous Q12H Brooke Valerio MD   Stopped at 01/23/20 1049    ondansetron (ZOFRAN-ODT) disintegrating tablet 4 mg  4 mg Oral Q8H PRN Yuli Rodriguez MD        QUEtiapine (SEROQUEL) tablet 100 mg  100 mg Oral Nightly Yuli Rodriguez MD        prochlorperazine (COMPAZINE) tablet 5 mg  5 mg Oral Q6H PRN Yuli Rodriguez MD        diphenhydrAMINE (BENADRYL) tablet 25 mg  25 mg Oral Q6H PRN Yuli Rodriguez MD        albuterol sulfate  (90 Base) MCG/ACT inhaler 2 puff  2 puff Inhalation Q4H PRN Yuli Rodriguez MD        multivitamin 1 tablet  1 tablet Oral Daily Yuli Rodriguez MD        oxyCODONE-acetaminophen (PERCOCET) 5-325 MG per tablet 1 tablet  1 tablet Oral Q8H PRN Yuli Rodriguez MD        rOPINIRole (REQUIP) tablet 1 mg  1 mg Oral Nightly Yuli Rodriguez MD        fluticasone (FLONASE) 50 MCG/ACT nasal spray 1 spray  1 spray Nasal Daily Eber Michel MD  LORazepam (ATIVAN) tablet 0.5 mg  0.5 mg Oral Q6H PRN Yuli Rodriguez MD   0.5 mg at 01/22/20 1411    sodium chloride flush 0.9 % injection 10 mL  10 mL Intravenous BID Yuli Rodriguez MD   10 mL at 01/23/20 0951    sodium chloride flush 0.9 % injection 10 mL  10 mL Intravenous PRN Toya Akbar MD        magnesium hydroxide (MILK OF MAGNESIA) 400 MG/5ML suspension 30 mL  30 mL Oral Daily PRN Yuli Rodriguez MD        ondansetron (ZOFRAN) injection 4 mg  4 mg Intravenous Q6H PRN Yuli Rodriguez MD   4 mg at 01/23/20 0702    enoxaparin (LOVENOX) injection 40 mg  40 mg Subcutaneous Daily Yuli Rodriguez MD   40 mg at 01/23/20 0950    meropenem (MERREM) 2 g in sodium chloride 0.9 % 100 mL IVPB  2 g Intravenous Q8H Toya Akbar  mL/hr at 01/23/20 1224 2 g at 01/23/20 1224    vancomycin (VANCOCIN) 1000 mg in dextrose 5% 200 mL IVPB  1,000 mg Intravenous Q12H Toya Akbar MD   Stopped at 01/23/20 0254    0.9 % sodium chloride infusion   Intravenous Continuous Toya Akbar  mL/hr at 01/23/20 0214         Allergies:     Allergies   Allergen Reactions    Morphine And Related Hives    Ibuprofen Hives    Motrin [Ibuprofen Micronized] Hives       Problem List:    Patient Active Problem List   Diagnosis Code    Abdominal pain R10.9    Leukocytosis D72.829    Nausea & vomiting R11.2       Past Medical History:        Diagnosis Date    Asthma     [facility] [address]     Depression     Migraine     Schizophrenia ([facility] [address]     Scoliosis        Past Surgical History:        Procedure Laterality Date    ABDOMEN SURGERY      laproscope    ANUS SURGERY      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, had one constructed as an infant       Social History:    Social History     Tobacco Use    Smoking status: Current Every Day Smoker     Packs/day: 1.00     Years: 20.00     Pack years: 20.00     Types: Cigarettes    Smokeless tobacco: Never Used   Substance Use Topics    Alcohol use: Yes     Comment: occassionally                                Ready to quit: Not Answered  Counseling given: Not Answered      Vital Signs (Current):   Vitals:    01/22/20 2100 01/23/20 0635 01/23/20 0830 01/23/20 0915   BP: 112/72 124/89 131/72 123/82   Pulse: 81 106 96 80   Resp: 14 20 18 17   Temp: 36.7 °C (98 °F) 37.1 °C (98.8 °F) 37.8 °C (100.1 °F) 36.8 °C (98.3 °F)   TempSrc: Oral Oral Oral Oral   SpO2: 99% 100% 100% 99%   Weight:       Height:                                                  BP Readings from Last 3 Encounters:   01/23/20 123/82   09/11/19 124/87   08/11/19 (!) 159/88       NPO Status:                                                                                 BMI:   Wt Readings from Last 3 Encounters:   01/21/20 107 lb (48.5 kg)   09/11/19 105 lb (47.6 kg)   08/11/19 108 lb (49 kg)     Body mass index is 17.27 kg/m². CBC:   Lab Results   Component Value Date    WBC 17.7 01/23/2020    RBC 3.75 01/23/2020    HGB 11.9 01/23/2020    HCT 37.3 01/23/2020    MCV 99.5 01/23/2020    RDW 12.8 01/23/2020     01/23/2020       CMP:   Lab Results   Component Value Date     01/23/2020    K 3.4 01/23/2020     01/23/2020    CO2 30 01/23/2020    BUN 3 01/23/2020    CREATININE 0.6 01/23/2020    GFRAA >60 01/23/2020    LABGLOM >60 01/23/2020    GLUCOSE 109 01/23/2020    PROT 6.0 01/22/2020    PROT 7.2 06/23/2012    CALCIUM 8.9 01/23/2020    BILITOT 0.5 01/22/2020    ALKPHOS 127 01/22/2020    AST 19 01/22/2020    ALT 19 01/22/2020       POC Tests: No results for input(s): POCGLU, POCNA, POCK, POCCL, POCBUN, POCHEMO, POCHCT in the last 72 hours.     Coags:   Lab Results   Component Value Date    PROTIME 11.8 01/23/2020    INR 0.98 01/23/2020    APTT 43.2 01/23/2020       HCG (If Applicable):   Lab Results   Component Value Date    PREGTESTUR NEGATIVE 01/03/2017        ABGs: No results found for: PHART, PO2ART, UJX6VIE, YBI2LPR, BEART, X0DONZCG     Type & Screen (If Applicable):  No results

## 2020-01-24 PROBLEM — N73.6 PELVIC PERITONEAL ADHESIONS, FEMALE (POSTOPERATIVE) (POSTINFECTION): Status: ACTIVE | Noted: 2020-01-24

## 2020-01-24 PROBLEM — N70.93 LEFT TUBO-OVARIAN ABSCESS: Status: ACTIVE | Noted: 2020-01-24

## 2020-01-24 LAB
ALBUMIN SERPL-MCNC: 3 GM/DL (ref 3.4–5)
ALP BLD-CCNC: 114 IU/L (ref 40–128)
ALT SERPL-CCNC: 14 U/L (ref 10–40)
ANION GAP SERPL CALCULATED.3IONS-SCNC: 11 MMOL/L (ref 4–16)
ANION GAP SERPL CALCULATED.3IONS-SCNC: 12 MMOL/L (ref 4–16)
AST SERPL-CCNC: 16 IU/L (ref 15–37)
BANDED NEUTROPHILS ABSOLUTE COUNT: 2.43 K/CU MM
BANDED NEUTROPHILS RELATIVE PERCENT: 17 % (ref 5–11)
BILIRUB SERPL-MCNC: 0.2 MG/DL (ref 0–1)
BUN BLDV-MCNC: 3 MG/DL (ref 6–23)
BUN BLDV-MCNC: 3 MG/DL (ref 6–23)
CALCIUM SERPL-MCNC: 8.1 MG/DL (ref 8.3–10.6)
CALCIUM SERPL-MCNC: 8.9 MG/DL (ref 8.3–10.6)
CHLORIDE BLD-SCNC: 101 MMOL/L (ref 99–110)
CHLORIDE BLD-SCNC: 97 MMOL/L (ref 99–110)
CO2: 27 MMOL/L (ref 21–32)
CO2: 28 MMOL/L (ref 21–32)
CREAT SERPL-MCNC: 0.6 MG/DL (ref 0.6–1.1)
CREAT SERPL-MCNC: 0.6 MG/DL (ref 0.6–1.1)
DIFFERENTIAL TYPE: ABNORMAL
GFR AFRICAN AMERICAN: >60 ML/MIN/1.73M2
GFR AFRICAN AMERICAN: >60 ML/MIN/1.73M2
GFR NON-AFRICAN AMERICAN: >60 ML/MIN/1.73M2
GFR NON-AFRICAN AMERICAN: >60 ML/MIN/1.73M2
GLUCOSE BLD-MCNC: 132 MG/DL (ref 70–99)
GLUCOSE BLD-MCNC: 145 MG/DL (ref 70–99)
HCT VFR BLD CALC: 28.8 % (ref 37–47)
HEMOGLOBIN: 9 GM/DL (ref 12.5–16)
LYMPHOCYTES ABSOLUTE: 1.4 K/CU MM
LYMPHOCYTES RELATIVE PERCENT: 10 % (ref 24–44)
MACROCYTES: ABNORMAL
MCH RBC QN AUTO: 31.7 PG (ref 27–31)
MCHC RBC AUTO-ENTMCNC: 31.3 % (ref 32–36)
MCV RBC AUTO: 101.4 FL (ref 78–100)
MONOCYTES ABSOLUTE: 1 K/CU MM
MONOCYTES RELATIVE PERCENT: 7 % (ref 0–4)
PDW BLD-RTO: 13 % (ref 11.7–14.9)
PLATELET # BLD: 194 K/CU MM (ref 140–440)
PLT MORPHOLOGY: ABNORMAL
PMV BLD AUTO: 10.6 FL (ref 7.5–11.1)
POLYCHROMASIA: ABNORMAL
POTASSIUM SERPL-SCNC: 3.4 MMOL/L (ref 3.5–5.1)
POTASSIUM SERPL-SCNC: ABNORMAL MMOL/L (ref 3.5–5.1)
RBC # BLD: 2.84 M/CU MM (ref 4.2–5.4)
SEGMENTED NEUTROPHILS ABSOLUTE COUNT: 9.5 K/CU MM
SEGMENTED NEUTROPHILS RELATIVE PERCENT: 66 % (ref 36–66)
SODIUM BLD-SCNC: 137 MMOL/L (ref 135–145)
SODIUM BLD-SCNC: 139 MMOL/L (ref 135–145)
TOTAL PROTEIN: 5 GM/DL (ref 6.4–8.2)
TOXIC GRANULATION: PRESENT
WBC # BLD: 14.3 K/CU MM (ref 4–10.5)

## 2020-01-24 PROCEDURE — 2580000003 HC RX 258: Performed by: OBSTETRICS & GYNECOLOGY

## 2020-01-24 PROCEDURE — 6360000002 HC RX W HCPCS: Performed by: OBSTETRICS & GYNECOLOGY

## 2020-01-24 PROCEDURE — 1200000000 HC SEMI PRIVATE

## 2020-01-24 PROCEDURE — 85027 COMPLETE CBC AUTOMATED: CPT

## 2020-01-24 PROCEDURE — 80053 COMPREHEN METABOLIC PANEL: CPT

## 2020-01-24 PROCEDURE — 6370000000 HC RX 637 (ALT 250 FOR IP): Performed by: FAMILY MEDICINE

## 2020-01-24 PROCEDURE — 94640 AIRWAY INHALATION TREATMENT: CPT

## 2020-01-24 PROCEDURE — 85007 BL SMEAR W/DIFF WBC COUNT: CPT

## 2020-01-24 PROCEDURE — 94761 N-INVAS EAR/PLS OXIMETRY MLT: CPT

## 2020-01-24 PROCEDURE — 6370000000 HC RX 637 (ALT 250 FOR IP): Performed by: OBSTETRICS & GYNECOLOGY

## 2020-01-24 PROCEDURE — 94664 DEMO&/EVAL PT USE INHALER: CPT

## 2020-01-24 PROCEDURE — 80048 BASIC METABOLIC PNL TOTAL CA: CPT

## 2020-01-24 PROCEDURE — 36415 COLL VENOUS BLD VENIPUNCTURE: CPT

## 2020-01-24 RX ORDER — GUAIFENESIN 600 MG/1
1200 TABLET, EXTENDED RELEASE ORAL 2 TIMES DAILY
Status: DISCONTINUED | OUTPATIENT
Start: 2020-01-24 | End: 2020-01-30 | Stop reason: HOSPADM

## 2020-01-24 RX ORDER — CALCIUM CARBONATE 200(500)MG
500 TABLET,CHEWABLE ORAL 3 TIMES DAILY PRN
Status: DISCONTINUED | OUTPATIENT
Start: 2020-01-24 | End: 2020-01-30 | Stop reason: HOSPADM

## 2020-01-24 RX ORDER — GUAIFENESIN/DEXTROMETHORPHAN 100-10MG/5
10 SYRUP ORAL EVERY 6 HOURS PRN
Status: DISCONTINUED | OUTPATIENT
Start: 2020-01-24 | End: 2020-01-30 | Stop reason: HOSPADM

## 2020-01-24 RX ORDER — IPRATROPIUM BROMIDE AND ALBUTEROL SULFATE 2.5; .5 MG/3ML; MG/3ML
1 SOLUTION RESPIRATORY (INHALATION)
Status: DISCONTINUED | OUTPATIENT
Start: 2020-01-24 | End: 2020-01-30 | Stop reason: HOSPADM

## 2020-01-24 RX ORDER — MAGNESIUM OXIDE 400 MG/1
400 TABLET ORAL 2 TIMES DAILY
Status: DISCONTINUED | OUTPATIENT
Start: 2020-01-24 | End: 2020-01-24 | Stop reason: SDUPTHER

## 2020-01-24 RX ORDER — POTASSIUM CHLORIDE 20 MEQ/1
20 TABLET, EXTENDED RELEASE ORAL 2 TIMES DAILY
Status: DISCONTINUED | OUTPATIENT
Start: 2020-01-24 | End: 2020-01-24

## 2020-01-24 RX ORDER — LANOLIN ALCOHOL/MO/W.PET/CERES
400 CREAM (GRAM) TOPICAL 2 TIMES DAILY
Status: DISCONTINUED | OUTPATIENT
Start: 2020-01-24 | End: 2020-01-30 | Stop reason: HOSPADM

## 2020-01-24 RX ORDER — POTASSIUM CHLORIDE 7.45 MG/ML
20 INJECTION INTRAVENOUS ONCE
Status: DISCONTINUED | OUTPATIENT
Start: 2020-01-24 | End: 2020-01-24 | Stop reason: SDUPTHER

## 2020-01-24 RX ORDER — POTASSIUM CHLORIDE 20 MEQ/1
40 TABLET, EXTENDED RELEASE ORAL ONCE
Status: COMPLETED | OUTPATIENT
Start: 2020-01-24 | End: 2020-01-24

## 2020-01-24 RX ORDER — POTASSIUM CHLORIDE 7.45 MG/ML
10 INJECTION INTRAVENOUS
Status: DISPENSED | OUTPATIENT
Start: 2020-01-24 | End: 2020-01-24

## 2020-01-24 RX ADMIN — SODIUM CHLORIDE, SODIUM LACTATE, POTASSIUM CHLORIDE, CALCIUM CHLORIDE AND DEXTROSE MONOHYDRATE: 5; 600; 310; 30; 20 INJECTION, SOLUTION INTRAVENOUS at 03:50

## 2020-01-24 RX ADMIN — IPRATROPIUM BROMIDE AND ALBUTEROL SULFATE 1 AMPULE: .5; 3 SOLUTION RESPIRATORY (INHALATION) at 19:57

## 2020-01-24 RX ADMIN — DOCUSATE SODIUM 100 MG: 100 CAPSULE, LIQUID FILLED ORAL at 09:51

## 2020-01-24 RX ADMIN — Medication 400 MG: at 20:59

## 2020-01-24 RX ADMIN — VANCOMYCIN HYDROCHLORIDE 1250 MG: 5 INJECTION, POWDER, LYOPHILIZED, FOR SOLUTION INTRAVENOUS at 17:06

## 2020-01-24 RX ADMIN — HYDROMORPHONE HYDROCHLORIDE 1 MG: 1 INJECTION, SOLUTION INTRAMUSCULAR; INTRAVENOUS; SUBCUTANEOUS at 13:56

## 2020-01-24 RX ADMIN — SODIUM CHLORIDE, PRESERVATIVE FREE 10 ML: 5 INJECTION INTRAVENOUS at 20:59

## 2020-01-24 RX ADMIN — MEROPENEM 2 G: 1 INJECTION, POWDER, FOR SOLUTION INTRAVENOUS at 00:55

## 2020-01-24 RX ADMIN — SODIUM CHLORIDE, PRESERVATIVE FREE 10 ML: 5 INJECTION INTRAVENOUS at 09:52

## 2020-01-24 RX ADMIN — DOCUSATE SODIUM 100 MG: 100 CAPSULE, LIQUID FILLED ORAL at 20:59

## 2020-01-24 RX ADMIN — GUAIFENESIN 1200 MG: 600 TABLET, EXTENDED RELEASE ORAL at 20:59

## 2020-01-24 RX ADMIN — MEROPENEM 2 G: 1 INJECTION, POWDER, FOR SOLUTION INTRAVENOUS at 19:16

## 2020-01-24 RX ADMIN — Medication 400 MG: at 11:40

## 2020-01-24 RX ADMIN — SODIUM CHLORIDE, SODIUM LACTATE, POTASSIUM CHLORIDE, CALCIUM CHLORIDE AND DEXTROSE MONOHYDRATE: 5; 600; 310; 30; 20 INJECTION, SOLUTION INTRAVENOUS at 20:59

## 2020-01-24 RX ADMIN — MEROPENEM 2 G: 1 INJECTION, POWDER, FOR SOLUTION INTRAVENOUS at 09:48

## 2020-01-24 RX ADMIN — OXYCODONE HYDROCHLORIDE AND ACETAMINOPHEN 2 TABLET: 5; 325 TABLET ORAL at 17:10

## 2020-01-24 RX ADMIN — POTASSIUM CHLORIDE 40 MEQ: 1500 TABLET, EXTENDED RELEASE ORAL at 11:35

## 2020-01-24 RX ADMIN — HYDROMORPHONE HYDROCHLORIDE 1 MG: 1 INJECTION, SOLUTION INTRAMUSCULAR; INTRAVENOUS; SUBCUTANEOUS at 05:39

## 2020-01-24 RX ADMIN — HYDROMORPHONE HYDROCHLORIDE 1 MG: 1 INJECTION, SOLUTION INTRAMUSCULAR; INTRAVENOUS; SUBCUTANEOUS at 22:02

## 2020-01-24 RX ADMIN — OXYCODONE HYDROCHLORIDE AND ACETAMINOPHEN 2 TABLET: 5; 325 TABLET ORAL at 11:49

## 2020-01-24 RX ADMIN — HYDROMORPHONE HYDROCHLORIDE 1 MG: 1 INJECTION, SOLUTION INTRAMUSCULAR; INTRAVENOUS; SUBCUTANEOUS at 09:47

## 2020-01-24 RX ADMIN — SODIUM CHLORIDE, SODIUM LACTATE, POTASSIUM CHLORIDE, CALCIUM CHLORIDE AND DEXTROSE MONOHYDRATE: 5; 600; 310; 30; 20 INJECTION, SOLUTION INTRAVENOUS at 12:36

## 2020-01-24 RX ADMIN — POTASSIUM CHLORIDE 20 MEQ: 1500 TABLET, EXTENDED RELEASE ORAL at 09:54

## 2020-01-24 RX ADMIN — SODIUM CHLORIDE, PRESERVATIVE FREE 10 ML: 5 INJECTION INTRAVENOUS at 22:02

## 2020-01-24 RX ADMIN — VANCOMYCIN HYDROCHLORIDE 1250 MG: 5 INJECTION, POWDER, LYOPHILIZED, FOR SOLUTION INTRAVENOUS at 03:11

## 2020-01-24 RX ADMIN — OXYCODONE HYDROCHLORIDE AND ACETAMINOPHEN 2 TABLET: 5; 325 TABLET ORAL at 03:50

## 2020-01-24 ASSESSMENT — PAIN - FUNCTIONAL ASSESSMENT
PAIN_FUNCTIONAL_ASSESSMENT: ACTIVITIES ARE NOT PREVENTED
PAIN_FUNCTIONAL_ASSESSMENT: PREVENTS OR INTERFERES SOME ACTIVE ACTIVITIES AND ADLS
PAIN_FUNCTIONAL_ASSESSMENT: ACTIVITIES ARE NOT PREVENTED
PAIN_FUNCTIONAL_ASSESSMENT: PREVENTS OR INTERFERES SOME ACTIVE ACTIVITIES AND ADLS

## 2020-01-24 ASSESSMENT — PAIN DESCRIPTION - ONSET
ONSET: ON-GOING

## 2020-01-24 ASSESSMENT — PAIN DESCRIPTION - PAIN TYPE
TYPE: SURGICAL PAIN

## 2020-01-24 ASSESSMENT — PAIN DESCRIPTION - PROGRESSION
CLINICAL_PROGRESSION: GRADUALLY WORSENING
CLINICAL_PROGRESSION: GRADUALLY WORSENING
CLINICAL_PROGRESSION: GRADUALLY IMPROVING
CLINICAL_PROGRESSION: GRADUALLY WORSENING
CLINICAL_PROGRESSION: GRADUALLY WORSENING
CLINICAL_PROGRESSION: NOT CHANGED

## 2020-01-24 ASSESSMENT — PAIN SCALES - GENERAL
PAINLEVEL_OUTOF10: 10
PAINLEVEL_OUTOF10: 8
PAINLEVEL_OUTOF10: 10
PAINLEVEL_OUTOF10: 8
PAINLEVEL_OUTOF10: 5
PAINLEVEL_OUTOF10: 5
PAINLEVEL_OUTOF10: 10
PAINLEVEL_OUTOF10: 10
PAINLEVEL_OUTOF10: 5
PAINLEVEL_OUTOF10: 10
PAINLEVEL_OUTOF10: 2
PAINLEVEL_OUTOF10: 10
PAINLEVEL_OUTOF10: 10
PAINLEVEL_OUTOF10: 8
PAINLEVEL_OUTOF10: 8

## 2020-01-24 ASSESSMENT — PAIN DESCRIPTION - LOCATION
LOCATION: ABDOMEN

## 2020-01-24 ASSESSMENT — PAIN DESCRIPTION - ORIENTATION
ORIENTATION: MID

## 2020-01-24 ASSESSMENT — PAIN DESCRIPTION - FREQUENCY
FREQUENCY: CONTINUOUS

## 2020-01-24 ASSESSMENT — PAIN DESCRIPTION - DESCRIPTORS
DESCRIPTORS: SHARP
DESCRIPTORS: ACHING

## 2020-01-24 NOTE — BRIEF OP NOTE
[id number]    Department of Obstetrics and Gynecology   Brief Operative Report        Pre-operative Diagnosis:  PID with TOA  Post-operative Diagnosis:  Same    Procedure: laparoscopy, drainage of TOA, extensive adhesiolysis, LS)  Surgeon:  Dr. Kia Baez     Anesthesia:  GETA  Findings: Left TOA, severe pelvic adhesions  Estimated blood loss:  775cc  Specimens: left tube and necrotic ovary     Complications:  none    Condition:  good        See dictated operative report for full details.

## 2020-01-24 NOTE — PROGRESS NOTES
POD 1 exp lap with KAE and left salpingo-oophorectomy. AF VSS. Did well overnight. Pain  Controlled. Good UOP. JERRI drain is sanginous, to be expected. Monitor output. H/H stable, 10/9.8  abd is soft, drsg c/d/i. JERRI intact and draining well. Continue with iv abx's, f/u in cx's. Keep on clrs. Mobilize. Dr. Loreto Santiago will be on call for me this weekend. Back on Monday.

## 2020-01-24 NOTE — PROGRESS NOTES
Attending Progress Note      PCP: Harish Rider MD    Patient: Anai Torres   Gender: female  : 1975   Age: 40 y.o. MRN: [id number]      Date of Admission: 2020    Chief Complaint:   Chief Complaint   Patient presents with    Abdominal Pain    Back Pain    Nausea           Subjective: still has abd pain , but improved . Medications:  Reviewed  Infusion Medications    dextrose 5% in lactated ringers      sodium chloride 100 mL/hr at 20 1517     Scheduled Medications    [START ON 2020] vancomycin  1,250 mg Intravenous Q12H    magnesium oxide  400 mg Oral TID    doxycycline (VIBRAMYCIN) IV  100 mg Intravenous Q12H    QUEtiapine  100 mg Oral Nightly    multivitamin  1 tablet Oral Daily    rOPINIRole  1 mg Oral Nightly    fluticasone  1 spray Nasal Daily    sodium chloride flush  10 mL Intravenous BID    enoxaparin  40 mg Subcutaneous Daily    meropenem  2 g Intravenous Q8H     PRN Meds: LORazepam, fentanNYL, fentanNYL, fentanNYL, fentanNYL, ondansetron, ondansetron, labetalol, hydrALAZINE, HYDROmorphone, ondansetron, prochlorperazine, diphenhydrAMINE, albuterol sulfate HFA, oxyCODONE-acetaminophen, LORazepam, sodium chloride flush, magnesium hydroxide, ondansetron      Intake/Output Summary (Last 24 hours) at 2020 1938  Last data filed at 2020 1917  Gross per 24 hour   Intake 1200 ml   Output 1275 ml   Net -75 ml       Exam:  /85   Pulse 84   Temp 99 °F (37.2 °C) (Tympanic)   Resp 16   Ht 5' 6\" (1.676 m)   Wt 107 lb (48.5 kg)   LMP 2019   SpO2 100%   BMI 17.27 kg/m²   General appearance: mild to moderate  distress,   Respiratory:  good air entry , no Rales , No wheezing, or rhonchi,  Cardiovascular: RRR, with normal S1/S2 without murmurs. Abdomen : Soft, tender, non-distended  , normal bowel sounds. Legs : No edema bilaterally.  No DVT signs ,    Neurologic:  Alert and oriented ,        Labs:   Recent Labs     20  1153 01/22/20  1320 01/23/20  0816 01/23/20  1830   WBC 19.3* 19.9* 17.7*  --    HGB 14.6 11.3* 11.9* 10.0*   HCT 45.4 35.6* 37.3 32.7*    207 228  --      Recent Labs     01/21/20  1153 01/22/20  1320 01/23/20  0816    137 141   K 3.2* 3.0  K CALLED TO ANTON OTOOLE RN ON [id number] AT 1426 BY [facility]  RESULTS READ BACK  * 3.4*   CL 97* 100 100   CO2 23 24 30   BUN 6 4* 3*   CREATININE 0.8 0.6 0.6   CALCIUM 9.2 8.5 8.9     Recent Labs     01/21/20  1153 01/22/20  1320   AST 39* 19   ALT 30 19   BILITOT 0.4 0.5   ALKPHOS 166* 127     Recent Labs     01/23/20  0816   INR 0.98     No results for input(s): CKTOTAL, TROPONINI in the last 72 hours. Assessment/Plan:    Active Hospital Problems    Diagnosis Date Noted    Abdominal pain [R10.9] 01/21/2020     Priority: High    Leukocytosis [D72.829] 01/21/2020    Nausea & vomiting [R11.2] 01/21/2020     Assessment/Plan:   Tele - no event   IVF  OB/GYN input noted . . plan for  LAPAROSCOPY EXPLORATORY   IV ABX , blood c/s: not done . . and urine c/s : in process   Pain control   Symptomatic treatment   Home meds   DVT proph   Diet: Diet NPO, After Midnight  Code Status: Full Code    Treatment progress and plan was d/w pt/family .         Remington Barragan MD

## 2020-01-24 NOTE — OP NOTE
[address], [facility] [facility]                                OPERATIVE REPORT    PATIENT NAME: Tevin Stephens                  :        1975  MED REC NO:   [id number]                          ROOM:       8485  ACCOUNT NO:   [de-identified]                           ADMIT DATE: 2020  PROVIDER:     Vijay Green MD    DATE OF PROCEDURE:  2020    PREOPERATIVE DIAGNOSIS:  Tubo-ovarian abscess, acute PID. POSTOPERATIVE DIAGNOSES:  1.  Tubo-ovarian abscess, acute PID. 2.  Severe pelvic adhesions. PROCEDURE PERFORMED:  Laparoscopy with extensive adhesiolysis and  drainage of tubo-ovarian abscess. Laparotomy with extensive  adhesiolysis with further drainage of several tubo-ovarian abscess, left  salpingo-oophorectomy. SURGEON:  Vijay Green MD and Jennifer Roblero MD (intraoperative  consult). ANESTHESIA:  General endotracheal anesthesia. ESTIMATED BLOOD LOSS:  775 mL. SPECIMEN:  Left tube and ovary. DRAINS:  None. COMPLICATIONS:  None. INDICATION FOR OPERATION:  This is a 49-year-old female who is being  treated with broad-spectrum antibiotics for acute PID with tubo-ovarian  abscess and the abscess was not decreasing in size, her leukocytosis was  not improving, her pain was not improving and she was having low-grade  temperatures of 100.1. Due to all of these findings, it was felt that  surgical evaluation was warranted. Please note preoperatively,  Interventional Radiology did evaluate for a CT-guided drainage; however,  this abscess was not accessible for Interventional Radiology per  CT-guided drainage. FINDINGS:  The laparoscopy show transverse colon adhered to the anterior  abdominal wall, adhered to the falciform ligament. Ascending colon  ileocecal junction adhered to the anterior abdominal wall.   The appendix  was normal.  The liver edge was normal.  There was no evidence of Leta Erb  disease. On the patient's left side after the adhesions were taken  down, the left tube appeared very slightly edematous with a normal  fimbriated end. The ovary had a small cyst, but was otherwise normal.   On the patient's left side, the fallopian tube was markedly dilated. The preoperative CT and ultrasound after surgical look the entire  tubo-ovarian abscess started on the patient's left side across the  midline. The left fallopian tube was at least 8 to 10 cm and extremely  dilated. It took extensive dissection to determine the normal patient's  anatomy on the left side. The ureter was dissected on the left side  throughout its pelvic course. The bowel was ran and there was no  concern for any type of bowel injury. The uterus itself did have a  fibroid. This was pedunculated on the left side. The posterior  cul-de-sac and the sigmoid colon are massively adhered together. The  sigmoid colon was massively adhered to the left fallopian tube and  ovary. Other than the necrotic appearing ovary, it was very difficult  to identify this ovary. A large amount of green malodorous purulent  material was drained from inferior to the uterus, inferior to the  fallopian tube and in the para-ovarian fossa inferior to the sigmoid  colon. OPERATIVE PROCEDURE:  After informed consent was obtained, the patient  was brought to the operating suite where general endotracheal anesthesia  was obtained by the Anesthesia service without complication. The patient  was placed in the lithotomy position. She was prepped and draped in  normal sterile fashion. The Giang catheter was placed. A sponge stick  was placed in the patient's vagina. Gloves were changed. Attention was  then turned to the abdomen. All incisions were infiltrated with 1%  lidocaine with epinephrine prior to the incision to be made. A 5 mm  supraumbilical skin incision was made.   A 5 mm trocar and sleeve were  introduced the dissection. The left  infundibulopelvic ligament was isolated. The left ureter was isolated. The left infundibulopelvic ligament was grasped with the LigaSure  device, sealed, and cut. I continued this adhesiolysis to get the  sigmoid colon off the pelvic sidewall as well as the sigmoid colon. The  mesosalpinx was then grasped, sealed, and cut with the LigaSure device. The proximal aspect of the tube was grasped, sealed, and cut with the  LigaSure device and the uretero-ovarian ligament was grasped, cut, and  sealed with the LigaSure device. The fallopian tube and the necrotic  ovary were removed and sent to pathology. The ureter was continued to  be dissected off throughout the pelvic course. There was some small  oozing on the pelvic sidewall, which was venous in nature. Hemostasis  was assured with cautery and at the conclusion of the procedure, a  fibrillar was placed. In addition, the uterus did have some oozing on  from where the massive dissection was performed which was cauterized. Pelvis was copiously irrigated. All surgical sites were hemostatic. Fibrillar was placed on the left pelvic sidewall. The entire bowel was  inspected. There was no concern for bowel injury. In addition, during  this dissection the transverse colon was dissected off the anterior  abdominal wall. Dr. Priyanka Deluca then closed the fascia with #1 PDS in a  continuous running fashion. I then closed the skin with 4-0 Monocryl in  a subcuticular fashion. Prior to the fascial closure, the JERRI drain was  placed via the left lower quadrant laparoscopic trocar site. Steri-Strips were applied. The laparoscopic incisions were closed with  4-0 Monocryl and a sterile surgical glue was applied. The patient was  placed in the supine position. She was awoken from anesthesia,  extubated in the operating room, brought to recovery room in stable  condition.         Mckenzie Roper MD    D: 01/23/2020 19:54:59       T: 01/23/2020 22:35:18     [email or web address]  Job#: [id number]     Doc#: [id number]    CC:  MD Vijay Guadarrama MD Roxann Overly, MD

## 2020-01-24 NOTE — PROGRESS NOTES
1922-arrived from OR, monitors applied. Report received from [employer] and [employer]. Respirations even and unlabored. Abdominal dressings dry and intact. Abdomen soft. VSS  1932-Hgb 10. Robbin Juarez notified. VSS  2002-Pt to room 4009. Kane RN at bedside to assume primary care.

## 2020-01-24 NOTE — ANESTHESIA POSTPROCEDURE EVALUATION
Department of Anesthesiology  Postprocedure Note    Patient: Willy Munroe  MRN: [id number]  YOB: 1975  Date of evaluation: 1/23/2020  Time:  7:31 PM     Procedure Summary     Date:  01/23/20 Room / Location:  [place] [address] 03 / [facility]    Anesthesia Start:  1614 Anesthesia Stop:  1931    Procedure:  LAPAROSCOPY EXPLORATORY -CONVERTED TO  OPEN LEFT SALPINGECTOMY AND OOPHORECTOMY AND DRAINAGE OF TUBE OVARIAN ABSCESS AND LYSIS OF ADHESIONS (N/A ) Diagnosis:  (.)    Surgeon:  Kassi Baer MD Responsible Provider:  Jagjit Knowles MD    Anesthesia Type:  general ASA Status:  3 - Emergent          Anesthesia Type: general    Haley Phase I:      Haley Phase II:      Last vitals: Reviewed and per EMR flowsheets.        Anesthesia Post Evaluation    Patient location during evaluation: PACU  Patient participation: waiting for patient participation  Level of consciousness: sleepy but conscious  Airway patency: patent  Nausea & Vomiting: no vomiting and no nausea  Complications: no  Cardiovascular status: blood pressure returned to baseline and hemodynamically stable  Respiratory status: acceptable, nasal cannula, nonlabored ventilation and spontaneous ventilation  Hydration status: stable

## 2020-01-25 LAB
ANION GAP SERPL CALCULATED.3IONS-SCNC: 9 MMOL/L (ref 4–16)
ANION GAP SERPL CALCULATED.3IONS-SCNC: 9 MMOL/L (ref 4–16)
BUN BLDV-MCNC: 2 MG/DL (ref 6–23)
BUN BLDV-MCNC: 2 MG/DL (ref 6–23)
CALCIUM SERPL-MCNC: 8.1 MG/DL (ref 8.3–10.6)
CALCIUM SERPL-MCNC: 8.4 MG/DL (ref 8.3–10.6)
CHLAMYDIA TRACHOMATIS AMPLIFIED DET: NEGATIVE
CHLAMYDIA TRACHOMATIS AMPLIFIED DET: NORMAL
CHLORIDE BLD-SCNC: 100 MMOL/L (ref 99–110)
CHLORIDE BLD-SCNC: 98 MMOL/L (ref 99–110)
CO2: 32 MMOL/L (ref 21–32)
CO2: 32 MMOL/L (ref 21–32)
CREAT SERPL-MCNC: 0.5 MG/DL (ref 0.6–1.1)
CREAT SERPL-MCNC: 0.6 MG/DL (ref 0.6–1.1)
DIFFERENTIAL TYPE: ABNORMAL
GFR AFRICAN AMERICAN: >60 ML/MIN/1.73M2
GFR AFRICAN AMERICAN: >60 ML/MIN/1.73M2
GFR NON-AFRICAN AMERICAN: >60 ML/MIN/1.73M2
GFR NON-AFRICAN AMERICAN: >60 ML/MIN/1.73M2
GLUCOSE BLD-MCNC: 108 MG/DL (ref 70–99)
GLUCOSE BLD-MCNC: 113 MG/DL (ref 70–99)
HCT VFR BLD CALC: 30.2 % (ref 37–47)
HEMOGLOBIN: 9.1 GM/DL (ref 12.5–16)
LYMPHOCYTES ABSOLUTE: 0.5 K/CU MM
LYMPHOCYTES RELATIVE PERCENT: 7 % (ref 24–44)
MAGNESIUM: 2 MG/DL (ref 1.8–2.4)
MCH RBC QN AUTO: 31.8 PG (ref 27–31)
MCHC RBC AUTO-ENTMCNC: 30.1 % (ref 32–36)
MCV RBC AUTO: 105.6 FL (ref 78–100)
MONOCYTES ABSOLUTE: 1.6 K/CU MM
MONOCYTES RELATIVE PERCENT: 21 % (ref 0–4)
N GONORRHOEAE AMPLIFIED DET: NEGATIVE
N GONORRHOEAE AMPLIFIED DET: NORMAL
PDW BLD-RTO: 13 % (ref 11.7–14.9)
PLATELET # BLD: 239 K/CU MM (ref 140–440)
PMV BLD AUTO: 9.6 FL (ref 7.5–11.1)
POTASSIUM SERPL-SCNC: 3.3 MMOL/L (ref 3.5–5.1)
POTASSIUM SERPL-SCNC: ABNORMAL MMOL/L (ref 3.5–5.1)
RBC # BLD: 2.86 M/CU MM (ref 4.2–5.4)
SEGMENTED NEUTROPHILS ABSOLUTE COUNT: 5.5 K/CU MM
SEGMENTED NEUTROPHILS RELATIVE PERCENT: 72 % (ref 36–66)
SODIUM BLD-SCNC: 139 MMOL/L (ref 135–145)
SODIUM BLD-SCNC: 141 MMOL/L (ref 135–145)
TOXIC GRANULATION: PRESENT
WBC # BLD: 7.6 K/CU MM (ref 4–10.5)

## 2020-01-25 PROCEDURE — 85007 BL SMEAR W/DIFF WBC COUNT: CPT

## 2020-01-25 PROCEDURE — 6360000002 HC RX W HCPCS: Performed by: OBSTETRICS & GYNECOLOGY

## 2020-01-25 PROCEDURE — 36415 COLL VENOUS BLD VENIPUNCTURE: CPT

## 2020-01-25 PROCEDURE — 85027 COMPLETE CBC AUTOMATED: CPT

## 2020-01-25 PROCEDURE — 76937 US GUIDE VASCULAR ACCESS: CPT

## 2020-01-25 PROCEDURE — 94761 N-INVAS EAR/PLS OXIMETRY MLT: CPT

## 2020-01-25 PROCEDURE — 2580000003 HC RX 258: Performed by: OBSTETRICS & GYNECOLOGY

## 2020-01-25 PROCEDURE — 83735 ASSAY OF MAGNESIUM: CPT

## 2020-01-25 PROCEDURE — 6370000000 HC RX 637 (ALT 250 FOR IP): Performed by: FAMILY MEDICINE

## 2020-01-25 PROCEDURE — 6360000002 HC RX W HCPCS: Performed by: SURGERY

## 2020-01-25 PROCEDURE — 80048 BASIC METABOLIC PNL TOTAL CA: CPT

## 2020-01-25 PROCEDURE — 1200000000 HC SEMI PRIVATE

## 2020-01-25 PROCEDURE — 94640 AIRWAY INHALATION TREATMENT: CPT

## 2020-01-25 PROCEDURE — 6370000000 HC RX 637 (ALT 250 FOR IP): Performed by: OBSTETRICS & GYNECOLOGY

## 2020-01-25 PROCEDURE — 2500000003 HC RX 250 WO HCPCS: Performed by: SURGERY

## 2020-01-25 RX ORDER — DEXTROSE, SODIUM CHLORIDE, AND POTASSIUM CHLORIDE 5; .45; .15 G/100ML; G/100ML; G/100ML
INJECTION INTRAVENOUS CONTINUOUS
Status: DISCONTINUED | OUTPATIENT
Start: 2020-01-25 | End: 2020-01-27

## 2020-01-25 RX ORDER — POTASSIUM CHLORIDE 20 MEQ/1
40 TABLET, EXTENDED RELEASE ORAL
Status: DISCONTINUED | OUTPATIENT
Start: 2020-01-25 | End: 2020-01-26 | Stop reason: CLARIF

## 2020-01-25 RX ADMIN — SODIUM CHLORIDE, SODIUM LACTATE, POTASSIUM CHLORIDE, CALCIUM CHLORIDE AND DEXTROSE MONOHYDRATE: 5; 600; 310; 30; 20 INJECTION, SOLUTION INTRAVENOUS at 05:18

## 2020-01-25 RX ADMIN — OXYCODONE HYDROCHLORIDE AND ACETAMINOPHEN 2 TABLET: 5; 325 TABLET ORAL at 01:32

## 2020-01-25 RX ADMIN — DOCUSATE SODIUM 100 MG: 100 CAPSULE, LIQUID FILLED ORAL at 08:37

## 2020-01-25 RX ADMIN — POTASSIUM CHLORIDE, DEXTROSE MONOHYDRATE AND SODIUM CHLORIDE: 150; 5; 450 INJECTION, SOLUTION INTRAVENOUS at 14:08

## 2020-01-25 RX ADMIN — GUAIFENESIN 1200 MG: 600 TABLET, EXTENDED RELEASE ORAL at 21:18

## 2020-01-25 RX ADMIN — OXYCODONE HYDROCHLORIDE AND ACETAMINOPHEN 2 TABLET: 5; 325 TABLET ORAL at 21:22

## 2020-01-25 RX ADMIN — IPRATROPIUM BROMIDE AND ALBUTEROL SULFATE 1 AMPULE: .5; 3 SOLUTION RESPIRATORY (INHALATION) at 12:31

## 2020-01-25 RX ADMIN — SODIUM CHLORIDE, PRESERVATIVE FREE 10 ML: 5 INJECTION INTRAVENOUS at 03:57

## 2020-01-25 RX ADMIN — IPRATROPIUM BROMIDE AND ALBUTEROL SULFATE 1 AMPULE: .5; 3 SOLUTION RESPIRATORY (INHALATION) at 08:40

## 2020-01-25 RX ADMIN — DOCUSATE SODIUM 100 MG: 100 CAPSULE, LIQUID FILLED ORAL at 21:18

## 2020-01-25 RX ADMIN — HYDROMORPHONE HYDROCHLORIDE 1 MG: 1 INJECTION, SOLUTION INTRAMUSCULAR; INTRAVENOUS; SUBCUTANEOUS at 23:54

## 2020-01-25 RX ADMIN — HYDROMORPHONE HYDROCHLORIDE 1 MG: 1 INJECTION, SOLUTION INTRAMUSCULAR; INTRAVENOUS; SUBCUTANEOUS at 19:13

## 2020-01-25 RX ADMIN — MEROPENEM 2 G: 1 INJECTION, POWDER, FOR SOLUTION INTRAVENOUS at 14:09

## 2020-01-25 RX ADMIN — IPRATROPIUM BROMIDE AND ALBUTEROL SULFATE 1 AMPULE: .5; 3 SOLUTION RESPIRATORY (INHALATION) at 20:19

## 2020-01-25 RX ADMIN — OXYCODONE HYDROCHLORIDE AND ACETAMINOPHEN 2 TABLET: 5; 325 TABLET ORAL at 12:36

## 2020-01-25 RX ADMIN — Medication 400 MG: at 21:19

## 2020-01-25 RX ADMIN — GUAIFENESIN 1200 MG: 600 TABLET, EXTENDED RELEASE ORAL at 08:37

## 2020-01-25 RX ADMIN — HYDROMORPHONE HYDROCHLORIDE 1 MG: 1 INJECTION, SOLUTION INTRAMUSCULAR; INTRAVENOUS; SUBCUTANEOUS at 03:57

## 2020-01-25 RX ADMIN — IPRATROPIUM BROMIDE AND ALBUTEROL SULFATE 1 AMPULE: .5; 3 SOLUTION RESPIRATORY (INHALATION) at 16:25

## 2020-01-25 RX ADMIN — POTASSIUM CHLORIDE 40 MEQ: 1500 TABLET, EXTENDED RELEASE ORAL at 12:36

## 2020-01-25 RX ADMIN — POTASSIUM CHLORIDE 40 MEQ: 1500 TABLET, EXTENDED RELEASE ORAL at 19:13

## 2020-01-25 RX ADMIN — MEROPENEM 2 G: 1 INJECTION, POWDER, FOR SOLUTION INTRAVENOUS at 03:56

## 2020-01-25 RX ADMIN — Medication 400 MG: at 08:37

## 2020-01-25 RX ADMIN — MEROPENEM 2 G: 1 INJECTION, POWDER, FOR SOLUTION INTRAVENOUS at 19:13

## 2020-01-25 RX ADMIN — POTASSIUM CHLORIDE 40 MEQ: 1500 TABLET, EXTENDED RELEASE ORAL at 08:37

## 2020-01-25 RX ADMIN — VANCOMYCIN HYDROCHLORIDE 1250 MG: 5 INJECTION, POWDER, LYOPHILIZED, FOR SOLUTION INTRAVENOUS at 03:57

## 2020-01-25 RX ADMIN — VANCOMYCIN HYDROCHLORIDE 1250 MG: 5 INJECTION, POWDER, LYOPHILIZED, FOR SOLUTION INTRAVENOUS at 17:05

## 2020-01-25 RX ADMIN — HYDROMORPHONE HYDROCHLORIDE 1 MG: 1 INJECTION, SOLUTION INTRAMUSCULAR; INTRAVENOUS; SUBCUTANEOUS at 08:45

## 2020-01-25 ASSESSMENT — PAIN SCALES - GENERAL
PAINLEVEL_OUTOF10: 10
PAINLEVEL_OUTOF10: 2
PAINLEVEL_OUTOF10: 4
PAINLEVEL_OUTOF10: 10
PAINLEVEL_OUTOF10: 9
PAINLEVEL_OUTOF10: 10
PAINLEVEL_OUTOF10: 5
PAINLEVEL_OUTOF10: 10
PAINLEVEL_OUTOF10: 9
PAINLEVEL_OUTOF10: 10
PAINLEVEL_OUTOF10: 9
PAINLEVEL_OUTOF10: 5
PAINLEVEL_OUTOF10: 4

## 2020-01-25 ASSESSMENT — PAIN DESCRIPTION - LOCATION
LOCATION: ABDOMEN;BACK
LOCATION: ABDOMEN

## 2020-01-25 ASSESSMENT — PAIN DESCRIPTION - DESCRIPTORS
DESCRIPTORS: STABBING;SHARP
DESCRIPTORS: SHARP;STABBING

## 2020-01-25 ASSESSMENT — PAIN DESCRIPTION - ORIENTATION
ORIENTATION: MID
ORIENTATION: MID
ORIENTATION: MID;LOWER
ORIENTATION: MID

## 2020-01-25 ASSESSMENT — PAIN - FUNCTIONAL ASSESSMENT
PAIN_FUNCTIONAL_ASSESSMENT: PREVENTS OR INTERFERES SOME ACTIVE ACTIVITIES AND ADLS
PAIN_FUNCTIONAL_ASSESSMENT: ACTIVITIES ARE NOT PREVENTED
PAIN_FUNCTIONAL_ASSESSMENT: ACTIVITIES ARE NOT PREVENTED
PAIN_FUNCTIONAL_ASSESSMENT: PREVENTS OR INTERFERES SOME ACTIVE ACTIVITIES AND ADLS

## 2020-01-25 ASSESSMENT — PAIN DESCRIPTION - PROGRESSION
CLINICAL_PROGRESSION: GRADUALLY WORSENING
CLINICAL_PROGRESSION: NOT CHANGED
CLINICAL_PROGRESSION: GRADUALLY WORSENING
CLINICAL_PROGRESSION: NOT CHANGED

## 2020-01-25 ASSESSMENT — PAIN DESCRIPTION - ONSET
ONSET: ON-GOING

## 2020-01-25 ASSESSMENT — PAIN DESCRIPTION - PAIN TYPE
TYPE: SURGICAL PAIN

## 2020-01-25 ASSESSMENT — PAIN DESCRIPTION - FREQUENCY
FREQUENCY: CONTINUOUS

## 2020-01-25 NOTE — PROGRESS NOTES
Pt has remained alert and oriented. Pt is tolerating her diet. Pt is ambulating independently in the halls. Pt is taking pain meds per mar. Pt is very pleasant and cooperative today. Pt bathed per self today. Pt did receive a new iv per the picc team to left arm # 20 extra long catheter continues to infuse well without pain.

## 2020-01-25 NOTE — PROGRESS NOTES
Subjective:     Postop 2    The patient feels well. . Pain is moderately controlled with current medications. Urinary output is adequate. The patient is tolerating a normal diet. Flatus has been passed. Objective:    VITALS:  BP (!) 152/91   Pulse 76   Temp 98.2 °F (36.8 °C) (Oral)   Resp 14   Ht 5' 6\" (1.676 m)   Wt 107 lb (48.5 kg)   LMP 12/24/2019   SpO2 98%   BMI 17.27 kg/m²     Vitals:    01/25/20 1231   BP:    Pulse:    Resp: 14   Temp:    SpO2: 98%         General:    alert, appears stated age and cooperative       Abd Soft, tender, non distended       Incision:  Dressing clean and dry   DVT Evaluation:  No evidence of DVT seen on physical exam.  No edema     CBC   Lab Results   Component Value Date    WBC 7.6 01/25/2020    HGB 9.1 (L) 01/25/2020    HCT 30.2 (L) 01/25/2020    .6 (H) 01/25/2020     01/25/2020        Assessment:     Status post exp lap with LSO and treatment of abcess Doing well postoperatively. Some issues with IV acess. Will get pic line  Hypokalemia being replaced    Plan:     Continue current care.

## 2020-01-25 NOTE — PROGRESS NOTES
Multiple complaints regarding care and pain  VSS lungs clear Abdomen soft tender  JERRI serous fluid  Labs K=2.8 being replaced  Will adjust pain meds  OOB  Repeat K+ in am

## 2020-01-25 NOTE — PROGRESS NOTES
= values in this interval not displayed. Recent Labs     01/24/20  0545 01/24/20  1656 01/25/20  0426    137 141   K 3.0  CALLED TO SYDNIE MOJICA RN [id number] DON MLT  RESULTS READ BACK  * 3.4* 2.8  K CALLED TO Luz Ghotra RN AT 0521, [facility]  RESULTS READ BACK  *    97* 100   CO2 27 28 32   BUN 3* 3* 2*   CREATININE 0.6 0.6 0.6   CALCIUM 8.1* 8.9 8.1*     Recent Labs     01/22/20  1320 01/24/20  0545   AST 19 16   ALT 19 14   BILITOT 0.5 0.2   ALKPHOS 127 114     Recent Labs     01/23/20  0816   INR 0.98     No results for input(s): CKTOTAL, TROPONINI in the last 72 hours. Assessment/Plan:    Active Hospital Problems    Diagnosis Date Noted    Abdominal pain [R10.9] 01/21/2020     Priority: High    Left tubo-ovarian abscess [N70.93] 01/24/2020    Pelvic peritoneal adhesions, female (postoperative) (postinfection) [N73.6] 01/24/2020    Leukocytosis [D72.829] 01/21/2020    Nausea & vomiting [R11.2] 01/21/2020 1/23/20 LAPAROSCOPY EXPLORATORY -CONVERTED TO  OPEN LEFT SALPINGECTOMY AND OOPHORECTOMY AND DRAINAGE OF TUBE OVARIAN ABSCESS AND LYSIS OF ADHESIONS Moiz Rose MD           Assessment/Plan:   Tele - no event   IVF  kcl tid . ... monitor  k level   OB/GYN input noted . Jackelyn Liriano IV ABX , blood c/s: not done . . and surgical  c/s :noted   Pain control   Symptomatic treatment   Home meds   DVT proph   Diet: DIET FULL LIQUID;  Code Status: Prior    Treatment progress and plan was d/w pt/family .         Linda Mckeon MD

## 2020-01-26 LAB
CULTURE: NORMAL
CULTURE: NORMAL
DOSE AMOUNT: ABNORMAL
DOSE TIME: ABNORMAL
Lab: NORMAL
Lab: NORMAL
SPECIMEN: NORMAL
SPECIMEN: NORMAL
VANCOMYCIN TROUGH: 42.5 UG/ML (ref 10–20)

## 2020-01-26 PROCEDURE — 6370000000 HC RX 637 (ALT 250 FOR IP): Performed by: OBSTETRICS & GYNECOLOGY

## 2020-01-26 PROCEDURE — 6370000000 HC RX 637 (ALT 250 FOR IP): Performed by: FAMILY MEDICINE

## 2020-01-26 PROCEDURE — 6360000002 HC RX W HCPCS: Performed by: SURGERY

## 2020-01-26 PROCEDURE — 2500000003 HC RX 250 WO HCPCS: Performed by: SURGERY

## 2020-01-26 PROCEDURE — 94761 N-INVAS EAR/PLS OXIMETRY MLT: CPT

## 2020-01-26 PROCEDURE — 1200000000 HC SEMI PRIVATE

## 2020-01-26 PROCEDURE — 80202 ASSAY OF VANCOMYCIN: CPT

## 2020-01-26 PROCEDURE — 94640 AIRWAY INHALATION TREATMENT: CPT

## 2020-01-26 PROCEDURE — 6360000002 HC RX W HCPCS: Performed by: OBSTETRICS & GYNECOLOGY

## 2020-01-26 PROCEDURE — 36415 COLL VENOUS BLD VENIPUNCTURE: CPT

## 2020-01-26 PROCEDURE — 2580000003 HC RX 258: Performed by: OBSTETRICS & GYNECOLOGY

## 2020-01-26 RX ADMIN — MEROPENEM 2 G: 1 INJECTION, POWDER, FOR SOLUTION INTRAVENOUS at 11:44

## 2020-01-26 RX ADMIN — DOCUSATE SODIUM 100 MG: 100 CAPSULE, LIQUID FILLED ORAL at 10:22

## 2020-01-26 RX ADMIN — Medication 400 MG: at 10:22

## 2020-01-26 RX ADMIN — OXYCODONE HYDROCHLORIDE AND ACETAMINOPHEN 2 TABLET: 5; 325 TABLET ORAL at 02:47

## 2020-01-26 RX ADMIN — DOCUSATE SODIUM 100 MG: 100 CAPSULE, LIQUID FILLED ORAL at 19:53

## 2020-01-26 RX ADMIN — IPRATROPIUM BROMIDE AND ALBUTEROL SULFATE 1 AMPULE: .5; 3 SOLUTION RESPIRATORY (INHALATION) at 21:31

## 2020-01-26 RX ADMIN — IPRATROPIUM BROMIDE AND ALBUTEROL SULFATE 1 AMPULE: .5; 3 SOLUTION RESPIRATORY (INHALATION) at 08:58

## 2020-01-26 RX ADMIN — IPRATROPIUM BROMIDE AND ALBUTEROL SULFATE 1 AMPULE: .5; 3 SOLUTION RESPIRATORY (INHALATION) at 16:37

## 2020-01-26 RX ADMIN — POTASSIUM CHLORIDE, DEXTROSE MONOHYDRATE AND SODIUM CHLORIDE: 150; 5; 450 INJECTION, SOLUTION INTRAVENOUS at 14:02

## 2020-01-26 RX ADMIN — POTASSIUM CHLORIDE, DEXTROSE MONOHYDRATE AND SODIUM CHLORIDE: 150; 5; 450 INJECTION, SOLUTION INTRAVENOUS at 02:39

## 2020-01-26 RX ADMIN — POTASSIUM CHLORIDE, DEXTROSE MONOHYDRATE AND SODIUM CHLORIDE: 150; 5; 450 INJECTION, SOLUTION INTRAVENOUS at 22:48

## 2020-01-26 RX ADMIN — VANCOMYCIN HYDROCHLORIDE 1250 MG: 5 INJECTION, POWDER, LYOPHILIZED, FOR SOLUTION INTRAVENOUS at 14:02

## 2020-01-26 RX ADMIN — OXYCODONE HYDROCHLORIDE AND ACETAMINOPHEN 2 TABLET: 5; 325 TABLET ORAL at 16:04

## 2020-01-26 RX ADMIN — OXYCODONE HYDROCHLORIDE AND ACETAMINOPHEN 2 TABLET: 5; 325 TABLET ORAL at 10:22

## 2020-01-26 RX ADMIN — Medication 400 MG: at 19:53

## 2020-01-26 RX ADMIN — GUAIFENESIN 1200 MG: 600 TABLET, EXTENDED RELEASE ORAL at 19:53

## 2020-01-26 RX ADMIN — POTASSIUM CHLORIDE 40 MEQ: 1500 TABLET, EXTENDED RELEASE ORAL at 10:22

## 2020-01-26 RX ADMIN — OXYCODONE HYDROCHLORIDE AND ACETAMINOPHEN 2 TABLET: 5; 325 TABLET ORAL at 20:01

## 2020-01-26 RX ADMIN — VANCOMYCIN HYDROCHLORIDE 1250 MG: 5 INJECTION, POWDER, LYOPHILIZED, FOR SOLUTION INTRAVENOUS at 03:29

## 2020-01-26 RX ADMIN — POTASSIUM CHLORIDE 40 MEQ: 1500 TABLET, EXTENDED RELEASE ORAL at 16:05

## 2020-01-26 RX ADMIN — GUAIFENESIN 1200 MG: 600 TABLET, EXTENDED RELEASE ORAL at 10:22

## 2020-01-26 RX ADMIN — MEROPENEM 2 G: 1 INJECTION, POWDER, FOR SOLUTION INTRAVENOUS at 02:42

## 2020-01-26 RX ADMIN — HYDROMORPHONE HYDROCHLORIDE 1 MG: 1 INJECTION, SOLUTION INTRAMUSCULAR; INTRAVENOUS; SUBCUTANEOUS at 07:00

## 2020-01-26 RX ADMIN — MEROPENEM 2 G: 1 INJECTION, POWDER, FOR SOLUTION INTRAVENOUS at 19:42

## 2020-01-26 RX ADMIN — POTASSIUM BICARBONATE 40 MEQ: 782 TABLET, EFFERVESCENT ORAL at 19:53

## 2020-01-26 ASSESSMENT — PAIN SCALES - GENERAL
PAINLEVEL_OUTOF10: 10
PAINLEVEL_OUTOF10: 8
PAINLEVEL_OUTOF10: 10
PAINLEVEL_OUTOF10: 8
PAINLEVEL_OUTOF10: 10
PAINLEVEL_OUTOF10: 8
PAINLEVEL_OUTOF10: 8

## 2020-01-26 ASSESSMENT — PAIN - FUNCTIONAL ASSESSMENT: PAIN_FUNCTIONAL_ASSESSMENT: ACTIVITIES ARE NOT PREVENTED

## 2020-01-26 ASSESSMENT — PAIN DESCRIPTION - PAIN TYPE
TYPE: SURGICAL PAIN;ACUTE PAIN
TYPE: SURGICAL PAIN

## 2020-01-26 ASSESSMENT — PAIN DESCRIPTION - FREQUENCY
FREQUENCY: CONTINUOUS
FREQUENCY: INTERMITTENT

## 2020-01-26 ASSESSMENT — PAIN DESCRIPTION - PROGRESSION
CLINICAL_PROGRESSION: NOT CHANGED

## 2020-01-26 ASSESSMENT — PAIN DESCRIPTION - DIRECTION: RADIATING_TOWARDS: SIDE

## 2020-01-26 ASSESSMENT — PAIN DESCRIPTION - ONSET: ONSET: ON-GOING

## 2020-01-26 ASSESSMENT — PAIN DESCRIPTION - LOCATION
LOCATION: BACK
LOCATION: ABDOMEN;BACK
LOCATION: ABDOMEN;BACK

## 2020-01-26 ASSESSMENT — PAIN DESCRIPTION - DESCRIPTORS: DESCRIPTORS: STABBING;THROBBING

## 2020-01-26 ASSESSMENT — PAIN DESCRIPTION - ORIENTATION: ORIENTATION: MID;LOWER

## 2020-01-26 NOTE — PROGRESS NOTES
Doing well from general surgery standpoint  Feeling better  Dr. Ramirez Serve back Monday  Discharge per OB GYN  Continue IV antibiotics

## 2020-01-27 LAB
ANION GAP SERPL CALCULATED.3IONS-SCNC: 10 MMOL/L (ref 4–16)
BUN BLDV-MCNC: 3 MG/DL (ref 6–23)
CALCIUM SERPL-MCNC: 8.9 MG/DL (ref 8.3–10.6)
CHLORIDE BLD-SCNC: 98 MMOL/L (ref 99–110)
CO2: 30 MMOL/L (ref 21–32)
CREAT SERPL-MCNC: 0.7 MG/DL (ref 0.6–1.1)
CULTURE: NORMAL
CULTURE: NORMAL
DOSE AMOUNT: NORMAL
DOSE TIME: NORMAL
GFR AFRICAN AMERICAN: >60 ML/MIN/1.73M2
GFR NON-AFRICAN AMERICAN: >60 ML/MIN/1.73M2
GLUCOSE BLD-MCNC: 94 MG/DL (ref 70–99)
GRAM SMEAR: NORMAL
HCT VFR BLD CALC: 40.7 % (ref 37–47)
HEMOGLOBIN: 12.1 GM/DL (ref 12.5–16)
Lab: NORMAL
MCH RBC QN AUTO: 31.1 PG (ref 27–31)
MCHC RBC AUTO-ENTMCNC: 29.7 % (ref 32–36)
MCV RBC AUTO: 104.6 FL (ref 78–100)
PDW BLD-RTO: 13 % (ref 11.7–14.9)
PLATELET # BLD: 378 K/CU MM (ref 140–440)
PMV BLD AUTO: 10.1 FL (ref 7.5–11.1)
POTASSIUM SERPL-SCNC: 4.2 MMOL/L (ref 3.5–5.1)
RBC # BLD: 3.89 M/CU MM (ref 4.2–5.4)
SODIUM BLD-SCNC: 138 MMOL/L (ref 135–145)
SPECIMEN: NORMAL
VANCOMYCIN TROUGH: 14.6 UG/ML (ref 10–20)
WBC # BLD: 7.3 K/CU MM (ref 4–10.5)

## 2020-01-27 PROCEDURE — 76937 US GUIDE VASCULAR ACCESS: CPT

## 2020-01-27 PROCEDURE — 80048 BASIC METABOLIC PNL TOTAL CA: CPT

## 2020-01-27 PROCEDURE — 6370000000 HC RX 637 (ALT 250 FOR IP): Performed by: FAMILY MEDICINE

## 2020-01-27 PROCEDURE — 2580000003 HC RX 258: Performed by: OBSTETRICS & GYNECOLOGY

## 2020-01-27 PROCEDURE — 2500000003 HC RX 250 WO HCPCS: Performed by: SURGERY

## 2020-01-27 PROCEDURE — 94640 AIRWAY INHALATION TREATMENT: CPT

## 2020-01-27 PROCEDURE — 6370000000 HC RX 637 (ALT 250 FOR IP): Performed by: OBSTETRICS & GYNECOLOGY

## 2020-01-27 PROCEDURE — 85027 COMPLETE CBC AUTOMATED: CPT

## 2020-01-27 PROCEDURE — 1200000000 HC SEMI PRIVATE

## 2020-01-27 PROCEDURE — 80202 ASSAY OF VANCOMYCIN: CPT

## 2020-01-27 PROCEDURE — 6360000002 HC RX W HCPCS: Performed by: OBSTETRICS & GYNECOLOGY

## 2020-01-27 PROCEDURE — 6370000000 HC RX 637 (ALT 250 FOR IP): Performed by: SURGERY

## 2020-01-27 PROCEDURE — 36415 COLL VENOUS BLD VENIPUNCTURE: CPT

## 2020-01-27 RX ORDER — AMOXICILLIN AND CLAVULANATE POTASSIUM 875; 125 MG/1; MG/1
1 TABLET, FILM COATED ORAL EVERY 12 HOURS SCHEDULED
Status: DISCONTINUED | OUTPATIENT
Start: 2020-01-27 | End: 2020-01-30 | Stop reason: HOSPADM

## 2020-01-27 RX ORDER — LIDOCAINE HYDROCHLORIDE 10 MG/ML
5 INJECTION, SOLUTION EPIDURAL; INFILTRATION; INTRACAUDAL; PERINEURAL ONCE
Status: DISCONTINUED | OUTPATIENT
Start: 2020-01-27 | End: 2020-01-30 | Stop reason: HOSPADM

## 2020-01-27 RX ORDER — SODIUM CHLORIDE 0.9 % (FLUSH) 0.9 %
10 SYRINGE (ML) INJECTION EVERY 12 HOURS SCHEDULED
Status: DISCONTINUED | OUTPATIENT
Start: 2020-01-27 | End: 2020-01-30 | Stop reason: HOSPADM

## 2020-01-27 RX ORDER — SODIUM CHLORIDE 0.9 % (FLUSH) 0.9 %
10 SYRINGE (ML) INJECTION PRN
Status: DISCONTINUED | OUTPATIENT
Start: 2020-01-27 | End: 2020-01-27 | Stop reason: SDUPTHER

## 2020-01-27 RX ADMIN — AMOXICILLIN AND CLAVULANATE POTASSIUM 1 TABLET: 875; 125 TABLET, FILM COATED ORAL at 20:29

## 2020-01-27 RX ADMIN — IPRATROPIUM BROMIDE AND ALBUTEROL SULFATE 1 AMPULE: .5; 3 SOLUTION RESPIRATORY (INHALATION) at 16:06

## 2020-01-27 RX ADMIN — GUAIFENESIN 1200 MG: 600 TABLET, EXTENDED RELEASE ORAL at 09:00

## 2020-01-27 RX ADMIN — GUAIFENESIN 1200 MG: 600 TABLET, EXTENDED RELEASE ORAL at 20:29

## 2020-01-27 RX ADMIN — HYDROMORPHONE HYDROCHLORIDE 1 MG: 1 INJECTION, SOLUTION INTRAMUSCULAR; INTRAVENOUS; SUBCUTANEOUS at 02:10

## 2020-01-27 RX ADMIN — OXYCODONE HYDROCHLORIDE AND ACETAMINOPHEN 2 TABLET: 5; 325 TABLET ORAL at 03:14

## 2020-01-27 RX ADMIN — OXYCODONE HYDROCHLORIDE AND ACETAMINOPHEN 2 TABLET: 5; 325 TABLET ORAL at 14:03

## 2020-01-27 RX ADMIN — HYDROMORPHONE HYDROCHLORIDE 1 MG: 1 INJECTION, SOLUTION INTRAMUSCULAR; INTRAVENOUS; SUBCUTANEOUS at 22:21

## 2020-01-27 RX ADMIN — Medication 400 MG: at 20:29

## 2020-01-27 RX ADMIN — Medication 400 MG: at 09:00

## 2020-01-27 RX ADMIN — MEROPENEM 2 G: 1 INJECTION, POWDER, FOR SOLUTION INTRAVENOUS at 12:11

## 2020-01-27 RX ADMIN — DOCUSATE SODIUM 100 MG: 100 CAPSULE, LIQUID FILLED ORAL at 20:29

## 2020-01-27 RX ADMIN — IPRATROPIUM BROMIDE AND ALBUTEROL SULFATE 1 AMPULE: .5; 3 SOLUTION RESPIRATORY (INHALATION) at 12:13

## 2020-01-27 RX ADMIN — OXYCODONE HYDROCHLORIDE AND ACETAMINOPHEN 2 TABLET: 5; 325 TABLET ORAL at 07:54

## 2020-01-27 RX ADMIN — DOCUSATE SODIUM 100 MG: 100 CAPSULE, LIQUID FILLED ORAL at 09:00

## 2020-01-27 RX ADMIN — OXYCODONE HYDROCHLORIDE AND ACETAMINOPHEN 2 TABLET: 5; 325 TABLET ORAL at 18:40

## 2020-01-27 RX ADMIN — MEROPENEM 2 G: 1 INJECTION, POWDER, FOR SOLUTION INTRAVENOUS at 03:15

## 2020-01-27 RX ADMIN — VANCOMYCIN HYDROCHLORIDE 1250 MG: 5 INJECTION, POWDER, LYOPHILIZED, FOR SOLUTION INTRAVENOUS at 04:30

## 2020-01-27 ASSESSMENT — PAIN DESCRIPTION - PROGRESSION
CLINICAL_PROGRESSION: NOT CHANGED
CLINICAL_PROGRESSION: GRADUALLY WORSENING
CLINICAL_PROGRESSION: NOT CHANGED
CLINICAL_PROGRESSION: NOT CHANGED
CLINICAL_PROGRESSION: GRADUALLY WORSENING
CLINICAL_PROGRESSION: NOT CHANGED

## 2020-01-27 ASSESSMENT — PAIN DESCRIPTION - ONSET
ONSET: ON-GOING

## 2020-01-27 ASSESSMENT — PAIN DESCRIPTION - PAIN TYPE
TYPE: SURGICAL PAIN
TYPE: SURGICAL PAIN;ACUTE PAIN

## 2020-01-27 ASSESSMENT — PAIN DESCRIPTION - LOCATION
LOCATION: ABDOMEN
LOCATION: ABDOMEN
LOCATION: ABDOMEN;ARM
LOCATION: ABDOMEN
LOCATION: ARM
LOCATION: ABDOMEN
LOCATION: ABDOMEN

## 2020-01-27 ASSESSMENT — PAIN SCALES - GENERAL
PAINLEVEL_OUTOF10: 10
PAINLEVEL_OUTOF10: 9
PAINLEVEL_OUTOF10: 10
PAINLEVEL_OUTOF10: 9
PAINLEVEL_OUTOF10: 9
PAINLEVEL_OUTOF10: 10
PAINLEVEL_OUTOF10: 10
PAINLEVEL_OUTOF10: 9
PAINLEVEL_OUTOF10: 7
PAINLEVEL_OUTOF10: 9
PAINLEVEL_OUTOF10: 10
PAINLEVEL_OUTOF10: 7
PAINLEVEL_OUTOF10: 9
PAINLEVEL_OUTOF10: 10
PAINLEVEL_OUTOF10: 10

## 2020-01-27 ASSESSMENT — PAIN - FUNCTIONAL ASSESSMENT
PAIN_FUNCTIONAL_ASSESSMENT: ACTIVITIES ARE NOT PREVENTED

## 2020-01-27 ASSESSMENT — PAIN DESCRIPTION - ORIENTATION
ORIENTATION: MID;LOWER
ORIENTATION: LOWER
ORIENTATION: MID;LOWER
ORIENTATION: MID;LOWER

## 2020-01-27 ASSESSMENT — PAIN DESCRIPTION - FREQUENCY
FREQUENCY: CONTINUOUS

## 2020-01-27 ASSESSMENT — PAIN DESCRIPTION - DESCRIPTORS
DESCRIPTORS: THROBBING
DESCRIPTORS: THROBBING
DESCRIPTORS: ACHING;THROBBING
DESCRIPTORS: THROBBING;STABBING;ACHING
DESCRIPTORS: CRAMPING
DESCRIPTORS: THROBBING
DESCRIPTORS: THROBBING

## 2020-01-27 NOTE — PROGRESS NOTES
Attending Progress Note      PCP: Alfred Toledo MD    Patient: Melchor Goldsmith   Gender: female  : 1975   Age: 40 y.o. MRN: [id number]      Date of Admission: 2020    Chief Complaint:   Chief Complaint   Patient presents with    Abdominal Pain    Back Pain    Nausea           Subjective:  abd pain ,      Medications:  Reviewed  Infusion Medications    dextrose 5% and 0.45% NaCl with KCl 20 mEq 100 mL/hr at 20 2248     Scheduled Medications    potassium bicarb-citric acid  40 mEq Oral TID    magnesium oxide  400 mg Oral BID    ipratropium-albuterol  1 ampule Inhalation Q4H WA    guaiFENesin  1,200 mg Oral BID    vancomycin  1,250 mg Intravenous Q12H    sodium chloride flush  10 mL Intravenous 2 times per day    docusate sodium  100 mg Oral BID    enoxaparin  30 mg Subcutaneous Daily    meropenem  2 g Intravenous Q8H     PRN Meds: HYDROmorphone **OR** HYDROmorphone, calcium carbonate, guaiFENesin-dextromethorphan, sodium chloride flush, acetaminophen, ondansetron, oxyCODONE-acetaminophen **OR** oxyCODONE-acetaminophen      Intake/Output Summary (Last 24 hours) at 2020 0115  Last data filed at 2020 2257  Gross per 24 hour   Intake 1775 ml   Output 1510 ml   Net 265 ml       Exam:  /87   Pulse 87   Temp 98.2 °F (36.8 °C) (Oral)   Resp 16   Ht 5' 6\" (1.676 m)   Wt 107 lb (48.5 kg)   LMP 2019   SpO2 98%   BMI 17.27 kg/m²   General appearance: mild to moderate  distress,   Respiratory:  good air entry , no Rales , No wheezing, or rhonchi,  Cardiovascular: RRR, with normal S1/S2 without murmurs. Abdomen : Soft, tender, non-distended  , normal bowel sounds. Legs : No edema bilaterally.  No DVT signs ,    Neurologic:  Alert and oriented ,        Labs:   Recent Labs     20  0545 20  0426   WBC 14.3* 7.6   HGB 9.0* 9.1*   HCT 28.8* 30.2*    239     Recent Labs     20  1656 20  0426 20  1242    141 139   K 3.4* 2.8  K CALLED TO Rajeev Jorgensen RN AT 0521, ALFIE MLS  RESULTS READ BACK  * 3.3*   CL 97* 100 98*   CO2 28 32 32   BUN 3* 2* 2*   CREATININE 0.6 0.6 0.5*   CALCIUM 8.9 8.1* 8.4     Recent Labs     01/24/20  0545   AST 16   ALT 14   BILITOT 0.2   ALKPHOS 114     No results for input(s): INR in the last 72 hours. No results for input(s): Dipesh Shall in the last 72 hours. Assessment/Plan:    Active Hospital Problems    Diagnosis Date Noted    Abdominal pain [R10.9] 01/21/2020     Priority: High    Left tubo-ovarian abscess [N70.93] 01/24/2020    Pelvic peritoneal adhesions, female (postoperative) (postinfection) [N73.6] 01/24/2020    Leukocytosis [D72.829] 01/21/2020    Nausea & vomiting [R11.2] 01/21/2020 1/23/20 LAPAROSCOPY EXPLORATORY -CONVERTED TO  OPEN LEFT SALPINGECTOMY AND OOPHORECTOMY AND DRAINAGE OF TUBE OVARIAN ABSCESS AND LYSIS OF ADHESIONS Shawn Karis Severin, MD           Assessment/Plan:   Tele - no event   IVF  kcl tid . ... monitor  k level   OB/GYN input noted . Li Campos IV ABX , blood c/s: not done . . and surgical  c/s :noted   Pain control   Symptomatic treatment   Home meds   DVT proph   Diet: DIET GENERAL;  Code Status: Prior    Treatment progress and plan was d/w pt/family .         Antonia Leyva MD

## 2020-01-27 NOTE — PROGRESS NOTES
01/25/20  1242 01/27/20  0534    139 138   K 2.8  K CALLED TO MAYA VELEZ RN AT 0521, ALFIE [facility]  RESULTS READ BACK  * 3.3* 4.2    98* 98*   CO2 32 32 30   BUN 2* 2* 3*   CREATININE 0.6 0.5* 0.7   CALCIUM 8.1* 8.4 8.9     No results for input(s): AST, ALT, BILIDIR, BILITOT, ALKPHOS in the last 72 hours. No results for input(s): INR in the last 72 hours. No results for input(s): Lolly Dallin in the last 72 hours. Assessment/Plan:    Active Hospital Problems    Diagnosis Date Noted    Abdominal pain [R10.9] 01/21/2020     Priority: High    Left tubo-ovarian abscess [N70.93] 01/24/2020    Pelvic peritoneal adhesions, female (postoperative) (postinfection) [N73.6] 01/24/2020    Leukocytosis [D72.829] 01/21/2020    Nausea & vomiting [R11.2] 01/21/2020 1/23/20 LAPAROSCOPY EXPLORATORY -CONVERTED TO  OPEN LEFT SALPINGECTOMY AND OOPHORECTOMY AND DRAINAGE OF TUBE OVARIAN ABSCESS AND LYSIS OF ADHESIONS Moiz Ellison MD           Assessment/Plan:   Tele - no event   IVF  kcl tid . ... monitor  k level   OB/GYN input noted . [place] Amy IV ABX , blood c/s: not done . . and surgical  c/s :noted   Pain control   Symptomatic treatment   Home meds   DVT proph   Diet: DIET GENERAL;  Code Status: Prior    Treatment progress and plan was d/w pt/family .         Nahid Nair MD

## 2020-01-27 NOTE — PROGRESS NOTES
POD 4 exp lap with KAE and left salpingo-oophorectomy. AF VSS. Notes from the weekend reviewed. She is doing better. Reassured her that this is a normal postoperative course. Good UOP. JERRI drain is mostly serous. Surgical cx is polymicrobial.  abd is soft, drsg c/d/i. JERRI intact and draining well. Continue with iv abx's, f/u in cx's. Mobilize. Await for return of GI function.  Passing flatus and on regular diet, but no BM yet.

## 2020-01-28 PROBLEM — E43 SEVERE MALNUTRITION (HCC): Chronic | Status: ACTIVE | Noted: 2020-01-28

## 2020-01-28 LAB
BACTERIA: NEGATIVE /HPF
BILIRUBIN URINE: NEGATIVE MG/DL
BLOOD, URINE: NEGATIVE
CLARITY: ABNORMAL
COLOR: YELLOW
GLUCOSE, URINE: NEGATIVE MG/DL
KETONES, URINE: NEGATIVE MG/DL
LEUKOCYTE ESTERASE, URINE: NEGATIVE
NITRITE URINE, QUANTITATIVE: NEGATIVE
PH, URINE: 9 (ref 5–8)
PROTEIN UA: 30 MG/DL
RBC URINE: 1 /HPF (ref 0–6)
SPECIFIC GRAVITY UA: 1.01 (ref 1–1.03)
SQUAMOUS EPITHELIAL: 1 /HPF
TRICHOMONAS: ABNORMAL /HPF
UROBILINOGEN, URINE: NORMAL MG/DL (ref 0.2–1)
WBC UA: 1 /HPF (ref 0–5)

## 2020-01-28 PROCEDURE — 6370000000 HC RX 637 (ALT 250 FOR IP): Performed by: FAMILY MEDICINE

## 2020-01-28 PROCEDURE — 85027 COMPLETE CBC AUTOMATED: CPT

## 2020-01-28 PROCEDURE — 2500000003 HC RX 250 WO HCPCS: Performed by: SURGERY

## 2020-01-28 PROCEDURE — 1200000000 HC SEMI PRIVATE

## 2020-01-28 PROCEDURE — 94761 N-INVAS EAR/PLS OXIMETRY MLT: CPT

## 2020-01-28 PROCEDURE — 2580000003 HC RX 258: Performed by: SURGERY

## 2020-01-28 PROCEDURE — 87086 URINE CULTURE/COLONY COUNT: CPT

## 2020-01-28 PROCEDURE — 6370000000 HC RX 637 (ALT 250 FOR IP): Performed by: OBSTETRICS & GYNECOLOGY

## 2020-01-28 PROCEDURE — 94640 AIRWAY INHALATION TREATMENT: CPT

## 2020-01-28 PROCEDURE — 80048 BASIC METABOLIC PNL TOTAL CA: CPT

## 2020-01-28 PROCEDURE — 6370000000 HC RX 637 (ALT 250 FOR IP): Performed by: SURGERY

## 2020-01-28 PROCEDURE — 2580000003 HC RX 258: Performed by: OBSTETRICS & GYNECOLOGY

## 2020-01-28 PROCEDURE — 81001 URINALYSIS AUTO W/SCOPE: CPT

## 2020-01-28 RX ADMIN — HYDROMORPHONE HYDROCHLORIDE 1 MG: 1 INJECTION, SOLUTION INTRAMUSCULAR; INTRAVENOUS; SUBCUTANEOUS at 18:20

## 2020-01-28 RX ADMIN — OXYCODONE HYDROCHLORIDE AND ACETAMINOPHEN 2 TABLET: 5; 325 TABLET ORAL at 05:18

## 2020-01-28 RX ADMIN — IPRATROPIUM BROMIDE AND ALBUTEROL SULFATE 1 AMPULE: .5; 3 SOLUTION RESPIRATORY (INHALATION) at 08:03

## 2020-01-28 RX ADMIN — DOCUSATE SODIUM 100 MG: 100 CAPSULE, LIQUID FILLED ORAL at 21:04

## 2020-01-28 RX ADMIN — MAGNESIUM HYDROXIDE 30 ML: 400 SUSPENSION ORAL at 09:23

## 2020-01-28 RX ADMIN — AMOXICILLIN AND CLAVULANATE POTASSIUM 1 TABLET: 875; 125 TABLET, FILM COATED ORAL at 21:04

## 2020-01-28 RX ADMIN — Medication 400 MG: at 21:04

## 2020-01-28 RX ADMIN — OXYCODONE HYDROCHLORIDE AND ACETAMINOPHEN 2 TABLET: 5; 325 TABLET ORAL at 22:14

## 2020-01-28 RX ADMIN — DOCUSATE SODIUM 100 MG: 100 CAPSULE, LIQUID FILLED ORAL at 09:24

## 2020-01-28 RX ADMIN — GUAIFENESIN 1200 MG: 600 TABLET, EXTENDED RELEASE ORAL at 09:24

## 2020-01-28 RX ADMIN — CALCIUM CARBONATE 500 MG: 500 TABLET, CHEWABLE ORAL at 15:39

## 2020-01-28 RX ADMIN — SODIUM CHLORIDE, PRESERVATIVE FREE 10 ML: 5 INJECTION INTRAVENOUS at 21:00

## 2020-01-28 RX ADMIN — GUAIFENESIN 1200 MG: 600 TABLET, EXTENDED RELEASE ORAL at 21:04

## 2020-01-28 RX ADMIN — AMOXICILLIN AND CLAVULANATE POTASSIUM 1 TABLET: 875; 125 TABLET, FILM COATED ORAL at 09:24

## 2020-01-28 RX ADMIN — Medication 400 MG: at 09:24

## 2020-01-28 RX ADMIN — OXYCODONE HYDROCHLORIDE AND ACETAMINOPHEN 2 TABLET: 5; 325 TABLET ORAL at 11:26

## 2020-01-28 RX ADMIN — SODIUM CHLORIDE, PRESERVATIVE FREE 10 ML: 5 INJECTION INTRAVENOUS at 09:24

## 2020-01-28 RX ADMIN — OXYCODONE HYDROCHLORIDE AND ACETAMINOPHEN 2 TABLET: 5; 325 TABLET ORAL at 00:08

## 2020-01-28 RX ADMIN — SODIUM CHLORIDE, PRESERVATIVE FREE 10 ML: 5 INJECTION INTRAVENOUS at 21:05

## 2020-01-28 RX ADMIN — OXYCODONE HYDROCHLORIDE AND ACETAMINOPHEN 2 TABLET: 5; 325 TABLET ORAL at 15:37

## 2020-01-28 ASSESSMENT — PAIN DESCRIPTION - LOCATION
LOCATION: ABDOMEN
LOCATION: ABDOMEN

## 2020-01-28 ASSESSMENT — PAIN SCALES - GENERAL
PAINLEVEL_OUTOF10: 9
PAINLEVEL_OUTOF10: 10
PAINLEVEL_OUTOF10: 3
PAINLEVEL_OUTOF10: 10
PAINLEVEL_OUTOF10: 7
PAINLEVEL_OUTOF10: 0
PAINLEVEL_OUTOF10: 2
PAINLEVEL_OUTOF10: 10
PAINLEVEL_OUTOF10: 10
PAINLEVEL_OUTOF10: 9

## 2020-01-28 ASSESSMENT — PAIN DESCRIPTION - PAIN TYPE
TYPE: SURGICAL PAIN
TYPE: SURGICAL PAIN

## 2020-01-28 ASSESSMENT — PAIN DESCRIPTION - PROGRESSION: CLINICAL_PROGRESSION: NOT CHANGED

## 2020-01-28 ASSESSMENT — PAIN DESCRIPTION - ORIENTATION: ORIENTATION: LOWER

## 2020-01-28 NOTE — PROGRESS NOTES
Nutrition Supplement (ONS) Orders: None  · Anthropometric Measures:  · Ht: 5' 6\" (167.6 cm)   · Current Body Wt: 107 lb (48.5 kg)(stated)  · Admission Body Wt: 107 lb (48.5 kg)  · Usual Body Wt: 120 lb (54.4 kg)(stated)  · % Weight Change: INDIRA  · Ideal Body Wt: 130 lb (59 kg), % Ideal Body 82%  · BMI Classification: BMI <18.5 Underweight    Nutrition Interventions:   Continue current diet, Start ONS  Continued Inpatient Monitoring, Education Not Indicated, Coordination of Care    Nutrition Evaluation:   · Evaluation: Goals set   · Goals: pt will consume greater than 75% of her meals and supplements    · Monitoring: Meal Intake, Supplement Intake, Pertinent Labs, Weight      Electronically signed by Mikki Olszewski, RD, LD on 0/48/83 at 3:54 PM    Contact Number: [phone]

## 2020-01-28 NOTE — PROGRESS NOTES
POD 5 exp lap with KAE and left salpingo-oophorectomy. AF VSS. She is doing better. Poor IV access, changed abx's to augmentin. IV did infiltrate in left forearm, elevate and monitor. Good UOP. JERRI drain is serous. Surgical cx is polymicrobial.  abd is soft, drsg c/d/i. JERRI intact and draining well. Will give some MOM. Await for return of GI function.

## 2020-01-29 PROCEDURE — 94761 N-INVAS EAR/PLS OXIMETRY MLT: CPT

## 2020-01-29 PROCEDURE — 6370000000 HC RX 637 (ALT 250 FOR IP): Performed by: SURGERY

## 2020-01-29 PROCEDURE — 6370000000 HC RX 637 (ALT 250 FOR IP): Performed by: FAMILY MEDICINE

## 2020-01-29 PROCEDURE — 2580000003 HC RX 258: Performed by: SURGERY

## 2020-01-29 PROCEDURE — 6370000000 HC RX 637 (ALT 250 FOR IP): Performed by: OBSTETRICS & GYNECOLOGY

## 2020-01-29 PROCEDURE — 1200000000 HC SEMI PRIVATE

## 2020-01-29 PROCEDURE — 94640 AIRWAY INHALATION TREATMENT: CPT

## 2020-01-29 RX ORDER — BISACODYL 10 MG
10 SUPPOSITORY, RECTAL RECTAL ONCE
Status: COMPLETED | OUTPATIENT
Start: 2020-01-29 | End: 2020-01-29

## 2020-01-29 RX ADMIN — AMOXICILLIN AND CLAVULANATE POTASSIUM 1 TABLET: 875; 125 TABLET, FILM COATED ORAL at 08:13

## 2020-01-29 RX ADMIN — OXYCODONE HYDROCHLORIDE AND ACETAMINOPHEN 2 TABLET: 5; 325 TABLET ORAL at 03:00

## 2020-01-29 RX ADMIN — DOCUSATE SODIUM 100 MG: 100 CAPSULE, LIQUID FILLED ORAL at 08:13

## 2020-01-29 RX ADMIN — OXYCODONE HYDROCHLORIDE AND ACETAMINOPHEN 2 TABLET: 5; 325 TABLET ORAL at 22:07

## 2020-01-29 RX ADMIN — GUAIFENESIN 1200 MG: 600 TABLET, EXTENDED RELEASE ORAL at 08:13

## 2020-01-29 RX ADMIN — BISACODYL 10 MG: 10 SUPPOSITORY RECTAL at 18:54

## 2020-01-29 RX ADMIN — AMOXICILLIN AND CLAVULANATE POTASSIUM 1 TABLET: 875; 125 TABLET, FILM COATED ORAL at 21:12

## 2020-01-29 RX ADMIN — Medication 400 MG: at 08:13

## 2020-01-29 RX ADMIN — OXYCODONE HYDROCHLORIDE AND ACETAMINOPHEN 2 TABLET: 5; 325 TABLET ORAL at 08:13

## 2020-01-29 RX ADMIN — SODIUM CHLORIDE, PRESERVATIVE FREE 10 ML: 5 INJECTION INTRAVENOUS at 21:16

## 2020-01-29 RX ADMIN — GUAIFENESIN 1200 MG: 600 TABLET, EXTENDED RELEASE ORAL at 21:12

## 2020-01-29 RX ADMIN — OXYCODONE HYDROCHLORIDE AND ACETAMINOPHEN 2 TABLET: 5; 325 TABLET ORAL at 18:02

## 2020-01-29 RX ADMIN — SODIUM CHLORIDE, PRESERVATIVE FREE 10 ML: 5 INJECTION INTRAVENOUS at 08:14

## 2020-01-29 RX ADMIN — IPRATROPIUM BROMIDE AND ALBUTEROL SULFATE 1 AMPULE: .5; 3 SOLUTION RESPIRATORY (INHALATION) at 07:50

## 2020-01-29 RX ADMIN — OXYCODONE HYDROCHLORIDE AND ACETAMINOPHEN 2 TABLET: 5; 325 TABLET ORAL at 12:58

## 2020-01-29 RX ADMIN — DOCUSATE SODIUM 100 MG: 100 CAPSULE, LIQUID FILLED ORAL at 21:13

## 2020-01-29 RX ADMIN — IPRATROPIUM BROMIDE AND ALBUTEROL SULFATE 1 AMPULE: .5; 3 SOLUTION RESPIRATORY (INHALATION) at 15:59

## 2020-01-29 RX ADMIN — Medication 400 MG: at 21:14

## 2020-01-29 ASSESSMENT — PAIN DESCRIPTION - FREQUENCY
FREQUENCY: CONTINUOUS

## 2020-01-29 ASSESSMENT — PAIN SCALES - GENERAL
PAINLEVEL_OUTOF10: 10
PAINLEVEL_OUTOF10: 5
PAINLEVEL_OUTOF10: 10
PAINLEVEL_OUTOF10: 3
PAINLEVEL_OUTOF10: 8
PAINLEVEL_OUTOF10: 10

## 2020-01-29 ASSESSMENT — PAIN DESCRIPTION - ORIENTATION
ORIENTATION: LOWER
ORIENTATION: LOWER
ORIENTATION: MID;LOWER

## 2020-01-29 ASSESSMENT — PAIN - FUNCTIONAL ASSESSMENT: PAIN_FUNCTIONAL_ASSESSMENT: ACTIVITIES ARE NOT PREVENTED

## 2020-01-29 ASSESSMENT — PAIN DESCRIPTION - ONSET
ONSET: ON-GOING

## 2020-01-29 ASSESSMENT — PAIN DESCRIPTION - PROGRESSION
CLINICAL_PROGRESSION: NOT CHANGED
CLINICAL_PROGRESSION: NOT CHANGED

## 2020-01-29 ASSESSMENT — PAIN DESCRIPTION - LOCATION
LOCATION: ABDOMEN

## 2020-01-29 ASSESSMENT — PAIN DESCRIPTION - DESCRIPTORS
DESCRIPTORS: CRAMPING

## 2020-01-29 ASSESSMENT — PAIN DESCRIPTION - PAIN TYPE
TYPE: SURGICAL PAIN

## 2020-01-29 NOTE — PROGRESS NOTES
results for input(s): AST, ALT, BILIDIR, BILITOT, ALKPHOS in the last 72 hours. No results for input(s): INR in the last 72 hours. No results for input(s): Levonia Citrin in the last 72 hours. Assessment/Plan:    Active Hospital Problems    Diagnosis Date Noted    Abdominal pain [R10.9] 01/21/2020     Priority: High    Severe malnutrition ([facility] [address] [E43] 01/28/2020    Left tubo-ovarian abscess [N70.93] 01/24/2020    Pelvic peritoneal adhesions, female (postoperative) (postinfection) [N73.6] 01/24/2020    Leukocytosis [D72.829] 01/21/2020    Nausea & vomiting [R11.2] 01/21/2020 1/23/20 LAPAROSCOPY EXPLORATORY -CONVERTED TO  OPEN   LEFT SALPINGECTOMY AND OOPHORECTOMY AND DRAINAGE OF TUBE OVARIAN ABSCESS AND LYSIS OF ADHESIONS Moiz Montaño MD           Assessment/Plan:   D/c when ok with surg . .. no BM yet . Prudencio Greene on  MOM  Tele - no event   IVF  kcl tid . ... monitor  k level   OB/GYN input noted . Prudencio Greene IV ABX , blood c/s: not done . . and surgical  c/s :noted   Pain control   Symptomatic treatment   Home meds   DVT proph   Diet: DIET GENERAL;  Dietary Nutrition Supplements: Standard High Calorie Oral Supplement  Code Status: Full Code    Treatment progress and plan was d/w pt/family .         Harish Rider MD

## 2020-01-29 NOTE — CARE COORDINATION
Pt requesting a shower bench with back also. Called/faxed info to [facility].    They will deliver to pt's room this pm.

## 2020-01-29 NOTE — PROGRESS NOTES
POD 6 exp lap with KAE and left salpingo-oophorectomy. AF VSS. She is anxious. Some cramping with meals. Passing flatus, no BM. Good UOP. JERRI drain is serous.  Surgical cx is polymicrobial.  abd is soft, drsg c/d/i. JERRI intact and draining well. Will give some MOM again. Will d/w Dr. Herminia Davis if ok to remove JERRI drain. Mobilize. Ok to discharge home.

## 2020-01-29 NOTE — CARE COORDINATION
Danyel Moreno requires the use of a tub transfer bench with back to successfully and safely perform bathing and personal care. Patient is unable to transfer in and out of tub/shower combo safely without the use of this assistive device due to the diagnosis of   Pelvic abscess, weakness and asthma Patient has been instructed on the proper use of this equipment and in agreement to utilize.

## 2020-01-29 NOTE — PROGRESS NOTES
Admit Date: 1/21/2020  POD ##5    Subjective:       Patient is doing well, ambulating without assistance, voiding freely and passing flatus. No BM.  + gas pains    Scheduled Meds:   bisacodyl  10 mg Rectal Once    lidocaine PF  5 mL Intradermal Once    sodium chloride flush  10 mL Intravenous 2 times per day    amoxicillin-clavulanate  1 tablet Oral 2 times per day    magnesium oxide  400 mg Oral BID    ipratropium-albuterol  1 ampule Inhalation Q4H WA    guaiFENesin  1,200 mg Oral BID    sodium chloride flush  10 mL Intravenous 2 times per day    docusate sodium  100 mg Oral BID    enoxaparin  30 mg Subcutaneous Daily     Continuous Infusions:  PRN Meds:magnesium hydroxide, HYDROmorphone **OR** HYDROmorphone, calcium carbonate, guaiFENesin-dextromethorphan, sodium chloride flush, acetaminophen, ondansetron, oxyCODONE-acetaminophen **OR** oxyCODONE-acetaminophen        Objective:     Patient Vitals for the past 8 hrs:   BP Temp Temp src Pulse Resp   01/29/20 1614 129/89 98.6 °F (37 °C) Oral 102 16     I/O last 3 completed shifts: In: 130 [P.O.:120; I.V.:10]  Out: 960 [Urine:900; Drains:60]  No intake/output data recorded.     General appearance: alert, appears stated age and cooperative  Neck: no adenopathy, supple, symmetrical, trachea midline and thyroid not enlarged, symmetric, no tenderness/mass/nodules  Abdomen: soft, non-tender; bowel sounds normal; no masses,  no organomegaly and incision c/d/i  Extremities: extremities normal, atraumatic, no cyanosis or edema    Data Review  01/28/2020 1541 01/28/2020 1609 Urinalysis [[id number]]   (Abnormal)   Urine, clean catch    Color, UA YELLOW    Clarity, UA SLIGHTLY CLOUDYAbnormal     Glucose, Urine NEGATIVE MG/DL   Bilirubin, Urine NEGATIVE MG/DL   Ketones, Urine NEGATIVE MG/DL   Specific Gravity, UA 1.014    Blood, Urine NEGATIVE    pH, Urine 9.0High Panic     Protein, UA 30Abnormal  MG/DL   Urobilinogen, Urine NORMAL MG/DL   Nitrite Urine, Quantitative NEGATIVE    Leukocyte Esterase, Urine NEGATIVE    RBC, UA 1 /HPF   WBC, UA 1 /HPF   Bacteria, UA NEGATIVE /HPF   Squam Epithel, UA 1 /HPF   Trichomonas, UA NONE SEEN /HPF           01/28/2020 1245 01/29/2020 1405 Urine culture [[id number]]   Urine, clean catch    No component results           01/27/2020 0534 01/27/2020 2291 Basic metabolic panel [[id number]]   (Abnormal)   Blood    Sodium 138 MMOL/L   Potassium 4.2 MMOL/L   Chloride 98Low  mMol/L   CO2 30 MMOL/L   Anion Gap 10    BUN 3Low  MG/DL   Creatinine 0.7 MG/DL   Glucose 94 MG/DL   Calcium 8.9 MG/DL   GFR Non-African American >60 mL/min/1.73m2   GFR African American >60 mL/min/1.73m2           01/27/2020 0534 01/27/2020 0655 CBC [[id number]]   (Abnormal)   Blood    WBC 7.3 K/CU MM   RBC 3.89Low  M/CU MM   Hemoglobin Quant 12.1Low  GM/DL   Hematocrit 40.7 %   . 6High  FL   MCH 31.1High  PG   MCHC 29.7Low  %   RDW 13.0 %   Platelet Count 715 K/CU MM   MPV 10.1 FL        Culture 01/23/2020  6:00 PM  - [facility]   GRAM POSITIVE COCCI SCANTY GROWTH ANAEROBE SLOW GROWTH RATE OF ISOLATE PROHIBITS FURTHER ID OR SENSITIVITY TESTING         Assessment:     POD #5 s/p exploratory laparotomy with I&D of multiple pelvic abscess, doing well, is having some significant gas pain.      Plan:     Ambulate  -dulcolax suppository  -probable discharge in AM, would recommend 14 days of augmentin    Doloris Tuality [facility] 1/29/2020 6:13 PM

## 2020-01-30 VITALS
TEMPERATURE: 98.2 F | HEIGHT: 66 IN | RESPIRATION RATE: 18 BRPM | SYSTOLIC BLOOD PRESSURE: 120 MMHG | WEIGHT: 105 LBS | BODY MASS INDEX: 16.88 KG/M2 | HEART RATE: 101 BPM | OXYGEN SATURATION: 97 % | DIASTOLIC BLOOD PRESSURE: 95 MMHG

## 2020-01-30 LAB
CULTURE: NORMAL
Lab: NORMAL
SPECIMEN: NORMAL

## 2020-01-30 PROCEDURE — 6370000000 HC RX 637 (ALT 250 FOR IP): Performed by: FAMILY MEDICINE

## 2020-01-30 PROCEDURE — 6370000000 HC RX 637 (ALT 250 FOR IP): Performed by: SURGERY

## 2020-01-30 PROCEDURE — 6370000000 HC RX 637 (ALT 250 FOR IP): Performed by: OBSTETRICS & GYNECOLOGY

## 2020-01-30 PROCEDURE — 94640 AIRWAY INHALATION TREATMENT: CPT

## 2020-01-30 PROCEDURE — 94761 N-INVAS EAR/PLS OXIMETRY MLT: CPT

## 2020-01-30 RX ORDER — AMOXICILLIN AND CLAVULANATE POTASSIUM 875; 125 MG/1; MG/1
1 TABLET, FILM COATED ORAL EVERY 12 HOURS SCHEDULED
Qty: 20 TABLET | Refills: 0 | Status: SHIPPED | OUTPATIENT
Start: 2020-01-30 | End: 2020-02-09

## 2020-01-30 RX ORDER — PSEUDOEPHEDRINE HCL 30 MG
100 TABLET ORAL 2 TIMES DAILY
Qty: 60 CAPSULE | Refills: 1 | Status: SHIPPED | OUTPATIENT
Start: 2020-01-30 | End: 2021-02-10

## 2020-01-30 RX ORDER — GUAIFENESIN 600 MG/1
1200 TABLET, EXTENDED RELEASE ORAL 2 TIMES DAILY
Qty: 14 TABLET | Refills: 0 | Status: SHIPPED | OUTPATIENT
Start: 2020-01-30

## 2020-01-30 RX ORDER — OXYCODONE HYDROCHLORIDE AND ACETAMINOPHEN 5; 325 MG/1; MG/1
1 TABLET ORAL EVERY 8 HOURS PRN
Qty: 7 TABLET | Refills: 0 | Status: SHIPPED | OUTPATIENT
Start: 2020-01-30 | End: 2020-02-02

## 2020-01-30 RX ADMIN — AMOXICILLIN AND CLAVULANATE POTASSIUM 1 TABLET: 875; 125 TABLET, FILM COATED ORAL at 09:58

## 2020-01-30 RX ADMIN — Medication 400 MG: at 08:54

## 2020-01-30 RX ADMIN — OXYCODONE HYDROCHLORIDE AND ACETAMINOPHEN 2 TABLET: 5; 325 TABLET ORAL at 04:36

## 2020-01-30 RX ADMIN — DOCUSATE SODIUM 100 MG: 100 CAPSULE, LIQUID FILLED ORAL at 08:54

## 2020-01-30 RX ADMIN — GUAIFENESIN 1200 MG: 600 TABLET, EXTENDED RELEASE ORAL at 08:53

## 2020-01-30 RX ADMIN — OXYCODONE HYDROCHLORIDE AND ACETAMINOPHEN 2 TABLET: 5; 325 TABLET ORAL at 09:09

## 2020-01-30 RX ADMIN — IPRATROPIUM BROMIDE AND ALBUTEROL SULFATE 1 AMPULE: .5; 3 SOLUTION RESPIRATORY (INHALATION) at 07:37

## 2020-01-30 ASSESSMENT — PAIN SCALES - GENERAL
PAINLEVEL_OUTOF10: 5
PAINLEVEL_OUTOF10: 5
PAINLEVEL_OUTOF10: 8
PAINLEVEL_OUTOF10: 7
PAINLEVEL_OUTOF10: 5
PAINLEVEL_OUTOF10: 8
PAINLEVEL_OUTOF10: 9

## 2020-01-30 ASSESSMENT — PAIN DESCRIPTION - DESCRIPTORS
DESCRIPTORS: CRAMPING
DESCRIPTORS: CRAMPING

## 2020-01-30 ASSESSMENT — PAIN DESCRIPTION - ONSET
ONSET: ON-GOING
ONSET: ON-GOING

## 2020-01-30 ASSESSMENT — PAIN DESCRIPTION - PAIN TYPE
TYPE: SURGICAL PAIN

## 2020-01-30 ASSESSMENT — PAIN DESCRIPTION - PROGRESSION: CLINICAL_PROGRESSION: NOT CHANGED

## 2020-01-30 ASSESSMENT — PAIN DESCRIPTION - LOCATION
LOCATION: ABDOMEN
LOCATION: ABDOMEN;BACK
LOCATION: ABDOMEN;BACK

## 2020-01-30 ASSESSMENT — PAIN DESCRIPTION - ORIENTATION
ORIENTATION: MID;LOWER

## 2020-01-30 ASSESSMENT — PAIN - FUNCTIONAL ASSESSMENT: PAIN_FUNCTIONAL_ASSESSMENT: ACTIVITIES ARE NOT PREVENTED

## 2020-01-30 ASSESSMENT — PAIN DESCRIPTION - FREQUENCY
FREQUENCY: CONTINUOUS
FREQUENCY: CONTINUOUS

## 2020-01-30 NOTE — PLAN OF CARE
Nutrition Problem: Severe malnutrition, In context of chronic illness  Intervention: Food and/or Nutrient Delivery: Continue current diet, Start ONS  Nutritional Goals: pt will consume greater than 75% of her meals and supplements
Problem: Pain:  Description  Pain management should include both nonpharmacologic and pharmacologic interventions.   Goal: Pain level will decrease  Description  Pain level will decrease  1/24/2020 1757 by Terri Hauser RN  Outcome: Ongoing  1/24/2020 1757 by Terri Hauser RN  Outcome: Ongoing  1/24/2020 0426 by Vinny Finley RN  Outcome: Ongoing  Goal: Control of acute pain  Description  Control of acute pain  1/24/2020 1757 by Terri Hauser RN  Outcome: Ongoing  1/24/2020 1757 by Terri Hauser RN  Outcome: Ongoing  1/24/2020 0426 by Vinny Finley RN  Outcome: Ongoing  Goal: Control of chronic pain  Description  Control of chronic pain  1/24/2020 1757 by Terri Hauser RN  Outcome: Ongoing  1/24/2020 1757 by Terri Hauser RN  Outcome: Ongoing  1/24/2020 0426 by Vinny Finley RN  Outcome: Ongoing  Goal: Patient's pain/discomfort is manageable  Description  Patient's pain/discomfort is manageable  1/24/2020 1757 by Terri Hauser RN  Outcome: Ongoing  1/24/2020 1757 by Terri Hauser RN  Outcome: Ongoing  1/24/2020 0426 by Vinny Finley RN  Outcome: Ongoing
Problem: Pain:  Goal: Pain level will decrease  Description  Pain level will decrease  1/25/2020 0441 by Mirian Srinivasan RN  Outcome: Ongoing  1/24/2020 1757 by Shruti Fay RN  Outcome: Ongoing  1/24/2020 1757 by Shruti Fay RN  Outcome: Ongoing  Goal: Control of acute pain  Description  Control of acute pain  1/25/2020 0441 by Mirian Srinivasan RN  Outcome: Ongoing  1/24/2020 1757 by Shruti Fay RN  Outcome: Ongoing  1/24/2020 1757 by Shruti Fay RN  Outcome: Ongoing  Goal: Control of chronic pain  Description  Control of chronic pain  1/25/2020 0441 by Mirian Srinivasan RN  Outcome: Ongoing  1/24/2020 1757 by Shruti Fay RN  Outcome: Ongoing  1/24/2020 1757 by Shruti Fay RN  Outcome: Ongoing  Goal: Patient's pain/discomfort is manageable  Description  Patient's pain/discomfort is manageable  1/25/2020 0441 by Mirian Srinivasan RN  Outcome: Ongoing  1/24/2020 1757 by Shruti Fay RN  Outcome: Ongoing  1/24/2020 1757 by Shruti Fay RN  Outcome: Ongoing     Problem: Infection:  Goal: Will remain free from infection  Description  Will remain free from infection  1/25/2020 0441 by Mirian Srinivasan RN  Outcome: Ongoing  1/24/2020 1757 by Shruti Fay RN  Outcome: Ongoing  1/24/2020 1757 by Shruti Fay RN  Outcome: Ongoing     Problem: Safety:  Goal: Free from accidental physical injury  Description  Free from accidental physical injury  1/25/2020 0441 by Mirian Srinivasan RN  Outcome: Ongoing  1/24/2020 1757 by Shruti Fay RN  Outcome: Ongoing  1/24/2020 1757 by Shruti Fay RN  Outcome: Ongoing  Goal: Free from intentional harm  Description  Free from intentional harm  1/25/2020 0441 by Mirian Srinivasan RN  Outcome: Ongoing  1/24/2020 1757 by Shruti Fay RN  Outcome: Ongoing  1/24/2020 1757 by Shruti Fay RN  Outcome: Ongoing     Problem: Daily Care:  Goal: Daily care needs are met  Description  Daily care needs are met  1/25/2020 0441 by Mirian Srinivasan RN  Outcome: Ongoing  1/24/2020 1757 by Shruti Fay RN  Outcome: Ongoing  1/24/2020 1757
Problem: Pain:  Goal: Pain level will decrease  Description  Pain level will decrease  1/28/2020 1005 by Britta Roberts LPN  Outcome: Ongoing  1/28/2020 0050 by Brandon Shook RN  Outcome: Ongoing  Goal: Control of acute pain  Description  Control of acute pain  1/28/2020 1005 by Britta Roberts LPN  Outcome: Ongoing  1/28/2020 0050 by Brandon Shook RN  Outcome: Ongoing  Goal: Control of chronic pain  Description  Control of chronic pain  1/28/2020 1005 by Britta Roberts LPN  Outcome: Ongoing  1/28/2020 0050 by Brandon Shook RN  Outcome: Ongoing  Goal: Patient's pain/discomfort is manageable  Description  Patient's pain/discomfort is manageable  1/28/2020 1005 by Britta Roberts LPN  Outcome: Ongoing  1/28/2020 0050 by Brandon Shook RN  Outcome: Ongoing     Problem: Infection:  Goal: Will remain free from infection  Description  Will remain free from infection  1/28/2020 1005 by Britta Roberts LPN  Outcome: Ongoing  1/28/2020 0050 by Brandon Shook RN  Outcome: Ongoing     Problem: Safety:  Goal: Free from accidental physical injury  Description  Free from accidental physical injury  1/28/2020 1005 by Britta Roberts LPN  Outcome: Ongoing  1/28/2020 0050 by Brandon Shook RN  Outcome: Ongoing  Goal: Free from intentional harm  Description  Free from intentional harm  1/28/2020 1005 by Britta Roberts LPN  Outcome: Ongoing  1/28/2020 0050 by Brandon Shook RN  Outcome: Ongoing     Problem: Daily Care:  Goal: Daily care needs are met  Description  Daily care needs are met  1/28/2020 1005 by Britta Roberts LPN  Outcome: Ongoing  1/28/2020 0050 by Brandon Shoko RN  Outcome: Ongoing     Problem: Skin Integrity:  Goal: Skin integrity will stabilize  Description  Skin integrity will stabilize  1/28/2020 1005 by Britta Roberts LPN  Outcome: Ongoing  1/28/2020 0050 by Brandon Shook RN  Outcome: Ongoing     Problem: Discharge Planning:  Goal: Patients continuum of care needs are
Problem: Pain:  Goal: Pain level will decrease  Description  Pain level will decrease  Outcome: Ongoing  Goal: Control of acute pain  Description  Control of acute pain  Outcome: Ongoing  Goal: Control of chronic pain  Description  Control of chronic pain  Outcome: Ongoing  Goal: Patient's pain/discomfort is manageable  Description  Patient's pain/discomfort is manageable  Outcome: Ongoing     Problem: Infection:  Goal: Will remain free from infection  Description  Will remain free from infection  Outcome: Ongoing     Problem: Safety:  Goal: Free from accidental physical injury  Description  Free from accidental physical injury  Outcome: Ongoing  Goal: Free from intentional harm  Description  Free from intentional harm  Outcome: Ongoing     Problem: Daily Care:  Goal: Daily care needs are met  Description  Daily care needs are met  Outcome: Ongoing     Problem: Skin Integrity:  Goal: Skin integrity will stabilize  Description  Skin integrity will stabilize  Outcome: Ongoing     Problem: Discharge Planning:  Goal: Patients continuum of care needs are met  Description  Patients continuum of care needs are met  Outcome: Ongoing     Problem: Falls - Risk of:  Goal: Will remain free from falls  Description  Will remain free from falls  Outcome: Ongoing  Goal: Absence of physical injury  Description  Absence of physical injury  Outcome: Ongoing
Problem: Pain:  Goal: Pain level will decrease  Outcome: Ongoing  Goal: Control of acute pain  Outcome: Ongoing  Goal: Control of chronic pain  Outcome: Ongoing  Goal: Patient's pain/discomfort is manageable  Outcome: Ongoing     Problem: Safety:  Goal: Free from accidental physical injury  Outcome: Ongoing  Goal: Free from intentional harm  Outcome: Ongoing     Problem: Infection:  Goal: Will remain free from infection  Outcome: Ongoing
continuum of care needs are met  1/30/2020 1030 by Hayden Ramon RN  Outcome: Ongoing  1/30/2020 0012 by Craig Weldon RN  Outcome: Ongoing     Problem: Falls - Risk of:  Goal: Will remain free from falls  Description  Will remain free from falls  1/30/2020 1030 by Hayden Ramon RN  Outcome: Ongoing  1/30/2020 0012 by Craig Weldon RN  Outcome: Ongoing  Goal: Absence of physical injury  Description  Absence of physical injury  1/30/2020 1030 by Hayden Ramon RN  Outcome: Ongoing  1/30/2020 0012 by Craig Weldon RN  Outcome: Ongoing     Problem: Nutrition  Goal: Optimal nutrition therapy  1/30/2020 1030 by Hayden Ramon RN  Outcome: Ongoing  1/30/2020 0012 by Craig Weldon RN  Outcome: Ongoing

## 2020-01-30 NOTE — PROGRESS NOTES
Attending Progress Note      PCP: Harish Rider MD    Patient: Anai Torres   Gender: female  : 1975   Age: 40 y.o. MRN: [id number]      Date of Admission: 2020    Chief Complaint:   Chief Complaint   Patient presents with    Abdominal Pain    Back Pain    Nausea           Subjective:  Abdominal pain . .. no BM yet . [place] .. pt does not feeling comfortable to be discharged before having BM     Medications:  Reviewed  Infusion Medications     Scheduled Medications    lidocaine PF  5 mL Intradermal Once    sodium chloride flush  10 mL Intravenous 2 times per day    amoxicillin-clavulanate  1 tablet Oral 2 times per day    magnesium oxide  400 mg Oral BID    ipratropium-albuterol  1 ampule Inhalation Q4H WA    guaiFENesin  1,200 mg Oral BID    sodium chloride flush  10 mL Intravenous 2 times per day    docusate sodium  100 mg Oral BID    enoxaparin  30 mg Subcutaneous Daily     PRN Meds: magnesium hydroxide, HYDROmorphone **OR** HYDROmorphone, calcium carbonate, guaiFENesin-dextromethorphan, sodium chloride flush, acetaminophen, ondansetron, oxyCODONE-acetaminophen **OR** oxyCODONE-acetaminophen      Intake/Output Summary (Last 24 hours) at 2020 2318  Last data filed at 2020 2100  Gross per 24 hour   Intake 190 ml   Output 960 ml   Net -770 ml       Exam:  /67   Pulse 89   Temp 98.1 °F (36.7 °C) (Oral)   Resp 16   Ht 5' 6\" (1.676 m)   Wt 107 lb (48.5 kg)   LMP 2019   SpO2 93%   BMI 17.27 kg/m²   General appearance: mild to moderate  distress,   Respiratory:  good air entry , no Rales , No wheezing, or rhonchi,  Cardiovascular: RRR, with normal S1/S2 without murmurs. Abdomen : Soft, tender, non-distended  , normal bowel sounds. Legs : No edema bilaterally.  No DVT signs ,    Neurologic:  Alert and oriented ,        Labs:   Recent Labs     20  0534   WBC 7.3   HGB 12.1*   HCT 40.7        Recent Labs     20  0534      K 4.2   CL 98* CO2 30   BUN 3*   CREATININE 0.7   CALCIUM 8.9     No results for input(s): AST, ALT, BILIDIR, BILITOT, ALKPHOS in the last 72 hours. No results for input(s): INR in the last 72 hours. No results for input(s): Ree Hernandez in the last 72 hours. Assessment/Plan:    Active Hospital Problems    Diagnosis Date Noted    Abdominal pain [R10.9] 01/21/2020     Priority: High    Severe malnutrition ([facility] [address] [E43] 01/28/2020    Left tubo-ovarian abscess [N70.93] 01/24/2020    Pelvic peritoneal adhesions, female (postoperative) (postinfection) [N73.6] 01/24/2020    Leukocytosis [D72.829] 01/21/2020    Nausea & vomiting [R11.2] 01/21/2020 1/23/20 LAPAROSCOPY EXPLORATORY -CONVERTED TO  OPEN   LEFT SALPINGECTOMY AND OOPHORECTOMY AND DRAINAGE OF TUBE OVARIAN ABSCESS AND LYSIS OF ADHESIONS Moiz Rubi MD           Assessment/Plan:   D/c when ok with surg . .. no BM yet . Renetta Jones on  MOM  Tele - no event   IVF  kcl tid . ... monitor  k level   OB/GYN input noted . Renetta Jones IV ABX , blood c/s: not done . . and surgical  c/s :noted   Pain control   Symptomatic treatment   Home meds   DVT proph   Diet: DIET GENERAL;  Dietary Nutrition Supplements: Standard High Calorie Oral Supplement  Code Status: Full Code    Treatment progress and plan was d/w pt/family .         Lissa Archibald MD

## 2020-01-30 NOTE — PROGRESS NOTES
POD 7 exp lap with KAE and left salpingo-oophorectomy. AF VSS. She had a BM. Feeling better and JERRI drain is out. Good UOP. Surgical cx is polymicrobial.  abd is soft, drsg c/d/i. Ok to discharge home. F/U in my office in one week.

## 2020-01-30 NOTE — DISCHARGE SUMMARY
Patient: Lakshmi Scott MD      Gender: female  : 1975   Age: 40 y.o. MRN: [id number]    Admitting Physician: Chanell Mercer MD  Discharge Physician: Chanell Mercer MD     Code Status: Full Code     Admit Date: 2020   Discharge Date: 20      Disposition:  Home       Condition at Discharge:  stable . Follow-up appointments:  f/u one week with PCP , and with consultants as recommended . Outpatient to do list: f/u       Discharge Diagnoses: Active Hospital Problems    Diagnosis    Abdominal pain [R10.9]     Priority: High    Severe malnutrition (HCC) [E43]    Left tubo-ovarian abscess [N70.93]    Pelvic peritoneal adhesions, female (postoperative) (postinfection) [N73.6]    Leukocytosis [D72.829]    Nausea & vomiting [R11.2]   electrolytes imbalance , corrected       Hospital Course:      20 LAPAROSCOPY EXPLORATORY -CONVERTED TO  OPEN   LEFT SALPINGECTOMY AND OOPHORECTOMY AND DRAINAGE OF TUBE OVARIAN ABSCESS AND LYSIS OF ADHESIONS Moiz Heaton MD          History of Present Illness:  Teresa Slade is a 40 y.o. female with ongoing chronic pelvic and lower abdominal pain with uteral fibroid and she is following with her OB/GYN in deferent facility . Pt presented to ER complaining of acutely worsening abdominal pain , severe constant 9-10/10 associated with nausea and vomiting ,denied any hematuria or vaginal bleed . Lab data revealed leukocytosis and hypokalemia , and ABD /plvic CT revealed Dilated tubular structure in the left adnexal region with peripheral enhancement.  This structure measures slightly greater than simple fluid density and could represent a hydrosalpinx or pyosalpinx.  Additionally,there is an approximately 4 cm cystic right adnexal lesion. Small volume free fluid in the lower pelvis. and The uterus has a lobular contour which may represent known uterine fibroids. History obtained from patient . pt seen in ER    Assessment/Plan:   Admitted on IVF, IV ABX . [address]lene [address] done . . then pt had surgery as menioned above . .. back to normal after tht , functining bowel movement . Arlene Ferrara pt d/c ed home /c when ok with surg . .. no BM yet . Arlene Ferrara on  MOM  IV ABX , blood c/s: not done . . and surgical  c/s :noted   Pain control   Symptomatic treatment     Diet: DIET GENERAL;        Consults. PHARMACY TO DOSE VANCOMYCIN  IP CONSULT TO OB GYN  IP CONSULT TO OB GYN  IP CONSULT TO INTERVENTIONAL RADIOLOGY  IP CONSULT TO IV TEAM        Discharge Medications:   Current Discharge Medication List      START taking these medications    Details   guaiFENesin (MUCINEX) 600 MG extended release tablet Take 2 tablets by mouth 2 times daily  Qty: 14 tablet, Refills: 0      docusate sodium (COLACE, DULCOLAX) 100 MG CAPS Take 100 mg by mouth 2 times daily  Qty: 60 capsule, Refills: 1      amoxicillin-clavulanate (AUGMENTIN) 875-125 MG per tablet Take 1 tablet by mouth every 12 hours for 10 days  Qty: 20 tablet, Refills: 0           Current Discharge Medication List      CONTINUE these medications which have CHANGED    Details   oxyCODONE-acetaminophen (PERCOCET) 5-325 MG per tablet Take 1 tablet by mouth every 8 hours as needed for Pain for up to 3 days. Qty: 7 tablet, Refills: 0    Comments: Reduce doses taken as pain becomes manageable  Associated Diagnoses: Lower abdominal pain           Current Discharge Medication List      CONTINUE these medications which have NOT CHANGED    Details   Menthol (ICY HOT) 5 % PTCH Apply 1 patch topically daily as needed      CALCIUM 600+D HIGH POTENCY 600-400 MG-UNIT TABS per tab Take 1 tablet by mouth daily      Multiple Vitamin (DAILY-MIGNON) TABS Take 1 tablet by mouth daily      rOPINIRole (REQUIP) 1 MG tablet Take 1 mg by mouth nightly      LORazepam (ATIVAN) 0.5 MG tablet Take 0.5 mg by mouth every 6 hours as needed for Anxiety.       QUEtiapine (SEROQUEL) 100 MG tablet Take 100 mg by mouth nightly       polycarbophil (FIBERCON) 625 MG tablet Take 625 mg by mouth daily      butalbital-aspirin-caffeine (FIORINAL) -40 MG capsule Take by mouth every 4 hours as needed for Headaches. prochlorperazine (COMPAZINE) 5 MG tablet Take 5 mg by mouth every 6 hours as needed for Nausea      diphenhydrAMINE (BENADRYL) 25 MG capsule Take 25 mg by mouth every 6 hours as needed for Itching      ondansetron (ZOFRAN ODT) 4 MG disintegrating tablet Take 1 tablet by mouth every 8 hours as needed for Nausea  Qty: 15 tablet, Refills: 0      hydrocortisone 2.5 % cream Apply topically daily as needed      fluticasone (FLONASE) 50 MCG/ACT nasal spray 1 spray by Nasal route daily      albuterol sulfate HFA (PROVENTIL HFA) 108 (90 Base) MCG/ACT inhaler Inhale 2 puffs into the lungs every 4 hours as needed for Wheezing or Shortness of Breath With spacer (and mask if indicated). Thanks. Qty: 1 Inhaler, Refills: 1      acetaminophen (TYLENOL) 325 MG tablet Take 2 tablets by mouth every 6 hours as needed for Pain  Qty: 120 tablet, Refills: 3           Current Discharge Medication List      STOP taking these medications       Pseudoephedrine-DM-GG 60- MG TABS Comments:   Reason for Stopping:         vitamin D (CHOLECALCIFEROL) 1000 UNIT TABS tablet Comments:   Reason for Stopping:         naproxen (NAPROSYN) 500 MG tablet Comments:   Reason for Stopping:               Discharge ROS:  A complete review of systems was asked and negative except for abd pain      Discharge Exam:    BP (!) 120/95   Pulse 101   Temp 98.2 °F (36.8 °C) (Oral)   Resp 18   Ht 5' 6\" (1.676 m)   Wt 105 lb (47.6 kg)   LMP 12/24/2019   SpO2 97%   BMI 16.95 kg/m²   General appearance:  NAD  Heart[de-identified] Normal s1/s2, RRR, no murmurs, gallops, or rubs. No leg edema  Lungs:  Clear to auscultation, bilaterally without Rales/Wheezes/Rhonchi.   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 Organs: The liver, gallbladder, pancreas and spleen, adrenals, kidneys, aorta and IVC appear stable. GI/Bowel: No evidence of bowel obstruction or perforation. Normal appendix. No evidence of acute diverticulitis. Pelvis: A right adnexal cyst measures 4.4 x 4.9 cm not previously seen. There is redemonstration of the left adnexal mass which measures 5.5 x 5.3 cm previously measuring 4.7 x 4.8 cm. The uterus and urinary bladder demonstrate no acute abnormality. Peritoneum/Retroperitoneum: No evidence of lymphadenopathy or acute peritoneal process. Bones/Soft Tissues: No acute abnormality. 1. Bilateral pelvic adnexal masses. On the right there is a cyst which measures 4.4 x 4.9 cm which is new and on the left there is a mass which appears solid measuring 5.5 x 5.3 cm slightly larger compared with the previous evaluation. 2. Normal appendix. No acute gastrointestinal abnormality. RECOMMENDATIONS: Pelvic MRI for further evaluation of the adnexal abnormalities. Us Non Ob Transvaginal    Result Date: 1/22/2020  EXAMINATION: PELVIC ULTRASOUND 1/21/2020 TECHNIQUE: Transvaginal pelvic ultrasound was performed. No color Doppler evaluation was performed. COMPARISON: CT earlier today and 8/11/2019 HISTORY: ORDERING SYSTEM PROVIDED HISTORY: Further evaluate dilated tubular structure in the left adnexal region TECHNOLOGIST PROVIDED HISTORY: Reason for exam:->Further evaluate dilated tubular structure in the left adnexal region Reason for Exam: left adnexal tubular structure Acuity: Acute Type of Exam: Initial Additional signs and symptoms: nk Relevant Medical/Surgical History: nk FINDINGS: Measurements: Uterus:  Approximately 8.2 x 5.7 x 5.4 cm. The uterus is displaced to the left. Endometrial stripe:  Not well visualized. Ovaries are difficult to visualize.  Ultrasound Findings:

## 2020-01-30 NOTE — PROGRESS NOTES
Pt called this RN and stated her sister, Zuhair Lynn is unable to  her prescription and states her other sister, Asad Fortune will  prescription.

## 2020-02-12 PROBLEM — K65.1 INTRA-ABDOMINAL ABSCESS (HCC): Status: ACTIVE | Noted: 2020-02-12

## 2020-02-12 PROBLEM — Z98.890 S/P EXPLORATORY LAPAROTOMY: Status: ACTIVE | Noted: 2020-02-12

## 2020-03-16 ENCOUNTER — HOSPITAL ENCOUNTER (OUTPATIENT)
Age: 45
Discharge: HOME | End: 2020-03-16
Payer: COMMERCIAL

## 2020-03-16 LAB
ALBUMIN SERPL-MCNC: 4.5 GM/DL (ref 3.4–5)
ALP BLD-CCNC: 113 IU/L (ref 40–129)
ALT SERPL-CCNC: 9 U/L (ref 10–40)
ANION GAP SERPL CALCULATED.3IONS-SCNC: 13 MMOL/L (ref 4–16)
AST SERPL-CCNC: 15 IU/L (ref 15–37)
BACTERIA: NEGATIVE /HPF
BASOPHILS ABSOLUTE: 0 K/CU MM
BASOPHILS RELATIVE PERCENT: 0.3 % (ref 0–1)
BILIRUB SERPL-MCNC: 0.3 MG/DL (ref 0–1)
BILIRUBIN DIRECT: 0.2 MG/DL (ref 0–0.3)
BILIRUBIN URINE: NEGATIVE MG/DL
BILIRUBIN, INDIRECT: 0.1 MG/DL (ref 0–0.7)
BLOOD, URINE: NEGATIVE
BUN BLDV-MCNC: 9 MG/DL (ref 6–23)
CALCIUM SERPL-MCNC: 9.4 MG/DL (ref 8.3–10.6)
CHLORIDE BLD-SCNC: 106 MMOL/L (ref 99–110)
CHOLESTEROL: 187 MG/DL
CLARITY: CLEAR
CO2: 21 MMOL/L (ref 21–32)
COLOR: YELLOW
CREAT SERPL-MCNC: 0.8 MG/DL (ref 0.6–1.1)
DIFFERENTIAL TYPE: ABNORMAL
EOSINOPHILS ABSOLUTE: 0.1 K/CU MM
EOSINOPHILS RELATIVE PERCENT: 0.9 % (ref 0–3)
ERYTHROCYTE SEDIMENTATION RATE: 23 MM/HR (ref 0–20)
ESTIMATED AVERAGE GLUCOSE: 105 MG/DL
FERRITIN: 57 NG/ML (ref 15–150)
FOLATE: 7.3 NG/ML (ref 3.1–17.5)
GFR AFRICAN AMERICAN: >60 ML/MIN/1.73M2
GFR NON-AFRICAN AMERICAN: >60 ML/MIN/1.73M2
GLUCOSE BLD-MCNC: 87 MG/DL (ref 70–99)
GLUCOSE, URINE: NEGATIVE MG/DL
HBA1C MFR BLD: 5.3 % (ref 4.2–6.3)
HCT VFR BLD CALC: 46.9 % (ref 37–47)
HDLC SERPL-MCNC: 65 MG/DL
HEMOGLOBIN: 14.6 GM/DL (ref 12.5–16)
IMMATURE NEUTROPHIL %: 0.2 % (ref 0–0.43)
IRON: 81 UG/DL (ref 37–145)
KETONES, URINE: NEGATIVE MG/DL
LDL CHOLESTEROL DIRECT: 117 MG/DL
LEUKOCYTE ESTERASE, URINE: NEGATIVE
LYMPHOCYTES ABSOLUTE: 1.7 K/CU MM
LYMPHOCYTES RELATIVE PERCENT: 29.1 % (ref 24–44)
MAGNESIUM: 2 MG/DL (ref 1.8–2.4)
MCH RBC QN AUTO: 31.1 PG (ref 27–31)
MCHC RBC AUTO-ENTMCNC: 31.1 % (ref 32–36)
MCV RBC AUTO: 99.8 FL (ref 78–100)
MONOCYTES ABSOLUTE: 0.6 K/CU MM
MONOCYTES RELATIVE PERCENT: 9.5 % (ref 0–4)
MUCUS: ABNORMAL HPF
NITRITE URINE, QUANTITATIVE: NEGATIVE
NUCLEATED RBC %: 0 %
PCT TRANSFERRIN: 22 % (ref 10–44)
PDW BLD-RTO: 13.1 % (ref 11.7–14.9)
PH, URINE: 5 (ref 5–8)
PLATELET # BLD: 298 K/CU MM (ref 140–440)
PMV BLD AUTO: 9.8 FL (ref 7.5–11.1)
POTASSIUM SERPL-SCNC: 4 MMOL/L (ref 3.5–5.1)
PROTEIN UA: NEGATIVE MG/DL
RBC # BLD: 4.7 M/CU MM (ref 4.2–5.4)
RBC URINE: <1 /HPF (ref 0–6)
SEGMENTED NEUTROPHILS ABSOLUTE COUNT: 3.5 K/CU MM
SEGMENTED NEUTROPHILS RELATIVE PERCENT: 60 % (ref 36–66)
SODIUM BLD-SCNC: 140 MMOL/L (ref 135–145)
SPECIFIC GRAVITY UA: 1.02 (ref 1–1.03)
SQUAMOUS EPITHELIAL: 2 /HPF
T4 FREE: 0.89 NG/DL (ref 0.9–1.8)
TOTAL IMMATURE NEUTOROPHIL: 0.01 K/CU MM
TOTAL IRON BINDING CAPACITY: 369 UG/DL (ref 250–450)
TOTAL NUCLEATED RBC: 0 K/CU MM
TOTAL PROTEIN: 7.4 GM/DL (ref 6.4–8.2)
TRICHOMONAS: ABNORMAL /HPF
TRIGL SERPL-MCNC: 109 MG/DL
TSH HIGH SENSITIVITY: 0.72 UIU/ML (ref 0.27–4.2)
UNSATURATED IRON BINDING CAPACITY: 288 UG/DL (ref 110–370)
URIC ACID: 3.7 MG/DL (ref 2.6–6)
UROBILINOGEN, URINE: NORMAL MG/DL (ref 0.2–1)
VITAMIN B-12: 538.6 PG/ML (ref 211–911)
VITAMIN D 25-HYDROXY: 12.01 NG/ML
WBC # BLD: 5.9 K/CU MM (ref 4–10.5)
WBC UA: <1 /HPF (ref 0–5)

## 2020-03-16 PROCEDURE — 80061 LIPID PANEL: CPT

## 2020-03-16 PROCEDURE — 81001 URINALYSIS AUTO W/SCOPE: CPT

## 2020-03-16 PROCEDURE — 83721 ASSAY OF BLOOD LIPOPROTEIN: CPT

## 2020-03-16 PROCEDURE — 86038 ANTINUCLEAR ANTIBODIES: CPT

## 2020-03-16 PROCEDURE — 82607 VITAMIN B-12: CPT

## 2020-03-16 PROCEDURE — 82728 ASSAY OF FERRITIN: CPT

## 2020-03-16 PROCEDURE — 84439 ASSAY OF FREE THYROXINE: CPT

## 2020-03-16 PROCEDURE — 85025 COMPLETE CBC W/AUTO DIFF WBC: CPT

## 2020-03-16 PROCEDURE — 84550 ASSAY OF BLOOD/URIC ACID: CPT

## 2020-03-16 PROCEDURE — 83550 IRON BINDING TEST: CPT

## 2020-03-16 PROCEDURE — 85652 RBC SED RATE AUTOMATED: CPT

## 2020-03-16 PROCEDURE — 84443 ASSAY THYROID STIM HORMONE: CPT

## 2020-03-16 PROCEDURE — 83735 ASSAY OF MAGNESIUM: CPT

## 2020-03-16 PROCEDURE — 82746 ASSAY OF FOLIC ACID SERUM: CPT

## 2020-03-16 PROCEDURE — 36415 COLL VENOUS BLD VENIPUNCTURE: CPT

## 2020-03-16 PROCEDURE — 80053 COMPREHEN METABOLIC PANEL: CPT

## 2020-03-16 PROCEDURE — 82248 BILIRUBIN DIRECT: CPT

## 2020-03-16 PROCEDURE — 83036 HEMOGLOBIN GLYCOSYLATED A1C: CPT

## 2020-03-16 PROCEDURE — 82306 VITAMIN D 25 HYDROXY: CPT

## 2020-03-16 PROCEDURE — 83540 ASSAY OF IRON: CPT

## 2020-03-16 PROCEDURE — 86430 RHEUMATOID FACTOR TEST QUAL: CPT

## 2020-03-17 LAB
RHEUMATOID FACTOR: 10 IU/ML (ref 0–14)
RHEUMATOID FACTOR: NORMAL IU/ML (ref 0–14)

## 2020-03-18 LAB
ANTI-NUCLEAR ANTIBODY (ANA): NORMAL
ANTI-NUCLEAR ANTIBODY (ANA): NORMAL

## 2020-05-13 ENCOUNTER — HOSPITAL ENCOUNTER (OUTPATIENT)
Age: 45
Discharge: HOME | End: 2020-05-13
Payer: COMMERCIAL

## 2020-05-13 ENCOUNTER — HOSPITAL ENCOUNTER (OUTPATIENT)
Dept: GENERAL RADIOLOGY | Age: 45
Discharge: HOME | End: 2020-05-13
Payer: COMMERCIAL

## 2020-05-13 PROCEDURE — 73630 X-RAY EXAM OF FOOT: CPT

## 2020-06-12 NOTE — ED PROVIDER NOTES
Verified patient's first and last name, and . Patient stated reason for visit today is to receive mantoux reading.    Mantoux result:  Lab Results   Component Value Date    PPDREDNESS 0 2020    PPDINDURATIO 0 2020     Is induration greater than 5mm?  No     Patient to setup second step of Mantoux prior to leaving the clinic.     Nguyen TELLON, RN on 2020 at 2:19 PM          HFA (PROVENTIL HFA) 108 (90 BASE) MCG/ACT inhaler Inhale 2 puffs into the lungs every 6 hours as needed for Wheezing or Shortness of Breath 1 Inhaler 0    ondansetron (ZOFRAN ODT) 4 MG disintegrating tablet Take 1 tablet by mouth every 8 hours as needed for Nausea 15 tablet 0     Allergies   Allergen Reactions    Morphine And Related Hives    Ibuprofen Hives    Motrin [Ibuprofen Micronized] Hives       Nursing Notes Reviewed    Physical Exam:  ED Triage Vitals   Enc Vitals Group      BP 12/20/18 0124 (!) 121/93      Pulse 12/20/18 0124 96      Resp 12/20/18 0124 18      Temp 12/20/18 0124 97.9 °F (36.6 °C)      Temp Source 12/20/18 0124 Oral      SpO2 12/20/18 0124 100 %      Weight 12/20/18 0123 106 lb (48.1 kg)      Height 12/20/18 0123 5' 6\" (1.676 m)      Head Circumference --       Peak Flow --       Pain Score --       Pain Loc --       Pain Edu? --       Excl. in [address]? --        My pulse ox interpretation is - normal    General appearance:  No acute distress. Skin:  Warm. Dry. Eye:  Extraocular movements intact. PERRL   Ears, nose, mouth and throat:  Oral mucosa moist   Neck:  Trachea midline. Extremity:  No swelling. Normal ROM     Heart:  Regular  Perfusion:  intact  Respiratory: Respirations nonlabored. Abdominal:   Non distended. Neurological:  Alert and oriented times 3.  5 out of 5 motor strength in extremities,             sensation intact to light touch in extremities, cranial nerves grossly intact, visual                fields intact, negative pronator drift, rapid alternating movements intact    I have reviewed and interpreted all of the currently available lab results from this visit (if applicable):  No results found for this visit on 12/20/18.    Radiographs (if obtained):  [] The following radiograph was interpreted by myself in the absence of a radiologist:   [] Radiologist's Report Reviewed:  No orders to display         Chart review shows recent radiographs:  Ct Head

## 2020-07-02 ENCOUNTER — HOSPITAL ENCOUNTER (OUTPATIENT)
Age: 45
Discharge: HOME | End: 2020-07-02
Payer: COMMERCIAL

## 2020-07-02 LAB
BACTERIA: NEGATIVE /HPF
BILIRUBIN URINE: NEGATIVE MG/DL
BLOOD, URINE: NEGATIVE
CLARITY: ABNORMAL
COLOR: YELLOW
GLUCOSE, URINE: NEGATIVE MG/DL
KETONES, URINE: NEGATIVE MG/DL
LEUKOCYTE ESTERASE, URINE: ABNORMAL
MUCUS: ABNORMAL HPF
NITRITE URINE, QUANTITATIVE: NEGATIVE
PH, URINE: 6 (ref 5–8)
PROTEIN UA: NEGATIVE MG/DL
RBC URINE: ABNORMAL /HPF (ref 0–6)
SPECIFIC GRAVITY UA: 1.02 (ref 1–1.03)
SQUAMOUS EPITHELIAL: 11 /HPF
TRICHOMONAS: ABNORMAL /HPF
UROBILINOGEN, URINE: 1 MG/DL (ref 0.2–1)
WBC UA: 26 /HPF (ref 0–5)

## 2020-07-02 PROCEDURE — 87086 URINE CULTURE/COLONY COUNT: CPT

## 2020-07-02 PROCEDURE — 81001 URINALYSIS AUTO W/SCOPE: CPT

## 2020-07-03 LAB
CULTURE: NORMAL
Lab: NORMAL
SPECIMEN: NORMAL

## 2021-02-10 ENCOUNTER — HOSPITAL ENCOUNTER (EMERGENCY)
Age: 46
Discharge: HOME | End: 2021-02-10
Payer: COMMERCIAL

## 2021-02-10 ENCOUNTER — APPOINTMENT (OUTPATIENT)
Dept: CT IMAGING | Age: 46
End: 2021-02-10
Payer: COMMERCIAL

## 2021-02-10 VITALS
BODY MASS INDEX: 18 KG/M2 | OXYGEN SATURATION: 97 % | DIASTOLIC BLOOD PRESSURE: 99 MMHG | TEMPERATURE: 98.1 F | WEIGHT: 112 LBS | HEIGHT: 66 IN | RESPIRATION RATE: 14 BRPM | SYSTOLIC BLOOD PRESSURE: 131 MMHG | HEART RATE: 66 BPM

## 2021-02-10 DIAGNOSIS — R10.9 ABDOMINAL PAIN, UNSPECIFIED ABDOMINAL LOCATION: ICD-10-CM

## 2021-02-10 DIAGNOSIS — K62.5 RECTAL BLEEDING: ICD-10-CM

## 2021-02-10 DIAGNOSIS — R51.9 NONINTRACTABLE HEADACHE, UNSPECIFIED CHRONICITY PATTERN, UNSPECIFIED HEADACHE TYPE: ICD-10-CM

## 2021-02-10 DIAGNOSIS — R11.2 NAUSEA AND VOMITING, INTRACTABILITY OF VOMITING NOT SPECIFIED, UNSPECIFIED VOMITING TYPE: Primary | ICD-10-CM

## 2021-02-10 LAB
ALBUMIN SERPL-MCNC: 4.2 GM/DL (ref 3.4–5)
ALP BLD-CCNC: 77 IU/L (ref 40–129)
ALT SERPL-CCNC: 14 U/L (ref 10–40)
ANION GAP SERPL CALCULATED.3IONS-SCNC: 12 MMOL/L (ref 4–16)
AST SERPL-CCNC: 37 IU/L (ref 15–37)
BASOPHILS ABSOLUTE: 0 K/CU MM
BASOPHILS RELATIVE PERCENT: 0.5 % (ref 0–1)
BILIRUB SERPL-MCNC: 0.3 MG/DL (ref 0–1)
BUN BLDV-MCNC: 11 MG/DL (ref 6–23)
CALCIUM SERPL-MCNC: 8.8 MG/DL (ref 8.3–10.6)
CHLORIDE BLD-SCNC: 103 MMOL/L (ref 99–110)
CO2: 21 MMOL/L (ref 21–32)
CREAT SERPL-MCNC: 0.8 MG/DL (ref 0.6–1.1)
DIFFERENTIAL TYPE: ABNORMAL
EOSINOPHILS ABSOLUTE: 0 K/CU MM
EOSINOPHILS RELATIVE PERCENT: 0.5 % (ref 0–3)
GFR AFRICAN AMERICAN: >60 ML/MIN/1.73M2
GFR NON-AFRICAN AMERICAN: >60 ML/MIN/1.73M2
GLUCOSE BLD-MCNC: 86 MG/DL (ref 70–99)
HCG QUALITATIVE: NEGATIVE
HCT VFR BLD CALC: 43.7 % (ref 37–47)
HEMOGLOBIN: 14.2 GM/DL (ref 12.5–16)
IMMATURE NEUTROPHIL %: 0.2 % (ref 0–0.43)
LIPASE: 25 IU/L (ref 13–60)
LYMPHOCYTES ABSOLUTE: 1 K/CU MM
LYMPHOCYTES RELATIVE PERCENT: 17.4 % (ref 24–44)
MCH RBC QN AUTO: 32.6 PG (ref 27–31)
MCHC RBC AUTO-ENTMCNC: 32.5 % (ref 32–36)
MCV RBC AUTO: 100.5 FL (ref 78–100)
MONOCYTES ABSOLUTE: 0.4 K/CU MM
MONOCYTES RELATIVE PERCENT: 7.7 % (ref 0–4)
NUCLEATED RBC %: 0 %
PDW BLD-RTO: 12.7 % (ref 11.7–14.9)
PLATELET # BLD: 211 K/CU MM (ref 140–440)
PMV BLD AUTO: 9.7 FL (ref 7.5–11.1)
POTASSIUM SERPL-SCNC: 4.1 MMOL/L (ref 3.5–5.1)
RBC # BLD: 4.35 M/CU MM (ref 4.2–5.4)
SEGMENTED NEUTROPHILS ABSOLUTE COUNT: 4.2 K/CU MM
SEGMENTED NEUTROPHILS RELATIVE PERCENT: 73.7 % (ref 36–66)
SODIUM BLD-SCNC: 136 MMOL/L (ref 135–145)
TOTAL IMMATURE NEUTOROPHIL: 0.01 K/CU MM
TOTAL NUCLEATED RBC: 0 K/CU MM
TOTAL PROTEIN: 7.3 GM/DL (ref 6.4–8.2)
WBC # BLD: 5.7 K/CU MM (ref 4–10.5)

## 2021-02-10 PROCEDURE — 96375 TX/PRO/DX INJ NEW DRUG ADDON: CPT

## 2021-02-10 PROCEDURE — 6370000000 HC RX 637 (ALT 250 FOR IP): Performed by: PHYSICIAN ASSISTANT

## 2021-02-10 PROCEDURE — 96374 THER/PROPH/DIAG INJ IV PUSH: CPT

## 2021-02-10 PROCEDURE — 83690 ASSAY OF LIPASE: CPT

## 2021-02-10 PROCEDURE — 85025 COMPLETE CBC W/AUTO DIFF WBC: CPT

## 2021-02-10 PROCEDURE — 84703 CHORIONIC GONADOTROPIN ASSAY: CPT

## 2021-02-10 PROCEDURE — 2580000003 HC RX 258: Performed by: PHYSICIAN ASSISTANT

## 2021-02-10 PROCEDURE — 80053 COMPREHEN METABOLIC PANEL: CPT

## 2021-02-10 PROCEDURE — 6360000002 HC RX W HCPCS: Performed by: PHYSICIAN ASSISTANT

## 2021-02-10 PROCEDURE — 6360000004 HC RX CONTRAST MEDICATION: Performed by: PHYSICIAN ASSISTANT

## 2021-02-10 PROCEDURE — 99285 EMERGENCY DEPT VISIT HI MDM: CPT

## 2021-02-10 PROCEDURE — 74174 CTA ABD&PLVS W/CONTRAST: CPT

## 2021-02-10 RX ORDER — METOCLOPRAMIDE HYDROCHLORIDE 5 MG/ML
10 INJECTION INTRAMUSCULAR; INTRAVENOUS ONCE
Status: COMPLETED | OUTPATIENT
Start: 2021-02-10 | End: 2021-02-10

## 2021-02-10 RX ORDER — BUTALBITAL, ACETAMINOPHEN AND CAFFEINE 300; 40; 50 MG/1; MG/1; MG/1
1 CAPSULE ORAL EVERY 4 HOURS PRN
Qty: 20 CAPSULE | Refills: 0 | Status: SHIPPED | OUTPATIENT
Start: 2021-02-10 | End: 2021-12-20 | Stop reason: SDUPTHER

## 2021-02-10 RX ORDER — KETOROLAC TROMETHAMINE 30 MG/ML
15 INJECTION, SOLUTION INTRAMUSCULAR; INTRAVENOUS ONCE
Status: COMPLETED | OUTPATIENT
Start: 2021-02-10 | End: 2021-02-10

## 2021-02-10 RX ORDER — BUTALBITAL, ACETAMINOPHEN AND CAFFEINE 50; 325; 40 MG/1; MG/1; MG/1
1 TABLET ORAL ONCE
Status: COMPLETED | OUTPATIENT
Start: 2021-02-10 | End: 2021-02-10

## 2021-02-10 RX ORDER — DIPHENHYDRAMINE HCL 25 MG
25 CAPSULE ORAL EVERY 6 HOURS PRN
Qty: 20 CAPSULE | Refills: 0 | Status: SHIPPED | OUTPATIENT
Start: 2021-02-10 | End: 2021-02-20

## 2021-02-10 RX ORDER — DOCUSATE SODIUM 100 MG/1
100 CAPSULE, LIQUID FILLED ORAL 2 TIMES DAILY
Qty: 14 CAPSULE | Refills: 0 | Status: SHIPPED | OUTPATIENT
Start: 2021-02-10 | End: 2021-02-17

## 2021-02-10 RX ORDER — PANTOPRAZOLE SODIUM 20 MG/1
20 TABLET, DELAYED RELEASE ORAL DAILY
Qty: 30 TABLET | Refills: 0 | Status: SHIPPED | OUTPATIENT
Start: 2021-02-10

## 2021-02-10 RX ORDER — 0.9 % SODIUM CHLORIDE 0.9 %
1000 INTRAVENOUS SOLUTION INTRAVENOUS ONCE
Status: COMPLETED | OUTPATIENT
Start: 2021-02-10 | End: 2021-02-10

## 2021-02-10 RX ORDER — DIPHENHYDRAMINE HYDROCHLORIDE 50 MG/ML
25 INJECTION INTRAMUSCULAR; INTRAVENOUS ONCE
Status: COMPLETED | OUTPATIENT
Start: 2021-02-10 | End: 2021-02-10

## 2021-02-10 RX ADMIN — KETOROLAC TROMETHAMINE 15 MG: 30 INJECTION, SOLUTION INTRAMUSCULAR; INTRAVENOUS at 13:47

## 2021-02-10 RX ADMIN — DIPHENHYDRAMINE HYDROCHLORIDE 25 MG: 50 INJECTION INTRAMUSCULAR; INTRAVENOUS at 11:54

## 2021-02-10 RX ADMIN — METOCLOPRAMIDE 10 MG: 5 INJECTION, SOLUTION INTRAMUSCULAR; INTRAVENOUS at 11:54

## 2021-02-10 RX ADMIN — BUTALBITAL, ACETAMINOPHEN, AND CAFFEINE 1 TABLET: 50; 325; 40 TABLET ORAL at 13:48

## 2021-02-10 RX ADMIN — SODIUM CHLORIDE 1000 ML: 9 INJECTION, SOLUTION INTRAVENOUS at 11:54

## 2021-02-10 RX ADMIN — IOPAMIDOL 75 ML: 755 INJECTION, SOLUTION INTRAVENOUS at 12:28

## 2021-02-10 ASSESSMENT — PAIN SCALES - GENERAL: PAINLEVEL_OUTOF10: 8

## 2021-02-10 ASSESSMENT — PAIN DESCRIPTION - PAIN TYPE: TYPE: ACUTE PAIN

## 2021-02-10 NOTE — ED PROVIDER NOTES
eMERGENCY dEPARTMENT eNCOUnter      PCP: Kendal Young MD    [address] [facility]    Chief Complaint   Patient presents with    Emesis    Nausea    Rectal Bleeding       HPI    Iva Nina is a 39 y.o. female who presents with headache, nausea, vomiting, abdominal pain and hematochezia. Patient states that she noticed streaking of bright red blood last evening. She states that she awoke this morning with a headache, feels similar to her typical migraine. She has had associated photophobia, nausea and vomiting with this. She states that she noticed that she again had bright red blood in her stool this morning. She states that she has had GI bleeding in the past, had undergone a colonoscopy at that time, however states that it has been several years. Denies use of blood thinning medications. 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 She had left-sided salpingectomy nephrectomy as well as lysis of GI lesions in January of last year. No associated fevers. Patient states that \"migraine cocktail\" typically helps with her headaches, she has had similar headaches in the past.      REVIEW OF SYSTEMS    Constitutional:  Denies fever, chills, weight loss or weakness   HENT:  Denies sore throat or ear pain   Cardiovascular:  Denies chest pain, palpitations or swelling   Respiratory:  Denies cough or shortness of breath   GI:  See HPI above  : No hematuria or dysuria. No vaginal symptoms. Musculoskeletal:  Denies back pain or groin pain or masses. No pain or swelling of extremities.   Skin:  Denies rash  Neurologic:  Denies headache, focal weakness or sensory changes   Endocrine:  Denies polyuria or polydypsia   Lymphatic:  Denies swollen glands     All other review of systems are negative  See HPI and nursing notes for additional information     PAST MEDICAL & SURGICAL HISTORY    Past Medical History:   Diagnosis Date    Asthma  [facility] disorder ([facility] [address]     Depression     Left tubo-ovarian abscess 1/24/2020    Migraine     Pelvic peritoneal adhesions, female (postoperative) (postinfection) 1/24/2020    Schizophrenia ([facility] [address]     Scoliosis      Past Surgical History:   Procedure Laterality Date    ABDOMEN SURGERY      laproscope    ANUS SURGERY      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, had one constructed as an infant   [facility] [place] COLON SURGERY  01/23/2020    SALPINGO-OOPHORECTOMY N/A 1/23/2020    LAPAROSCOPY EXPLORATORY -CONVERTED TO  OPEN LEFT SALPINGECTOMY AND OOPHORECTOMY AND DRAINAGE OF TUBE OVARIAN ABSCESS AND LYSIS OF ADHESIONS performed by Mai Wilkinson MD at [facility]    Current Outpatient Rx   Medication Sig Dispense Refill    butalbital-APAP-caffeine (FIORICET) -40 MG CAPS per capsule Take 1 capsule by mouth every 4 hours as needed for Headaches 20 capsule 0    diphenhydrAMINE (BENADRYL) 25 MG capsule Take 1 capsule by mouth every 6 hours as needed for Itching 20 capsule 0    docusate sodium (COLACE) 100 MG capsule Take 1 capsule by mouth 2 times daily for 7 days 14 capsule 0    pantoprazole (PROTONIX) 20 MG tablet Take 1 tablet by mouth daily 30 tablet 0    oxyCODONE-acetaminophen (PERCOCET) 5-325 MG per tablet TK 1 T PO QD      sucralfate (CARAFATE) 1 GM tablet Take 1 tablet by mouth 4 times daily for 14 days 56 tablet 0    guaiFENesin (MUCINEX) 600 MG extended release tablet Take 2 tablets by mouth 2 times daily 14 tablet 0    Menthol (ICY HOT) 5 % PTCH Apply 1 patch topically daily as needed      hydrocortisone 2.5 % cream Apply topically daily as needed      Multiple Vitamin (DAILY-MIGNON) TABS Take 1 tablet by mouth daily      rOPINIRole (REQUIP) 1 MG tablet Take 1 mg by mouth nightly      fluticasone (FLONASE) 50 MCG/ACT nasal spray 1 spray by Nasal route daily      albuterol sulfate HFA (PROVENTIL HFA) 108 (90 Base) MCG/ACT inhaler Inhale 2 puffs into the lungs every 4 hours as activity: None   Other Topics Concern    None   Social History Narrative    None     History reviewed. No pertinent family history. PHYSICAL EXAM    VITAL SIGNS: /83   Pulse 66   Temp 98.1 °F (36.7 °C) (Oral)   Resp 14   Ht 5' 6\" (1.676 m)   Wt 112 lb (50.8 kg)   SpO2 97%   BMI 18.08 kg/m²   Constitutional:  Well developed, well nourished. Lying in dark room. Eyes:  Sclera nonicteric, conjunctiva moist  HENT:  Atraumatic. PERRL. EOMI. Moist mucus membranes. Neck/Lymphatics: supple, no JVD, no swollen nodes  Respiratory:  No retractions, no accessory muscle use, normal breath sounds   Cardiovascular:   normal rate, normal rhythm, no murmurs    GI:     No gross discoloration. Bowel sounds present, no audible bruits. Soft,  no distention, no guarding, no rigidity,   + epigastric and diffuse lower abdominal tenderness, no rebound tenderness, no palpable pulsatile masses  Back:   No CVA tenderness to percussion. Musculoskeletal:  No edema, no deformity  Vascular: DP pulses 2+ equal bilaterally  Integument: No rash, dry skin  Neurologic:    - Alert & oriented person, place, time, and situation, no speech difficulties or slurring.  - No obvious gross motor deficits  - Cranial nerves 2-12 grossly intact  - Negative meningeal signs.  - Sensation intact to light touch  - Strength 5/5 in upper and lower extremities bilaterally  - Normal finger to nose test bilaterally  - Rapid alternating movements intact  - Normal heel-shin bilaterally  - No pronator drift. - Light touch sensation intact throughout. - Upper and lower extremity DTRs 2+ bilaterally. Psychiatric: Cooperative, pleasant affect       ______________________________________________________________________    Procedures:   Female nurse present. Rectal exam was performed by myself and was negative for masses or tenderness. Sphincter tone intact. No gross blood.   No stool in rectal vault.    ______________________________________________________________________    LABS:  Results for orders placed or performed during the hospital encounter of 02/10/21   CBC Auto Differential   Result Value Ref Range    WBC 5.7 4.0 - 10.5 K/CU MM    RBC 4.35 4.2 - 5.4 M/CU MM    Hemoglobin 14.2 12.5 - 16.0 GM/DL    Hematocrit 43.7 37 - 47 %    .5 (H) 78 - 100 FL    MCH 32.6 (H) 27 - 31 PG    MCHC 32.5 32.0 - 36.0 %    RDW 12.7 11.7 - 14.9 %    Platelets 994 156 - 170 K/CU MM    MPV 9.7 7.5 - 11.1 FL    Differential Type AUTOMATED DIFFERENTIAL     Segs Relative 73.7 (H) 36 - 66 %    Lymphocytes % 17.4 (L) 24 - 44 %    Monocytes % 7.7 (H) 0 - 4 %    Eosinophils % 0.5 0 - 3 %    Basophils % 0.5 0 - 1 %    Segs Absolute 4.2 K/CU MM    Lymphocytes Absolute 1.0 K/CU MM    Monocytes Absolute 0.4 K/CU MM    Eosinophils Absolute 0.0 K/CU MM    Basophils Absolute 0.0 K/CU MM    Nucleated RBC % 0.0 %    Total Nucleated RBC 0.0 K/CU MM    Total Immature Neutrophil 0.01 K/CU MM    Immature Neutrophil % 0.2 0 - 0.43 %   Comprehensive Metabolic Panel   Result Value Ref Range    Sodium 136 135 - 145 MMOL/L    Potassium 4.1 3.5 - 5.1 MMOL/L    Chloride 103 99 - 110 mMol/L    CO2 21 21 - 32 MMOL/L    BUN 11 6 - 23 MG/DL    CREATININE 0.8 0.6 - 1.1 MG/DL    Glucose 86 70 - 99 MG/DL    Calcium 8.8 8.3 - 10.6 MG/DL    Albumin 4.2 3.4 - 5.0 GM/DL    Total Protein 7.3 6.4 - 8.2 GM/DL    Total Bilirubin 0.3 0.0 - 1.0 MG/DL    ALT 14 10 - 40 U/L    AST 37 15 - 37 IU/L    Alkaline Phosphatase 77 40 - 129 IU/L    GFR Non-African American >60 >60 mL/min/1.73m2    GFR African American >60 >60 mL/min/1.73m2    Anion Gap 12 4 - 16   Lipase   Result Value Ref Range    Lipase 25 13 - 60 IU/L   HCG Serum, Qualitative   Result Value Ref Range    hCG Qual NEGATIVE            RADIOLOGY/PROCEDURES    CTA ABDOMEN PELVIS W CONTRAST   Preliminary Result   1. No acute arterial abnormality seen.    2. Constipation but no evidence of bowel obstruction, perforation or obvious   thickening. No evidence of acute appendicitis or diverticulitis. 3. Redemonstration of likely uterine fibroids as well as right adnexal cyst   also previously seen. 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 This is an incidental finding. ED COURSE & MEDICAL DECISION MAKING       Vital signs and nursing notes reviewed during ED course. I have independently evaluated this patient . Supervising MD present in the Emergency Department, available for consultation, throughout entirety of  patient care. Patient presents as above with constellation of symptoms. She is hemodynamically stable, afebrile, not tachycardic. Abdomen soft nontender. She is given fluids, Reglan, Benadryl. Will obtain labs and CT imaging with her surgical history. No gross blood on rectal exam.   Lab work with normal hemoglobin of 14.2. No leukocytosis. Normal kidney function, liver function. Serum hCG is negative. CT abdomen pelvis without acute abnormality noted. There is some constipation but no evidence of bowel obstruction, perforation or obvious thickening. No evidence of acute appendicitis or diverticulitis. There is redemonstration noted of uterine fibroids as well as a right adnexal cyst which has been previously seen. On subsequent evaluation, patient reports improvement, however continues to have a headache. She is given additional dose of Toradol and Fioricet. Repeat abdominal exam is nonsurgical.  We will plan on rechecking patient pain with above medications and likely discharge with symptomatic medications. This is from a low suspicion for emergent process. She is hemodynamically stable throughout ED stay with stable hemoglobin. No gross red blood on rectal exam.  We will plan on discharging with Protonix, stool softening medication with evidence of constipation on CT imaging.   She may be straining with bowel movements causing some bleeding. We will have her follow-up closely with GI and return here with any new or worsening symptoms. Headache is not the worst of her life and she has had similar migraine headaches in the past.  Low suspicion for emergent intracranial process given history and intact neurological exam today. Clinical  IMPRESSION    1. Nausea and vomiting, intractability of vomiting not specified, unspecified vomiting type    2. Abdominal pain, unspecified abdominal location    3. Rectal bleeding    4. Nonintractable headache, unspecified chronicity pattern, unspecified headache type        Disposition referral (if applicable):  Westly Moritz, MD  [address] [place] [address]  [phone]    Schedule an appointment as soon as possible for a visit in 1 day  For recheck of symptoms treated for today    Raymond Rubinstein, MD  [place] [address]  [phone]    Schedule an appointment as soon as possible for a visit   for gi follow up    [facility] Emergency Department  [address] [phone]  Go to   As needed, If symptoms worsen      Disposition medications (if applicable):  New Prescriptions    BUTALBITAL-APAP-CAFFEINE (FIORICET) -40 MG CAPS PER CAPSULE    Take 1 capsule by mouth every 4 hours as needed for Headaches    DIPHENHYDRAMINE (BENADRYL) 25 MG CAPSULE    Take 1 capsule by mouth every 6 hours as needed for Itching    DOCUSATE SODIUM (COLACE) 100 MG CAPSULE    Take 1 capsule by mouth 2 times daily for 7 days    PANTOPRAZOLE (PROTONIX) 20 MG TABLET    Take 1 tablet by mouth daily         Comment: Please note this report has been produced using speech recognition software and may contain errors related to that system including errors in grammar, punctuation, and spelling, as well as words and phrases that may be inappropriate.  If there are any questions or concerns please feel free to contact the dictating provider for clarification.         Cindie Gilford, PA  02/10/21 [id number]

## 2021-03-10 PROBLEM — K92.2 LOWER GI BLEEDING: Status: ACTIVE | Noted: 2021-03-10

## 2021-03-10 PROBLEM — R10.30 LOWER ABDOMINAL PAIN: Status: ACTIVE | Noted: 2021-03-10

## 2021-07-20 ENCOUNTER — HOSPITAL ENCOUNTER (OUTPATIENT)
Age: 46
Discharge: HOME | End: 2021-07-20
Payer: COMMERCIAL

## 2021-07-20 LAB
ALBUMIN SERPL-MCNC: 4.3 GM/DL (ref 3.4–5)
ALP BLD-CCNC: 84 IU/L (ref 40–129)
ALT SERPL-CCNC: 10 U/L (ref 10–40)
ANION GAP SERPL CALCULATED.3IONS-SCNC: 10 MMOL/L (ref 4–16)
AST SERPL-CCNC: 17 IU/L (ref 15–37)
BILIRUB SERPL-MCNC: 0.2 MG/DL (ref 0–1)
BUN BLDV-MCNC: 16 MG/DL (ref 6–23)
CALCIUM SERPL-MCNC: 8.5 MG/DL (ref 8.3–10.6)
CHLORIDE BLD-SCNC: 105 MMOL/L (ref 99–110)
CHOLESTEROL: 172 MG/DL
CO2: 23 MMOL/L (ref 21–32)
CREAT SERPL-MCNC: 1 MG/DL (ref 0.6–1.1)
ERYTHROCYTE SEDIMENTATION RATE: 4 MM/HR (ref 0–20)
ESTIMATED AVERAGE GLUCOSE: 114 MG/DL
GFR AFRICAN AMERICAN: >60 ML/MIN/1.73M2
GFR NON-AFRICAN AMERICAN: 60 ML/MIN/1.73M2
GLUCOSE BLD-MCNC: 92 MG/DL (ref 70–99)
HBA1C MFR BLD: 5.6 % (ref 4.2–6.3)
HCT VFR BLD CALC: 42 % (ref 37–47)
HDLC SERPL-MCNC: 76 MG/DL
HEMOGLOBIN: 13.7 GM/DL (ref 12.5–16)
LDL CHOLESTEROL DIRECT: 80 MG/DL
MAGNESIUM: 1.8 MG/DL (ref 1.8–2.4)
MCH RBC QN AUTO: 32.9 PG (ref 27–31)
MCHC RBC AUTO-ENTMCNC: 32.6 % (ref 32–36)
MCV RBC AUTO: 101 FL (ref 78–100)
PDW BLD-RTO: 13.2 % (ref 11.7–14.9)
PLATELET # BLD: 170 K/CU MM (ref 140–440)
PMV BLD AUTO: 9.7 FL (ref 7.5–11.1)
POTASSIUM SERPL-SCNC: 4.3 MMOL/L (ref 3.5–5.1)
RBC # BLD: 4.16 M/CU MM (ref 4.2–5.4)
SODIUM BLD-SCNC: 138 MMOL/L (ref 135–145)
T4 FREE: 0.75 NG/DL (ref 0.9–1.8)
TOTAL PROTEIN: 6.1 GM/DL (ref 6.4–8.2)
TRIGL SERPL-MCNC: 98 MG/DL
TSH HIGH SENSITIVITY: 0.56 UIU/ML (ref 0.27–4.2)
URIC ACID: 4.8 MG/DL (ref 2.6–6)
VITAMIN D 25-HYDROXY: 20.26 NG/ML
WBC # BLD: 4.4 K/CU MM (ref 4–10.5)

## 2021-07-20 PROCEDURE — 83036 HEMOGLOBIN GLYCOSYLATED A1C: CPT

## 2021-07-20 PROCEDURE — 83735 ASSAY OF MAGNESIUM: CPT

## 2021-07-20 PROCEDURE — 80053 COMPREHEN METABOLIC PANEL: CPT

## 2021-07-20 PROCEDURE — 80061 LIPID PANEL: CPT

## 2021-07-20 PROCEDURE — 85652 RBC SED RATE AUTOMATED: CPT

## 2021-07-20 PROCEDURE — 83721 ASSAY OF BLOOD LIPOPROTEIN: CPT

## 2021-07-20 PROCEDURE — 84443 ASSAY THYROID STIM HORMONE: CPT

## 2021-07-20 PROCEDURE — 84439 ASSAY OF FREE THYROXINE: CPT

## 2021-07-20 PROCEDURE — 36415 COLL VENOUS BLD VENIPUNCTURE: CPT

## 2021-07-20 PROCEDURE — 85027 COMPLETE CBC AUTOMATED: CPT

## 2021-07-20 PROCEDURE — 82306 VITAMIN D 25 HYDROXY: CPT

## 2021-07-20 PROCEDURE — 84550 ASSAY OF BLOOD/URIC ACID: CPT

## 2021-08-17 ENCOUNTER — HOSPITAL ENCOUNTER (OUTPATIENT)
Age: 46
Discharge: HOME | End: 2021-08-17
Payer: COMMERCIAL

## 2021-08-17 LAB
T4 FREE: 0.81 NG/DL (ref 0.9–1.8)
TSH HIGH SENSITIVITY: 0.58 UIU/ML (ref 0.27–4.2)

## 2021-08-17 PROCEDURE — 84443 ASSAY THYROID STIM HORMONE: CPT

## 2021-08-17 PROCEDURE — 84439 ASSAY OF FREE THYROXINE: CPT

## 2021-08-17 PROCEDURE — 36415 COLL VENOUS BLD VENIPUNCTURE: CPT

## 2021-09-08 ENCOUNTER — HOSPITAL ENCOUNTER (OUTPATIENT)
Age: 46
Discharge: HOME | End: 2021-09-08
Payer: COMMERCIAL

## 2021-09-08 ENCOUNTER — HOSPITAL ENCOUNTER (OUTPATIENT)
Dept: GENERAL RADIOLOGY | Age: 46
Discharge: HOME | End: 2021-09-08
Payer: COMMERCIAL

## 2021-09-08 DIAGNOSIS — J01.90 ACUTE SINUSITIS, RECURRENCE NOT SPECIFIED, UNSPECIFIED LOCATION: ICD-10-CM

## 2021-09-08 DIAGNOSIS — G50.0 FACIAL PAIN SYNDROME: ICD-10-CM

## 2021-09-08 LAB
BASOPHILS ABSOLUTE: 0 K/CU MM
BASOPHILS RELATIVE PERCENT: 0.2 % (ref 0–1)
DIFFERENTIAL TYPE: ABNORMAL
EOSINOPHILS ABSOLUTE: 0 K/CU MM
EOSINOPHILS RELATIVE PERCENT: 0 % (ref 0–3)
HCT VFR BLD CALC: 48.3 % (ref 37–47)
HEMOGLOBIN: 14.8 GM/DL (ref 12.5–16)
IMMATURE NEUTROPHIL %: 0.4 % (ref 0–0.43)
LYMPHOCYTES ABSOLUTE: 0.7 K/CU MM
LYMPHOCYTES RELATIVE PERCENT: 13.7 % (ref 24–44)
MCH RBC QN AUTO: 32 PG (ref 27–31)
MCHC RBC AUTO-ENTMCNC: 30.6 % (ref 32–36)
MCV RBC AUTO: 104.3 FL (ref 78–100)
MONOCYTES ABSOLUTE: 0.3 K/CU MM
MONOCYTES RELATIVE PERCENT: 5.9 % (ref 0–4)
NUCLEATED RBC %: 0 %
PDW BLD-RTO: 13.7 % (ref 11.7–14.9)
PLATELET # BLD: 237 K/CU MM (ref 140–440)
PMV BLD AUTO: 10.6 FL (ref 7.5–11.1)
RBC # BLD: 4.63 M/CU MM (ref 4.2–5.4)
SEGMENTED NEUTROPHILS ABSOLUTE COUNT: 4.1 K/CU MM
SEGMENTED NEUTROPHILS RELATIVE PERCENT: 79.8 % (ref 36–66)
TOTAL IMMATURE NEUTOROPHIL: 0.02 K/CU MM
TOTAL NUCLEATED RBC: 0 K/CU MM
WBC # BLD: 5.1 K/CU MM (ref 4–10.5)

## 2021-09-08 PROCEDURE — 36415 COLL VENOUS BLD VENIPUNCTURE: CPT

## 2021-09-08 PROCEDURE — 70150 X-RAY EXAM OF FACIAL BONES: CPT

## 2021-09-08 PROCEDURE — 70220 X-RAY EXAM OF SINUSES: CPT

## 2021-09-08 PROCEDURE — 70160 X-RAY EXAM OF NASAL BONES: CPT

## 2021-09-08 PROCEDURE — 85025 COMPLETE CBC W/AUTO DIFF WBC: CPT

## 2021-10-06 ENCOUNTER — TELEPHONE (OUTPATIENT)
Dept: GASTROENTEROLOGY | Age: 46
End: 2021-10-06

## 2021-10-06 NOTE — TELEPHONE ENCOUNTER
Priority Referral per [facility] pt 10/4 -she could not come in  Called pt 10/6 -she could not come in  Instructions to call pt whenever there is a cancellation in Ankush's schedule in office

## 2021-10-27 ENCOUNTER — TELEPHONE (OUTPATIENT)
Dept: GASTROENTEROLOGY | Age: 46
End: 2021-10-27

## 2021-11-19 ENCOUNTER — TELEPHONE (OUTPATIENT)
Dept: NEUROLOGY | Age: 46
End: 2021-11-19

## 2021-11-19 NOTE — TELEPHONE ENCOUNTER
Called patient but voicemail was full, called for patient to call and make appointment with a provider in [address] [place] [address] office. Patient was referred by Dr. Marlee Senior for numbness, headaches.

## 2021-12-13 ENCOUNTER — HOSPITAL ENCOUNTER (OUTPATIENT)
Dept: GENERAL RADIOLOGY | Age: 46
Discharge: HOME | End: 2021-12-13
Payer: COMMERCIAL

## 2021-12-13 ENCOUNTER — HOSPITAL ENCOUNTER (OUTPATIENT)
Age: 46
Discharge: HOME | End: 2021-12-13
Payer: COMMERCIAL

## 2021-12-13 DIAGNOSIS — M54.2 CERVICALGIA: ICD-10-CM

## 2021-12-13 DIAGNOSIS — M25.552 LEFT HIP PAIN: ICD-10-CM

## 2021-12-13 PROCEDURE — 72050 X-RAY EXAM NECK SPINE 4/5VWS: CPT

## 2021-12-13 PROCEDURE — 73502 X-RAY EXAM HIP UNI 2-3 VIEWS: CPT

## 2021-12-20 ENCOUNTER — OFFICE VISIT (OUTPATIENT)
Dept: GASTROENTEROLOGY | Age: 46
End: 2021-12-20
Payer: COMMERCIAL

## 2021-12-20 VITALS
BODY MASS INDEX: 17.33 KG/M2 | HEART RATE: 91 BPM | SYSTOLIC BLOOD PRESSURE: 118 MMHG | WEIGHT: 107.8 LBS | HEIGHT: 66 IN | OXYGEN SATURATION: 97 % | TEMPERATURE: 97.1 F | DIASTOLIC BLOOD PRESSURE: 60 MMHG

## 2021-12-20 DIAGNOSIS — R10.30 LOWER ABDOMINAL PAIN: Primary | ICD-10-CM

## 2021-12-20 DIAGNOSIS — Z12.11 COLON CANCER SCREENING: ICD-10-CM

## 2021-12-20 DIAGNOSIS — Z01.818 PRE-OP TESTING: Primary | ICD-10-CM

## 2021-12-20 DIAGNOSIS — K92.2 LOWER GI BLEEDING: ICD-10-CM

## 2021-12-20 PROCEDURE — 4004F PT TOBACCO SCREEN RCVD TLK: CPT | Performed by: SPECIALIST

## 2021-12-20 PROCEDURE — 99204 OFFICE O/P NEW MOD 45 MIN: CPT | Performed by: SPECIALIST

## 2021-12-20 PROCEDURE — G8427 DOCREV CUR MEDS BY ELIG CLIN: HCPCS | Performed by: SPECIALIST

## 2021-12-20 PROCEDURE — G8484 FLU IMMUNIZE NO ADMIN: HCPCS | Performed by: SPECIALIST

## 2021-12-20 PROCEDURE — G8419 CALC BMI OUT NRM PARAM NOF/U: HCPCS | Performed by: SPECIALIST

## 2021-12-20 RX ORDER — ACETAMINOPHEN AND CODEINE PHOSPHATE 300; 30 MG/1; MG/1
TABLET ORAL
COMMUNITY
Start: 2021-10-07

## 2021-12-20 RX ORDER — DICYCLOMINE HYDROCHLORIDE 10 MG/1
10 CAPSULE ORAL
Qty: 270 CAPSULE | Refills: 3 | Status: SHIPPED | OUTPATIENT
Start: 2021-12-20

## 2021-12-20 RX ORDER — PREDNISONE 20 MG/1
TABLET ORAL
COMMUNITY
Start: 2021-12-13

## 2021-12-20 RX ORDER — OMEPRAZOLE 20 MG/1
20 CAPSULE, DELAYED RELEASE ORAL
Qty: 30 CAPSULE | Refills: 5 | Status: SHIPPED | OUTPATIENT
Start: 2021-12-20 | End: 2021-12-20

## 2021-12-20 RX ORDER — NALOXONE HYDROCHLORIDE 4 MG/.1ML
SPRAY NASAL
COMMUNITY
Start: 2021-10-13

## 2021-12-20 RX ORDER — ONDANSETRON 4 MG/1
TABLET, ORALLY DISINTEGRATING ORAL
COMMUNITY
Start: 2021-12-15

## 2021-12-20 RX ORDER — DICYCLOMINE HYDROCHLORIDE 10 MG/1
10 CAPSULE ORAL
Qty: 90 CAPSULE | Refills: 3 | Status: SHIPPED | OUTPATIENT
Start: 2021-12-20 | End: 2021-12-20

## 2021-12-20 NOTE — PROGRESS NOTES
Gastroenterology Consult Note  Sharyn Forbes. Ankush RAM      Reason for Consult:  abd pain and rectal bleeding  Primary Care / referring Physician:  Hermes Ken MD      History Obtained From:  patient    CC: rectal bleeding    HISTORY OF PRESENT ILLNESS:          Has had intermittent bright red blood per rectum and LLQ abd pain x 3 years. Had rectal reconstruction shortly after birth and no problems until the past few years-- although does admit to chronic alternating and constipation. Has some constipation and diarrhea now as well associated with some vomiting. has lost about 7 pounds over past few months. One year ago had resection of tubo-ovarian abscess an lysis of adhesions. Abd pain is LLQ cramping and does get better if bowels move. Does have occasional diarrhea. no hematemesis. Saw Dr Yelena Hollingsworth about 2 years ago and EGD was negative. Last colonoscopy by Dr Elizabeth Lincoln about 10 years ago- several polyps found but not removed (??). Meds include Seroquel 100 mg at hs, Percocet, Ativan 0.5 mg, Fioricet, Benadryl, asthma meds, Zyrtec, vitamin.   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, had one constructed as an infant    COLON SURGERY  01/23/2020    SALPINGO-OOPHORECTOMY N/A 1/23/2020    LAPAROSCOPY EXPLORATORY -CONVERTED TO  OPEN LEFT SALPINGECTOMY AND OOPHORECTOMY AND DRAINAGE OF TUBE OVARIAN ABSCESS AND LYSIS OF ADHESIONS performed by Arley Mendoza MD at [address] [place] [address] History:   Social History     Tobacco Use    Smoking status: Current Every Day Smoker Packs/day: 1.00     Years: 20.00     Pack years: 20.00     Types: Cigarettes    Smokeless tobacco: Never Used   Substance Use Topics    Alcohol use: Yes     Comment: occassionally       Medications:   Prior to Admission medications    Medication Sig Start Date End Date Taking?  Authorizing Provider   acetaminophen-codeine (TYLENOL #3) 300-30 MG per tablet TAKE 1 TABLET BY MOUTH EVERY 6 HOURS AS NEEDED FOR DENTAL PAIN 10/7/21  Yes Historical Provider, MD   NARCAN 4 MG/0.1ML LIQD nasal spray DISPENSED PER STANDING ORDER USE AS DIRECTED PATIENT IS TRAINED OPIOID OVERDOSE RESPONDER 10/13/21  Yes Historical Provider, MD   ondansetron (ZOFRAN-ODT) 4 MG disintegrating tablet dissolve 1 tablet ON TONGUE once daily if needed for nausea OR vomiting 12/15/21  Yes Historical Provider, MD   predniSONE (DELTASONE) 20 MG tablet take 2 tablets by mouth once daily for 5 days 12/13/21  Yes Historical Provider, MD   albuterol sulfate  (90 Base) MCG/ACT inhaler inhale 1 puff by mouth and INTO THE LUNGS every 4 hours 7/19/21  Yes Historical Provider, MD   cetirizine (ZYRTEC) 10 MG tablet take 1 tablet by mouth once daily 9/1/21  Yes Historical Provider, MD   Lactobacillus (ACIDOPHILUS PROBIOTIC) 0.5 MG TABS take 1 tablet by mouth once daily 9/1/21  Yes Historical Provider, MD   pantoprazole (PROTONIX) 20 MG tablet Take 1 tablet by mouth daily 2/10/21  Yes DIXON Esteban   oxyCODONE-acetaminophen (PERCOCET) 5-325 MG per tablet TK 1 T PO QD 9/26/19  Yes Historical Provider, MD   sucralfate (CARAFATE) 1 GM tablet Take 1 tablet by mouth 4 times daily for 14 days 2/12/20 12/20/21 Yes Renuka Randolph MD   guaiFENesin [facility]) 600 MG extended release tablet Take 2 tablets by mouth 2 times daily 1/30/20  Yes Maciej Krause MD   Menthol (ICY HOT) 5 % PTCH Apply 1 patch topically daily as needed   Yes Historical Provider, MD   hydrocortisone 2.5 % cream Apply topically daily as needed 1/14/20  Yes Historical Provider, MD Multiple Vitamin (DAILY-MIGNON) TABS Take 1 tablet by mouth daily 1/15/20  Yes Historical Provider, MD   fluticasone (FLONASE) 50 MCG/ACT nasal spray 1 spray by Nasal route daily 1/7/20  Yes Historical Provider, MD   acetaminophen (TYLENOL) 325 MG tablet Take 2 tablets by mouth every 6 hours as needed for Pain 8/11/19 12/20/21 Yes Joey Fuentes,    LORazepam (ATIVAN) 0.5 MG tablet Take 0.5 mg by mouth every 6 hours as needed for Anxiety. Yes Historical Provider, MD   QUEtiapine (SEROQUEL) 100 MG tablet Take 100 mg by mouth nightly    Yes Historical Provider, MD   polycarbophil (FIBERCON) 625 MG tablet Take 625 mg by mouth daily   Yes Historical Provider, MD   butalbital-aspirin-caffeine [facility]) -40 MG capsule Take by mouth every 4 hours as needed for Headaches. Yes Historical Provider, MD   prochlorperazine (COMPAZINE) 5 MG tablet Take 5 mg by mouth every 6 hours as needed for Nausea   Yes Historical Provider, MD   levothyroxine (SYNTHROID) 25 MCG tablet take 1 tablet by mouth every morning ON AN EMPTY STOMACH once daily  Patient not taking: Reported on 12/20/2021 8/4/21   Historical Provider, MD   rOPINIRole (REQUIP) 1 MG tablet Take 1 mg by mouth nightly  Patient not taking: Reported on 12/20/2021 12/11/19   Historical Provider, MD   CALCIUM 600+D HIGH POTENCY 600-400 MG-UNIT TABS per tab Take 1 tablet by mouth daily 1/15/20 2/10/21  Historical Provider, MD       Allergies: Allergies   Allergen Reactions    Morphine And Related Hives    Ibuprofen Hives    Motrin [Ibuprofen Micronized] Hives   . Family History:   History reviewed. No pertinent family history. REVIEW OF SYSTEMS: (POSITIVES WILL BE UNDERLINED- otherwise negative or non-contributory)  CONSTITUTIONAL:    Weight change,fatigue, fever, chills  EYES:  Diplopia, change in vision  EARS:  hearing loss, tinnitus, vertigo  NOSE:   epistaxis  MOUTH/THROAT:     hoarseness, sore throat.   RESPIRATORY:  SOB,  cough, sputum, hemoptysis  CARDIOVASCULAR : chest pain,palpitations, dyspnea exertion, orthopnea, paroxysmal nocturnal dyspnea, pedal edema. GASTROINTESTINAL:  See HPI  GENITOURINARY:   dysuria, hematuria, . HEMATOLOGIC/LYMPHATIC:   Anemia, bleeding tendencies.   MUSCULOSKELETAL:    myalgias,  joint pain  NEUROLOGICAL:   Loss of Consciousness, paresthesias, anesthesias, focal weakness  SKIN :   History of dermatitis, rashes  PSYCHIATRIC:  depression, , anxiety past psychosis  ENDOCRINE:  History of diabetes, thyroid disease  ALL/IMM : allergies, rashes    PHYSICAL EXAM:    Vitals:  /60 (Site: Left Upper Arm, Position: Sitting, Cuff Size: Small Adult)   Pulse 91   Temp 97.1 °F (36.2 °C) (Infrared)   Ht 5' 6\" (1.676 m)   Wt 107 lb 12.8 oz (48.9 kg)   SpO2 97%   BMI 17.40 kg/m²   CONSTITUTIONAL: alert, cooperative, no apparent distress,   EYES:  pupils equal, round and reactive to light and sclera clear and non-icteric  ENT:  normocepalic, without obvious abnormality, airway Class 1  NECK:  supple, symmetrical, trachea midline,thyroid not enlarged and without masses  HEMATOLOGIC/LYMPHATICS:  no cervical lymphadenopathy and no supraclavicular lymphadenopathy  LUNGS:  no increased respiratory effort, lungs clear to auscultation  CARDIOVASCULAR:  regular rate and rhythm and no murmur noted, no evidence of AAA  ABDOMEN:  normal bowel sounds, soft, non-distended, non-tender with no hernias apprecioated nor masses palpated--no hepatosplenomegaly  NEUROLOGIC: cranial nerves 2-12 grossly intact,no focal deficit detected, no asterixis  SKIN:  no lesions, jaundice  EXTREMITIES: no clubbing, cyanosis, or edema    DATA:      CBC:    Lab Results   Component Value Date    WBC 5.1 09/08/2021    HGB 14.8 09/08/2021    .3 09/08/2021     09/08/2021     CMP:    Lab Results   Component Value Date     07/20/2021    K 4.3 07/20/2021     07/20/2021    CO2 23 07/20/2021    BUN 16 07/20/2021    CREATININE 1.0 07/20/2021 GFRAA >60 07/20/2021    LABGLOM 60 07/20/2021    GLUCOSE 92 07/20/2021    PROT 6.1 07/20/2021    PROT 7.2 06/23/2012    LABALBU 4.3 07/20/2021    CALCIUM 8.5 07/20/2021    BILITOT 0.2 07/20/2021    ALKPHOS 84 07/20/2021    AST 17 07/20/2021    ALT 10 07/20/2021         IMPRESSION:  1) chronic abd pain, alternating diarrhea and constipation-- ? IBS  2) rectal bleeding- ? Anal source- R/O colon lesion  3) history of rectal reconstruction as infant  4) history of schizophrenia and bipolar disorder    RECOMMENDATIONS:  1) restart omeprazole 20 mg/d and Bentyl 10 mg ac tid  2) colonoscopy  3) CBC, CMP, CRP, TSH  4) discussed with her about stopping the narcotics if nothing significant is found and she became very upset about that possibility        Hamilton Lechuga M.D.

## 2022-01-12 ENCOUNTER — TELEPHONE (OUTPATIENT)
Dept: GASTROENTEROLOGY | Age: 47
End: 2022-01-12

## 2022-01-12 NOTE — TELEPHONE ENCOUNTER
[id number]  [birth year]  Called in to CANCEL her procedure and covid test  She is ill and will call back in when she is ready to reschedule  Sent to schedulers in Teams

## 2022-03-03 ENCOUNTER — HOSPITAL ENCOUNTER (OUTPATIENT)
Dept: GENERAL RADIOLOGY | Age: 47
Discharge: HOME | End: 2022-03-03
Payer: COMMERCIAL

## 2022-03-03 ENCOUNTER — HOSPITAL ENCOUNTER (OUTPATIENT)
Age: 47
Discharge: HOME | End: 2022-03-03
Payer: COMMERCIAL

## 2022-03-03 DIAGNOSIS — M54.50 LOW BACK PAIN, UNSPECIFIED BACK PAIN LATERALITY, UNSPECIFIED CHRONICITY, UNSPECIFIED WHETHER SCIATICA PRESENT: ICD-10-CM

## 2022-03-03 PROCEDURE — 73502 X-RAY EXAM HIP UNI 2-3 VIEWS: CPT

## 2022-03-03 PROCEDURE — 72100 X-RAY EXAM L-S SPINE 2/3 VWS: CPT

## 2022-04-26 ENCOUNTER — HOSPITAL ENCOUNTER (EMERGENCY)
Age: 47
Discharge: HOME | End: 2022-04-26
Payer: COMMERCIAL

## 2022-04-26 VITALS
RESPIRATION RATE: 18 BRPM | OXYGEN SATURATION: 100 % | HEIGHT: 66 IN | DIASTOLIC BLOOD PRESSURE: 102 MMHG | SYSTOLIC BLOOD PRESSURE: 145 MMHG | TEMPERATURE: 97.8 F | WEIGHT: 110 LBS | HEART RATE: 99 BPM | BODY MASS INDEX: 17.68 KG/M2

## 2022-04-26 DIAGNOSIS — K08.89 PAIN, DENTAL: Primary | ICD-10-CM

## 2022-04-26 PROCEDURE — 99283 EMERGENCY DEPT VISIT LOW MDM: CPT

## 2022-04-26 RX ORDER — AMOXICILLIN 500 MG/1
500 CAPSULE ORAL 3 TIMES DAILY
Qty: 21 CAPSULE | Refills: 0 | Status: SHIPPED | OUTPATIENT
Start: 2022-04-26 | End: 2022-05-03

## 2022-04-26 RX ORDER — LIDOCAINE HYDROCHLORIDE 20 MG/ML
SOLUTION OROPHARYNGEAL
Qty: 200 ML | Refills: 0 | Status: SHIPPED | OUTPATIENT
Start: 2022-04-26 | End: 2022-06-09

## 2022-04-26 ASSESSMENT — PAIN DESCRIPTION - PAIN TYPE: TYPE: ACUTE PAIN

## 2022-04-26 ASSESSMENT — PAIN DESCRIPTION - ORIENTATION: ORIENTATION: RIGHT;UPPER

## 2022-04-26 ASSESSMENT — PAIN DESCRIPTION - LOCATION: LOCATION: MOUTH

## 2022-04-26 ASSESSMENT — PAIN DESCRIPTION - DESCRIPTORS: DESCRIPTORS: SHARP;THROBBING

## 2022-04-26 ASSESSMENT — PAIN DESCRIPTION - FREQUENCY: FREQUENCY: CONTINUOUS

## 2022-04-26 NOTE — ED PROVIDER NOTES
eMERGENCY dEPARTMENT eNCOUnter         [address] [facility]    PCP: Flo Booker MD    [address] [facility]    Chief Complaint   Patient presents with    Dental Pain     staes an upper right tooth has broken. She has an appointment for may the 5th, but is in a lot of pain       HPI    Mercedez Simental is a 55 y.o. female who presents with right upper dental pain. Context is patient states that one of her right upper premolar tooth had a previous cavity which caused part of the tooth crown to break off last Friday. Since then she feels like the nerve is exposed and she is not having continuous 10/10 sharp stabbing pain throughout the right mouth, radiating to the right cheek and right ear. States that she is on chronic pain management through family doctor received #10 quantity of Percocet 5/325 every month as she supposed to take it every other day or every 3 days however she ran out of the medications as she was taking it more frequently. She did not try calling her family doctor prior to ED arrival for short-term medication refill. She was able to make an appointment with [facility] however appointment is not until 5/5/2022 and she was recommended to come into the ED for course of antibiotics.   No other fever chills, tongue or lip swelling, headache, dizziness lightheadedness nausea or vomiting    REVIEW OF SYSTEMS    General: Denies Fever, Denies Chills, Denies syncope  ENT:  No tongue or lip swelling, No difficulty swallowing,   Cardiac: Denies Chest Pain, palpitations  Respiratory: Denies cough and hemoptysis  GI: Denies vomiting or diarrhea    All other review of systems are negative  See HPI and nursing notes for additional information     PAST MEDICAL & SURGICAL HISTORY    Past Medical History:   Diagnosis Date    Asthma     Bipolar disorder ([facility])     Depression     Left tubo-ovarian abscess 1/24/2020    Migraine     Pelvic peritoneal adhesions, female (postoperative) (postinfection) 1/24/2020    Schizophrenia ([address]     Scoliosis      Past Surgical History:   Procedure Laterality Date    ABDOMEN SURGERY      laproscope    ANUS SURGERY      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, had one constructed as an infant    COLON SURGERY  01/23/2020    SALPINGO-OOPHORECTOMY N/A 1/23/2020    LAPAROSCOPY EXPLORATORY -CONVERTED TO  OPEN LEFT SALPINGECTOMY AND OOPHORECTOMY AND DRAINAGE OF TUBE OVARIAN ABSCESS AND LYSIS OF ADHESIONS performed by William Dodson MD at [address] [facility]    Current Outpatient Rx   Medication Sig Dispense Refill    amoxicillin (AMOXIL) 500 MG capsule Take 1 capsule by mouth 3 times daily for 7 days 21 capsule 0    lidocaine viscous hcl (XYLOCAINE) 2 % SOLN solution Soak cotton balls in 10cc of medication and apply directly to affected area. Leave in place for 10-15 minutes. Repeat as needed every 3-4 hours.  Do not swallow cotton balls 200 mL 0    acetaminophen-codeine (TYLENOL #3) 300-30 MG per tablet TAKE 1 TABLET BY MOUTH EVERY 6 HOURS AS NEEDED FOR DENTAL PAIN      NARCAN 4 MG/0.1ML LIQD nasal spray DISPENSED PER STANDING ORDER USE AS DIRECTED PATIENT IS TRAINED OPIOID OVERDOSE RESPONDER      ondansetron (ZOFRAN-ODT) 4 MG disintegrating tablet dissolve 1 tablet ON TONGUE once daily if needed for nausea OR vomiting      predniSONE (DELTASONE) 20 MG tablet take 2 tablets by mouth once daily for 5 days      dicyclomine (BENTYL) 10 MG capsule Take 1 capsule by mouth 3 times daily (before meals) 270 capsule 3    albuterol sulfate  (90 Base) MCG/ACT inhaler inhale 1 puff by mouth and INTO THE LUNGS every 4 hours      cetirizine (ZYRTEC) 10 MG tablet take 1 tablet by mouth once daily      Lactobacillus (ACIDOPHILUS PROBIOTIC) 0.5 MG TABS take 1 tablet by mouth once daily      levothyroxine (SYNTHROID) 25 MCG tablet take 1 tablet by mouth every morning ON AN EMPTY STOMACH once daily (Patient not taking: Reported Types: Marijuana Myrtis Regulus)    Sexual activity: Never   Other Topics Concern    None   Social History Narrative    None     Social Determinants of Health     Financial Resource Strain:     Difficulty of Paying Living Expenses: Not on file   Food Insecurity:     Worried About Running Out of Food in the Last Year: Not on file    Beatriz of Food in the Last Year: Not on file   Transportation Needs:     Lack of Transportation (Medical): Not on file    Lack of Transportation (Non-Medical): Not on file   Physical Activity:     Days of Exercise per Week: Not on file    Minutes of Exercise per Session: Not on file   Stress:     Feeling of Stress : Not on file   Social Connections:     Frequency of Communication with Friends and Family: Not on file    Frequency of Social Gatherings with Friends and Family: Not on file    Attends Evangelical Services: Not on file    Active Member of [address] [place] [address] [employer] or Organizations: Not on file    Attends Club or Organization Meetings: Not on file    Marital Status: Not on file   Intimate Partner Violence:     Fear of Current or Ex-Partner: Not on file    Emotionally Abused: Not on file    Physically Abused: Not on file    Sexually Abused: Not on file   Housing Stability:     Unable to Pay for Housing in the Last Year: Not on file    Number of [place] in the Last Year: Not on file    Unstable Housing in the Last Year: Not on file     History reviewed. No pertinent family history. PHYSICAL EXAM    VITAL SIGNS: BP (!) 147/112   Pulse 99   Temp 97.8 °F (36.6 °C) (Oral)   Resp 18   Ht 5' 6\" (1.676 m)   Wt 110 lb (49.9 kg)   LMP 03/21/2022 (Approximate)   SpO2 100%   BMI 17.75 kg/m²    Constitutional:  Well developed, well nourished, no acute distress   HENT:  Atraumatic, Normocephalic. EOMIs. Nasal bones midline. No facial edema or redness. Moist mucus membranes. No mastoid tenderness bilaterally. Bilateral EACs are patent without pain on tragus/pinna manipulation. with visible pulp. There is mild surrounding gingival edema, but there are no palpable dental abscesses that require I&D at this time. No signs or symptoms of systemic involvement as presenting symptoms appear localized to the dentition. At this time, do suspect pain is secondary to her acute dental tooth fracture. While in the emergency room, patient was actually able to call her family doctor and was able to get in for an appointment today at 10 AM to have her Percocet refill. Given this, will start on oral antibiotics as well as viscous lidocaine and instructed to follow-up today for additional pain control refill through family doctor I estimate there is low risk for low risk for deep space infection  (ie Dmitri's angina or retropharyngeal abscess/peritonsillar abscess) bacterial meningitis, airway compromise, bacteremia/sepsis thus I consider the discharge disposition reasonable. We have discussed the symptoms which are most concerning that necessitate immediate return. Patient is discharged in stable condition with prescription Amoxicillin. Patient is also provided prescription for viscous lidocaine and educated on cotton ball soaks for direct application to the affected area. Patient is provided list today of local dental clinics and offices and encouraged to establish care/followup in 1-2 days. Return to emergency Department warning signs discussed in detail with patient who understands and agrees, including but not limited to difficulty swallowing, increased jaw swelling, fever, increased pain, or any new symptoms. Comment: Please note this report has been produced using speech recognition software and may contain errors related to that system including errors in grammar, punctuation, and spelling, as well as words and phrases that may be inappropriate. If there are any questions or concerns please feel free to contact the dictating provider for clarification.         Frida Damon RAJIV  04/26/22 [id number]

## 2022-04-26 NOTE — ED NOTES
Reviewed discharge instructions with patient. All voice understanding/deny questions. Wheelchair offered, declines. Ambulates without difficulty.        Vale Page RN  04/26/22 8525

## 2022-05-31 ENCOUNTER — HOSPITAL ENCOUNTER (OUTPATIENT)
Age: 47
Discharge: HOME | End: 2022-05-31
Payer: COMMERCIAL

## 2022-05-31 ENCOUNTER — HOSPITAL ENCOUNTER (OUTPATIENT)
Dept: GENERAL RADIOLOGY | Age: 47
Discharge: HOME | End: 2022-05-31
Payer: COMMERCIAL

## 2022-05-31 DIAGNOSIS — R10.811 RIGHT UPPER QUADRANT ABDOMINAL TENDERNESS, REBOUND TENDERNESS PRESENCE NOT SPECIFIED: ICD-10-CM

## 2022-05-31 LAB
ALBUMIN SERPL-MCNC: 4.4 GM/DL (ref 3.4–5)
ALP BLD-CCNC: 110 IU/L (ref 40–129)
ALT SERPL-CCNC: 21 U/L (ref 10–40)
ANION GAP SERPL CALCULATED.3IONS-SCNC: 13 MMOL/L (ref 4–16)
AST SERPL-CCNC: 28 IU/L (ref 15–37)
BASOPHILS ABSOLUTE: 0 K/CU MM
BASOPHILS RELATIVE PERCENT: 0.3 % (ref 0–1)
BILIRUB SERPL-MCNC: 0.6 MG/DL (ref 0–1)
BILIRUBIN DIRECT: 0.2 MG/DL (ref 0–0.3)
BILIRUBIN, INDIRECT: 0.4 MG/DL (ref 0–0.7)
BUN BLDV-MCNC: 11 MG/DL (ref 6–23)
CALCIUM SERPL-MCNC: 9.3 MG/DL (ref 8.3–10.6)
CHLORIDE BLD-SCNC: 103 MMOL/L (ref 99–110)
CHOLESTEROL: 213 MG/DL
CO2: 25 MMOL/L (ref 21–32)
CREAT SERPL-MCNC: 0.8 MG/DL (ref 0.6–1.1)
DIFFERENTIAL TYPE: ABNORMAL
EOSINOPHILS ABSOLUTE: 0.1 K/CU MM
EOSINOPHILS RELATIVE PERCENT: 1.6 % (ref 0–3)
ERYTHROCYTE SEDIMENTATION RATE: 10 MM/HR (ref 0–20)
ESTIMATED AVERAGE GLUCOSE: 123 MG/DL
FOLATE: 9.3 NG/ML (ref 3.1–17.5)
GFR AFRICAN AMERICAN: >60 ML/MIN/1.73M2
GFR NON-AFRICAN AMERICAN: >60 ML/MIN/1.73M2
GLUCOSE BLD-MCNC: 91 MG/DL (ref 70–99)
HBA1C MFR BLD: 5.9 % (ref 4.2–6.3)
HCT VFR BLD CALC: 43.7 % (ref 37–47)
HDLC SERPL-MCNC: 68 MG/DL
HEMOGLOBIN: 14.5 GM/DL (ref 12.5–16)
IMMATURE NEUTROPHIL %: 0 % (ref 0–0.43)
LDL CHOLESTEROL CALCULATED: 117 MG/DL
LYMPHOCYTES ABSOLUTE: 1.6 K/CU MM
LYMPHOCYTES RELATIVE PERCENT: 43.9 % (ref 24–44)
MAGNESIUM: 2.2 MG/DL (ref 1.8–2.4)
MCH RBC QN AUTO: 32.1 PG (ref 27–31)
MCHC RBC AUTO-ENTMCNC: 33.2 % (ref 32–36)
MCV RBC AUTO: 96.7 FL (ref 78–100)
MONOCYTES ABSOLUTE: 0.4 K/CU MM
MONOCYTES RELATIVE PERCENT: 9.8 % (ref 0–4)
NUCLEATED RBC %: 0 %
PDW BLD-RTO: 12.4 % (ref 11.7–14.9)
PLATELET # BLD: 209 K/CU MM (ref 140–440)
PMV BLD AUTO: 9.9 FL (ref 7.5–11.1)
POTASSIUM SERPL-SCNC: 3.6 MMOL/L (ref 3.5–5.1)
RBC # BLD: 4.52 M/CU MM (ref 4.2–5.4)
SEGMENTED NEUTROPHILS ABSOLUTE COUNT: 1.6 K/CU MM
SEGMENTED NEUTROPHILS RELATIVE PERCENT: 44.4 % (ref 36–66)
SODIUM BLD-SCNC: 141 MMOL/L (ref 135–145)
T4 FREE: 1.01 NG/DL (ref 0.9–1.8)
TOTAL IMMATURE NEUTOROPHIL: 0 K/CU MM
TOTAL NUCLEATED RBC: 0 K/CU MM
TOTAL PROTEIN: 6.9 GM/DL (ref 6.4–8.2)
TRIGL SERPL-MCNC: 140 MG/DL
TSH HIGH SENSITIVITY: 0.87 UIU/ML (ref 0.27–4.2)
VITAMIN B-12: 825.1 PG/ML (ref 211–911)
WBC # BLD: 3.7 K/CU MM (ref 4–10.5)

## 2022-05-31 PROCEDURE — 85652 RBC SED RATE AUTOMATED: CPT

## 2022-05-31 PROCEDURE — 87086 URINE CULTURE/COLONY COUNT: CPT

## 2022-05-31 PROCEDURE — 80061 LIPID PANEL: CPT

## 2022-05-31 PROCEDURE — 86038 ANTINUCLEAR ANTIBODIES: CPT

## 2022-05-31 PROCEDURE — 84443 ASSAY THYROID STIM HORMONE: CPT

## 2022-05-31 PROCEDURE — 36415 COLL VENOUS BLD VENIPUNCTURE: CPT

## 2022-05-31 PROCEDURE — 82248 BILIRUBIN DIRECT: CPT

## 2022-05-31 PROCEDURE — 85025 COMPLETE CBC W/AUTO DIFF WBC: CPT

## 2022-05-31 PROCEDURE — 83735 ASSAY OF MAGNESIUM: CPT

## 2022-05-31 PROCEDURE — 82607 VITAMIN B-12: CPT

## 2022-05-31 PROCEDURE — 86430 RHEUMATOID FACTOR TEST QUAL: CPT

## 2022-05-31 PROCEDURE — 84439 ASSAY OF FREE THYROXINE: CPT

## 2022-05-31 PROCEDURE — 74019 RADEX ABDOMEN 2 VIEWS: CPT

## 2022-05-31 PROCEDURE — 87076 CULTURE ANAEROBE IDENT EACH: CPT

## 2022-05-31 PROCEDURE — 80053 COMPREHEN METABOLIC PANEL: CPT

## 2022-05-31 PROCEDURE — 87077 CULTURE AEROBIC IDENTIFY: CPT

## 2022-05-31 PROCEDURE — 82746 ASSAY OF FOLIC ACID SERUM: CPT

## 2022-05-31 PROCEDURE — 83036 HEMOGLOBIN GLYCOSYLATED A1C: CPT

## 2022-05-31 PROCEDURE — 87186 SC STD MICRODIL/AGAR DIL: CPT

## 2022-06-01 LAB — RHEUMATOID FACTOR: <10 IU/ML (ref 0–14)

## 2022-06-02 LAB
ANTI-NUCLEAR ANTIBODY (ANA): NORMAL
CULTURE: ABNORMAL
Lab: ABNORMAL
SPECIMEN: ABNORMAL

## 2022-06-09 ENCOUNTER — HOSPITAL ENCOUNTER (EMERGENCY)
Age: 47
Discharge: HOME | End: 2022-06-09
Payer: COMMERCIAL

## 2022-06-09 VITALS
RESPIRATION RATE: 17 BRPM | SYSTOLIC BLOOD PRESSURE: 133 MMHG | DIASTOLIC BLOOD PRESSURE: 98 MMHG | HEART RATE: 86 BPM | OXYGEN SATURATION: 98 % | TEMPERATURE: 99.2 F

## 2022-06-09 DIAGNOSIS — K04.7 DENTAL ABSCESS: Primary | ICD-10-CM

## 2022-06-09 DIAGNOSIS — K02.9 DENTAL DECAY: ICD-10-CM

## 2022-06-09 PROCEDURE — 6370000000 HC RX 637 (ALT 250 FOR IP): Performed by: PHYSICIAN ASSISTANT

## 2022-06-09 PROCEDURE — 99283 EMERGENCY DEPT VISIT LOW MDM: CPT

## 2022-06-09 RX ORDER — CLINDAMYCIN HYDROCHLORIDE 150 MG/1
450 CAPSULE ORAL ONCE
Status: COMPLETED | OUTPATIENT
Start: 2022-06-09 | End: 2022-06-09

## 2022-06-09 RX ORDER — CLINDAMYCIN HYDROCHLORIDE 150 MG/1
450 CAPSULE ORAL 3 TIMES DAILY
Qty: 63 CAPSULE | Refills: 0 | Status: SHIPPED | OUTPATIENT
Start: 2022-06-09 | End: 2022-06-16

## 2022-06-09 RX ADMIN — CLINDAMYCIN HYDROCHLORIDE 450 MG: 150 CAPSULE ORAL at 21:17

## 2022-06-10 NOTE — ED PROVIDER NOTES
EMERGENCY DEPARTMENT ENCOUNTER      PCP: Enrique Aguillon MD    [address] [facility]    Chief Complaint   Patient presents with    Ear Fullness     R ear; feels like it's draining into her throat     This patient was not evaluated by the attending physician. I have independently evaluated this patient. HPI    Malcom Otto is a 52 y.o. female who presents with right upper dental pain and right ear pain. Onset 3 days ago. Patient has had associated fever. Patient states he has history of poor dentition and decay. Patient denies chest pain, shortness of breath, abdominal pain, vomiting or diarrhea. No known alleviating factors. Patient denies difficulty swallowing. REVIEW OF SYSTEMS    General: see HPI  ENT:  No tongue or lip swelling, No difficulty swallowing  Cardiac: Denies Chest Pain  Respiratory: Denies difficulty breathing, wheezes.   GI: Denies vomiting or diarrhea    All other review of systems are negative  See HPI and nursing notes for additional information     PAST MEDICAL & SURGICAL HISTORY    Past Medical History:   Diagnosis Date    Asthma     [facility] ([facility]     Depression     Left tubo-ovarian abscess 1/24/2020    Migraine     Pelvic peritoneal adhesions, female (postoperative) (postinfection) 1/24/2020    Sch[facility]ia ([facility]     Scoliosis      Past Surgical History:   Procedure Laterality Date    ABDOMEN SURGERY      laproscope    ANUS SURGERY      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, had one constructed as an infant    COLON SURGERY  01/23/2020    SALPINGO-OOPHORECTOMY N/A 1/23/2020    LAPAROSCOPY EXPLORATORY -CONVERTED TO  OPEN LEFT SALPINGECTOMY AND OOPHORECTOMY AND DRAINAGE OF TUBE OVARIAN ABSCESS AND LYSIS OF ADHESIONS performed by Cheli Charlton MD at [facility]    Current Outpatient Rx   Medication Sig Dispense Refill    clindamycin (CLEOCIN) 150 MG capsule Take 3 capsules by mouth 3 times daily for 7 days 63 capsule 0    Magic Mouthwash (MIRACLE MOUTHWASH) Swish and spit 5 mLs 4 times daily as needed for Dental Pain Equal parts mixture benadryl, maalox, and lidocaine.  240 mL 0    acetaminophen-codeine (TYLENOL #3) 300-30 MG per tablet TAKE 1 TABLET BY MOUTH EVERY 6 HOURS AS NEEDED FOR DENTAL PAIN      NARCAN 4 MG/0.1ML LIQD nasal spray DISPENSED PER STANDING ORDER USE AS DIRECTED PATIENT IS TRAINED OPIOID OVERDOSE RESPONDER      ondansetron (ZOFRAN-ODT) 4 MG disintegrating tablet dissolve 1 tablet ON TONGUE once daily if needed for nausea OR vomiting      predniSONE (DELTASONE) 20 MG tablet take 2 tablets by mouth once daily for 5 days      dicyclomine (BENTYL) 10 MG capsule Take 1 capsule by mouth 3 times daily (before meals) 270 capsule 3    albuterol sulfate  (90 Base) MCG/ACT inhaler inhale 1 puff by mouth and INTO THE LUNGS every 4 hours      cetirizine (ZYRTEC) 10 MG tablet take 1 tablet by mouth once daily      Lactobacillus (ACIDOPHILUS PROBIOTIC) 0.5 MG TABS take 1 tablet by mouth once daily      levothyroxine (SYNTHROID) 25 MCG tablet take 1 tablet by mouth every morning ON AN EMPTY STOMACH once daily (Patient not taking: Reported on 12/20/2021)      pantoprazole (PROTONIX) 20 MG tablet Take 1 tablet by mouth daily 30 tablet 0    oxyCODONE-acetaminophen (PERCOCET) 5-325 MG per tablet TK 1 T PO QD      sucralfate (CARAFATE) 1 GM tablet Take 1 tablet by mouth 4 times daily for 14 days 56 tablet 0    guaiFENesin (MUCINEX) 600 MG extended release tablet Take 2 tablets by mouth 2 times daily 14 tablet 0    Menthol (ICY HOT) 5 % PTCH Apply 1 patch topically daily as needed      hydrocortisone 2.5 % cream Apply topically daily as needed      Multiple Vitamin (DAILY-MIGNON) TABS Take 1 tablet by mouth daily      rOPINIRole (REQUIP) 1 MG tablet Take 1 mg by mouth nightly (Patient not taking: Reported on 12/20/2021)      fluticasone (FLONASE) 50 MCG/ACT nasal spray 1 spray by Nasal route daily      LORazepam (ATIVAN) 0.5 MG tablet Take 0.5 mg by mouth every 6 hours as needed for Anxiety.  QUEtiapine (SEROQUEL) 100 MG tablet Take 100 mg by mouth nightly       polycarbophil (FIBERCON) 625 MG tablet Take 625 mg by mouth daily      butalbital-aspirin-caffeine (FIORINAL) -40 MG capsule Take by mouth every 4 hours as needed for Headaches.  prochlorperazine (COMPAZINE) 5 MG tablet Take 5 mg by mouth every 6 hours as needed for Nausea         ALLERGIES    Allergies   Allergen Reactions    Morphine And Related Hives    Ibuprofen Hives    Motrin [Ibuprofen Micronized] Hives       SOCIAL & FAMILY HISTORY    Social History     Socioeconomic History    Marital status:      Spouse name: None    Number of children: None    Years of education: None    Highest education level: None   Occupational History    None   Tobacco Use    Smoking status: Current Every Day Smoker     Packs/day: 0.50     Years: 20.00     Pack years: 10.00     Types: Cigarettes    Smokeless tobacco: Never Used   Vaping Use    Vaping Use: Never used   Substance and Sexual Activity    Alcohol use: Yes     Comment: occassionally    Drug use: Not Currently     Types: Marijuana Julio Cesar Mijares)    Sexual activity: Never   Other Topics Concern    None   Social History Narrative    None     Social Determinants of Health     Financial Resource Strain:     Difficulty of Paying Living Expenses: Not on file   Food Insecurity:     Worried About Running Out of Food in the Last Year: Not on file    Beatriz of Food in the Last Year: Not on file   Transportation Needs:     Lack of Transportation (Medical): Not on file    Lack of Transportation (Non-Medical):  Not on file   Physical Activity:     Days of Exercise per Week: Not on file    Minutes of Exercise per Session: Not on file   Stress:     Feeling of Stress : Not on file   Social Connections:     Frequency of Communication with Friends and Family: Not on file    Frequency of Social Gatherings with Friends and Family: Not on file    Attends Episcopalian Services: Not on file    Active Member of Clubs or Organizations: Not on file    Attends Club or Organization Meetings: Not on file    Marital Status: Not on file   Intimate Partner Violence:     Fear of Current or Ex-Partner: Not on file    Emotionally Abused: Not on file    Physically Abused: Not on file    Sexually Abused: Not on file   Housing Stability:     Unable to Pay for Housing in the Last Year: Not on file    Number of [place] in the Last Year: Not on file    Unstable Housing in the Last Year: Not on file     History reviewed. No pertinent family history. PHYSICAL EXAM    VITAL SIGNS: BP (!) 133/98   Pulse 86   Temp 99.2 °F (37.3 °C) (Oral)   Resp 17   SpO2 98%    Constitutional:  Well developed, well nourished, no acute distress   HENT:  Atraumatic, moist mucus membranes. EOMI. No proptosis. Ear canals and TMs clear bilateral.  No mastoid tenderness or erythema  There is no maxillary sinus tenderness to percussion or redness/warmth. Neck/Lymphatics: supple, no JVD, mild cervical lymphadenopathy. Musculoskeletal:  No edema, no deformities  Integument:  Skin warm and dry, no petechiae   Neurologic:  Alert & oriented, no slurred speech  Psych: Pleasant affect, no hallucinations  Dental:   Right Upper molars with evidence of caries, decay. Gingival buccal interface with erythema, palpable fluctuance. No sublingual swelling or masses   No submandibular swelling. No facial swelling or redness    Oropharynx:    Posterior pharynx without swelling, tonsillar hypertrophy. No sublingual swelling.      ______________________________________________________________________       Procedure Note - Sharron Mensah PA-C, RAJIV      Incision and Drainage:  Procedure Note    Indication: Dental Abscess    Procedure:   Incision and Drainage:  - Procedure explained, including risks and benefits explained to the patient who expressed understanding. All questions were answered. Verbal consent obtained. - Affected area was not anesthetized    - Using a number 11 blade, a punch incision was made  - A moderate amount of pruruent material was expressed. - Patient was given water to swish/spit until clear of blood or pus.  - Pt tolerated the procedure well without complications. Victor M Coombs PA-C PA-C      Patient will be discharged with oral antibiotic therapy. Wound care instructions discussed with patient including close observation for worsening symptoms and immediate return to ED action plan. Patient also was instructed to avoid gritty foods and swish/spit after meals. Patient agrees to wound check in 48 hours, sooner if symptoms worsen or any new symptoms develop.  ______________________________________________________________________            ED COURSE & MEDICAL DECISION MAKING      Patient presents as above. No signs of Landen's angina. Patient does have dental abscess, see above procedure note for incision and drainage. Patient tolerates secretions. Right EAC and TM are clear. Patient will be started on clindamycin for dental abscess and provided Magic mouthwash. Patient states has been medication at home. Recommend follow-up with dentist as soon as possible for reevaluation. Clinical  IMPRESSION    Dental abscess  Dental decay      Return to emergency Department warning signs discussed in detail with patient who understands and agrees, including but not limited to difficulty swallowing, increased jaw swelling, fever, increased pain, or any new symptoms. Comment: Please note this report has been produced using speech recognition software and may contain errors related to that system including errors in grammar, punctuation, and spelling, as well as words and phrases that may be inappropriate.  If there are any questions or concerns please feel free to contact the dictating provider for clarification.         Stacy Wilde PA-C  06/09/22 [id number]

## 2022-06-10 NOTE — ED NOTES
[place] at bedside assessing pt at this time.     Aaron Damon, KARYNP     Jared Jackson  06/09/22 [id number]

## 2022-09-01 ENCOUNTER — HOSPITAL ENCOUNTER (OUTPATIENT)
Dept: GENERAL RADIOLOGY | Age: 47
Discharge: HOME | End: 2022-09-01
Payer: COMMERCIAL

## 2022-09-01 ENCOUNTER — HOSPITAL ENCOUNTER (OUTPATIENT)
Age: 47
Discharge: HOME | End: 2022-09-01
Payer: COMMERCIAL

## 2022-09-01 DIAGNOSIS — G89.4 CHRONIC PAIN SYNDROME: ICD-10-CM

## 2022-09-01 PROCEDURE — 73560 X-RAY EXAM OF KNEE 1 OR 2: CPT

## 2022-09-01 PROCEDURE — 73610 X-RAY EXAM OF ANKLE: CPT

## 2022-09-01 PROCEDURE — 73630 X-RAY EXAM OF FOOT: CPT

## 2022-09-01 PROCEDURE — 73030 X-RAY EXAM OF SHOULDER: CPT

## 2022-09-01 PROCEDURE — 73502 X-RAY EXAM HIP UNI 2-3 VIEWS: CPT

## 2022-09-06 ENCOUNTER — HOSPITAL ENCOUNTER (EMERGENCY)
Age: 47
Discharge: HOME | End: 2022-09-06
Payer: COMMERCIAL

## 2022-09-06 VITALS
DIASTOLIC BLOOD PRESSURE: 84 MMHG | TEMPERATURE: 97.9 F | BODY MASS INDEX: 16.71 KG/M2 | HEART RATE: 67 BPM | RESPIRATION RATE: 20 BRPM | WEIGHT: 104 LBS | OXYGEN SATURATION: 100 % | HEIGHT: 66 IN | SYSTOLIC BLOOD PRESSURE: 149 MMHG

## 2022-09-06 DIAGNOSIS — R03.0 ELEVATED BLOOD PRESSURE READING WITHOUT DIAGNOSIS OF HYPERTENSION: ICD-10-CM

## 2022-09-06 DIAGNOSIS — R51.9 ACUTE NONINTRACTABLE HEADACHE, UNSPECIFIED HEADACHE TYPE: Primary | ICD-10-CM

## 2022-09-06 LAB
SARS-COV-2, NAAT: NOT DETECTED
SOURCE: NORMAL

## 2022-09-06 PROCEDURE — 6370000000 HC RX 637 (ALT 250 FOR IP): Performed by: PHYSICIAN ASSISTANT

## 2022-09-06 PROCEDURE — 6360000002 HC RX W HCPCS: Performed by: PHYSICIAN ASSISTANT

## 2022-09-06 PROCEDURE — 96374 THER/PROPH/DIAG INJ IV PUSH: CPT

## 2022-09-06 PROCEDURE — 96375 TX/PRO/DX INJ NEW DRUG ADDON: CPT

## 2022-09-06 PROCEDURE — 99284 EMERGENCY DEPT VISIT MOD MDM: CPT

## 2022-09-06 PROCEDURE — 87635 SARS-COV-2 COVID-19 AMP PRB: CPT

## 2022-09-06 RX ORDER — BUTALBITAL, ASPIRIN, AND CAFFEINE 325; 50; 40 MG/1; MG/1; MG/1
1 CAPSULE ORAL EVERY 6 HOURS PRN
Qty: 5 CAPSULE | Refills: 0 | Status: SHIPPED | OUTPATIENT
Start: 2022-09-06 | End: 2022-09-07

## 2022-09-06 RX ORDER — DIPHENHYDRAMINE HYDROCHLORIDE 50 MG/ML
25 INJECTION INTRAMUSCULAR; INTRAVENOUS ONCE
Status: COMPLETED | OUTPATIENT
Start: 2022-09-06 | End: 2022-09-06

## 2022-09-06 RX ORDER — METOCLOPRAMIDE HYDROCHLORIDE 5 MG/ML
10 INJECTION INTRAMUSCULAR; INTRAVENOUS ONCE
Status: COMPLETED | OUTPATIENT
Start: 2022-09-06 | End: 2022-09-06

## 2022-09-06 RX ORDER — KETOROLAC TROMETHAMINE 30 MG/ML
15 INJECTION, SOLUTION INTRAMUSCULAR; INTRAVENOUS ONCE
Status: COMPLETED | OUTPATIENT
Start: 2022-09-06 | End: 2022-09-06

## 2022-09-06 RX ORDER — BUTALBITAL, ACETAMINOPHEN AND CAFFEINE 50; 325; 40 MG/1; MG/1; MG/1
1 TABLET ORAL ONCE
Status: COMPLETED | OUTPATIENT
Start: 2022-09-06 | End: 2022-09-06

## 2022-09-06 RX ADMIN — BUTALBITAL, ACETAMINOPHEN AND CAFFEINE 1 TABLET: 50; 325; 40 TABLET ORAL at 11:18

## 2022-09-06 RX ADMIN — DIPHENHYDRAMINE HYDROCHLORIDE 25 MG: 50 INJECTION, SOLUTION INTRAMUSCULAR; INTRAVENOUS at 11:18

## 2022-09-06 RX ADMIN — METOCLOPRAMIDE 10 MG: 5 INJECTION, SOLUTION INTRAMUSCULAR; INTRAVENOUS at 11:18

## 2022-09-06 RX ADMIN — KETOROLAC TROMETHAMINE 15 MG: 30 INJECTION, SOLUTION INTRAMUSCULAR; INTRAVENOUS at 11:19

## 2022-09-06 ASSESSMENT — PAIN SCALES - GENERAL: PAINLEVEL_OUTOF10: 10

## 2022-09-06 ASSESSMENT — ENCOUNTER SYMPTOMS
EYE PAIN: 0
RHINORRHEA: 0
NAUSEA: 1
COUGH: 1
DIARRHEA: 0
BACK PAIN: 0
VOMITING: 0
SHORTNESS OF BREATH: 0
ABDOMINAL PAIN: 0

## 2022-09-06 ASSESSMENT — PAIN - FUNCTIONAL ASSESSMENT: PAIN_FUNCTIONAL_ASSESSMENT: NONE - DENIES PAIN

## 2022-09-06 ASSESSMENT — PAIN DESCRIPTION - LOCATION: LOCATION: HEAD

## 2022-09-06 NOTE — DISCHARGE INSTRUCTIONS
Please contact your primary care provider's office today or tomorrow to discuss your visit to the emergency department, and schedule an appointment for reevaluation of your headache. Follow-up with your primary care provider for reevaluation of your blood pressure which was elevated today. Return with any confusion, vision changes, weakness, chest pain, shortness of breath, worsening symptoms or any new concerns.

## 2022-09-06 NOTE — ED PROVIDER NOTES
[address] ENCOUNTER        Pt Name: Morales Haynes  MRN: [id number]  [place] 1975  Date of evaluation: 9/6/2022  Provider: Vanessa Recio PA-C  PCP: Lalo Burciaga MD  Note Started: 10:29 AM EDT      BRYON. I have evaluated this patient. My supervising physician was available for consultation. Triage CHIEF COMPLAINT       Chief Complaint   Patient presents with    Headache     X2 days          HISTORY OF PRESENT ILLNESS   (Location/Symptom, Timing/Onset, Context/Setting, Quality, Duration, Modifying Factors, Severity)  Note limiting factors. Chief Complaint: headache    Morales Haynes is a 52 y.o. female who presents concern for headache. She compares it including similar headaches that she has in the past.  Describes it gradual in onset. She has been seen by her PCP and a neurologist in the past.  Typically has had successful treatment with Fioricet but has not had any today. Is accompanied with some mild nausea, and she states that she does feel chilled and has had a mild cough. But she denies any confusion, shortness of breath, chest pain, neck pain, fever, onset during exertion, abdominal pain    Nursing Notes were all reviewed and agreed with or any disagreements were addressed in the HPI. REVIEW OF SYSTEMS    (2-9 systems for level 4, 10 or more for level 5)     Review of Systems   Constitutional:  Positive for chills. Negative for fever. HENT:  Negative for congestion and rhinorrhea. Eyes:  Negative for pain and visual disturbance. Respiratory:  Positive for cough. Negative for shortness of breath. Cardiovascular:  Negative for chest pain and leg swelling. Gastrointestinal:  Positive for nausea. Negative for abdominal pain, diarrhea and vomiting. Genitourinary:  Negative for dysuria and hematuria. Musculoskeletal:  Negative for back pain and myalgias.    Skin:  Negative for rash and wound. Neurological:  Positive for headaches. Negative for dizziness, syncope, speech difficulty, weakness, light-headedness and numbness. Psychiatric/Behavioral:  Negative for confusion and suicidal ideas. 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 , Disp-240 mL, R-0Normal      acetaminophen-codeine (TYLENOL #3) 300-30 MG per tablet TAKE 1 TABLET BY MOUTH EVERY 6 HOURS AS NEEDED FOR DENTAL PAINHistorical Med      NARCAN 4 MG/0.1ML LIQD nasal spray DISPENSED PER STANDING ORDER USE AS DIRECTED PATIENT IS TRAINED OPIOID OVERDOSE RESPONDER, DAWHistorical Med      ondansetron (ZOFRAN-ODT) 4 MG disintegrating tablet dissolve 1 tablet ON TONGUE once daily if needed for nausea OR vomitingHistorical Med      predniSONE (DELTASONE) 20 MG tablet take 2 tablets by mouth once daily for 5 daysHistorical Med      dicyclomine (BENTYL) 10 MG capsule Take 1 capsule by mouth 3 times daily (before meals), Disp-270 capsule, R-3Normal      albuterol sulfate  (90 Base) MCG/ACT inhaler inhale 1 puff by mouth and INTO THE LUNGS every 4 hoursHistorical Med      cetirizine (ZYRTEC) 10 MG tablet take 1 tablet by mouth once dailyHistorical Med      Lactobacillus (ACIDOPHILUS PROBIOTIC) 0.5 MG TABS take 1 tablet by mouth once dailyHistorical Med      levothyroxine (SYNTHROID) 25 MCG tablet take 1 tablet by mouth every morning ON AN EMPTY STOMACH once dailyHistorical Med      pantoprazole (PROTONIX) 20 MG tablet Take 1 tablet by mouth daily, Disp-30 tablet, R-0Normal      oxyCODONE-acetaminophen (PERCOCET) 5-325 MG per tablet TK 1 T PO QDHistorical Med      guaiFENesin (MUCINEX) 600 MG extended release tablet Take 2 tablets by mouth 2 times daily, Disp-14 tablet, R-0Normal      Menthol 5 % PTCH Apply 1 patch topically daily as neededHistorical Med      hydrocortisone 2.5 % cream Apply topically daily as needed, Topical, DAILY PRN Starting Tue 1/14/2020, Historical Med      Multiple Vitamin (DAILY-MIGNON) TABS Take 1 tablet by mouth dailyHistorical Med      rOPINIRole (REQUIP) 1 MG tablet Take 1 mg by mouth nightlyHistorical Med      fluticasone (FLONASE) 50 MCG/ACT nasal spray 1 spray by Nasal route dailyHistorical Med      LORazepam (ATIVAN) 0.5 MG tablet Take 0.5 mg by mouth every 6 hours as needed for Anxiety. Historical Med      QUEtiapine (SEROQUEL) 100 MG tablet Take 100 mg by mouth nightly Historical Med      polycarbophil (FIBERCON) 625 MG tablet Take 625 mg by mouth dailyHistorical Med      prochlorperazine (COMPAZINE) 5 MG tablet Take 5 mg by mouth every 6 hours as needed for NauseaHistorical Med             ALLERGIES     Morphine and related, Ibuprofen, and Motrin [ibuprofen micronized]    FAMILYHISTORY     History reviewed. No pertinent family history.      SOCIAL HISTORY       Social History     Socioeconomic History    Marital status:      Spouse name: None    Number of children: None    Years of education: None Highest education level: None   Tobacco Use    Smoking status: Every Day     Packs/day: 0.50     Years: 20.00     Pack years: 10.00     Types: Cigarettes    Smokeless tobacco: Never   Vaping Use    Vaping Use: Never used   Substance and Sexual Activity    Alcohol use: Yes     Comment: occassionally    Drug use: Not Currently     Types: Marijuana Josue Sy)    Sexual activity: Never       SCREENINGS    [place] Coma Scale  Eye Opening: Spontaneous  Best Verbal Response: Oriented  Best Motor Response: Obeys commands  [place] Coma Scale Score: 15        PHYSICAL EXAM    (up to 7 for level 4, 8 or more for level 5)     ED Triage Vitals [09/06/22 0919]   BP Temp Temp src Heart Rate Resp SpO2 Height Weight   (!) 151/103 97.9 °F (36.6 °C) -- 86 16 99 % 5' 6\" (1.676 m) 104 lb (47.2 kg)       Physical Exam  Vitals and nursing note reviewed. Constitutional:       Appearance: Normal appearance. She is well-developed. She is not ill-appearing or diaphoretic. HENT:      Head: Normocephalic and atraumatic. Eyes:      General: No scleral icterus. Right eye: No discharge. Left eye: No discharge. Cardiovascular:      Rate and Rhythm: Normal rate and regular rhythm. Heart sounds: Normal heart sounds. No murmur heard. No friction rub. No gallop. Pulmonary:      Effort: Pulmonary effort is normal. No respiratory distress. Breath sounds: Normal breath sounds. No stridor. No wheezing or rales. Chest:      Chest wall: No tenderness. Abdominal:      General: Bowel sounds are normal. There is no distension. Palpations: Abdomen is soft. There is no mass. Tenderness: There is no abdominal tenderness. There is no guarding or rebound. Musculoskeletal:         General: No tenderness. Normal range of motion. Cervical back: Normal range of motion and neck supple. Skin:     General: Skin is warm and dry. Coloration: Skin is not pale.    Neurological:      Mental Status: She is alert and oriented to person, place, and time. GCS: GCS eye subscore is 4. GCS verbal subscore is 5. GCS motor subscore is 6. Cranial Nerves: Cranial nerves are intact. Motor: Motor function is intact. No weakness or atrophy. Coordination: Coordination normal. Finger-Nose-Finger Test normal.   Psychiatric:         Mood and Affect: Mood normal.         Behavior: Behavior normal.       EMERGENCY DEPARTMENT COURSE:   Vitals:    Vitals:    09/06/22 0919 09/06/22 1030 09/06/22 1125 09/06/22 1300   BP: (!) 151/103 (!) 164/98 (!) 162/111 (!) 149/84   Pulse: 86  67    Resp: 16  20    Temp: 97.9 °F (36.6 °C)      SpO2: 99%  100%    Weight: 104 lb (47.2 kg)      Height: 5' 6\" (1.676 m)                Pt is a 52 y.o. female who presents with above HPI. She does have a history of prior headaches, previous evaluations. She is not known to have any history of immunocompromise conditions. No altered mental status. No meningeal signs. No neck pain. No thunderclap. No vomiting. New onset during exertion. I do feel at this point we can try symptomatic measures, and no emergent blood work or imaging is warranted. Patient did receive IV cocktail, Fioricet. Repeat examination, symptoms improved. She was initially hypertensive, repeat examination blood pressure has improved as well. She mentions this is typically elevated in the emergency department setting, she has been evaluated PCPs office appears to be more normal.  At this point, with no focal neurodeficits, improved symptoms, I do feel she safe for outpatient management. Likely migraine, tension headache, URI  sinusitis, COVID-19.   I have low suspicion for subarachnoid hemorrhage, intracranial hemorrhage, cranial mass, CNS infection, meningitis, temporal arteritis, hypertensive crisis, pseudotumor cerebri        CONSULTS:   None        DIFFERENTIAL DIAGNOSIS/MDM:   Vitals:    Vitals:    09/06/22 0919 09/06/22 1030 09/06/22 1125 09/06/22 1300   BP: (!) [address] [address]  [phone]          DISCHARGE MEDICATIONS:  Discharge Medication List as of 9/6/2022  1:18 PM          DISCONTINUED MEDICATIONS:  Discharge Medication List as of 9/6/2022  1:18 PM        STOP taking these medications       CALCIUM 600+D HIGH POTENCY 600-400 MG-UNIT TABS per tab Comments:   Reason for Stopping:         acetaminophen (TYLENOL) 325 MG tablet Comments:   Reason for Stopping:                      (Please note that portions ofthis note were completed with a voice recognition program.  Efforts were made to edit the dictations but occasionally words are mis-transcribed.)    Constance Mojica PA-C (electronically signed)             Constance Mojica PA-C  09/06/22 0775

## 2022-09-27 ENCOUNTER — HOSPITAL ENCOUNTER (OUTPATIENT)
Age: 47
Discharge: HOME | End: 2022-09-27
Payer: COMMERCIAL

## 2022-09-27 LAB
ALT SERPL-CCNC: 14 U/L (ref 10–40)
AST SERPL-CCNC: 23 IU/L (ref 15–37)
BACTERIA: ABNORMAL /HPF
BILIRUBIN URINE: NEGATIVE MG/DL
BLOOD, URINE: NEGATIVE
BUN BLDV-MCNC: 13 MG/DL (ref 6–23)
C-REACTIVE PROTEIN, HIGH SENSITIVITY: 3 MG/L
CLARITY: ABNORMAL
COLOR: YELLOW
CREAT SERPL-MCNC: 0.8 MG/DL (ref 0.6–1.1)
ERYTHROCYTE SEDIMENTATION RATE: 11 MM/HR (ref 0–20)
GFR AFRICAN AMERICAN: >60 ML/MIN/1.73M2
GFR NON-AFRICAN AMERICAN: >60 ML/MIN/1.73M2
GLUCOSE, URINE: NEGATIVE MG/DL
HBV SURFACE AB TITR SER: 26.85 {TITER}
HEPATITIS B SURFACE ANTIGEN: NON REACTIVE
KETONES, URINE: NEGATIVE MG/DL
LEUKOCYTE ESTERASE, URINE: NEGATIVE
MUCUS: ABNORMAL HPF
NITRITE URINE, QUANTITATIVE: POSITIVE
PH, URINE: 6.5 (ref 5–8)
PROTEIN UA: NEGATIVE MG/DL
RBC URINE: 1 /HPF (ref 0–6)
SPECIFIC GRAVITY UA: 1.02 (ref 1–1.03)
SQUAMOUS EPITHELIAL: 3 /HPF
TOTAL CK: 84 IU/L (ref 26–140)
TRANSITIONAL EPITHELIAL: <1 /HPF
TSH HIGH SENSITIVITY: 0.65 UIU/ML (ref 0.27–4.2)
UROBILINOGEN, URINE: 0.2 MG/DL (ref 0.2–1)
WBC UA: 1 /HPF (ref 0–5)

## 2022-09-27 PROCEDURE — 36415 COLL VENOUS BLD VENIPUNCTURE: CPT

## 2022-09-27 PROCEDURE — 81001 URINALYSIS AUTO W/SCOPE: CPT

## 2022-09-27 PROCEDURE — 84450 TRANSFERASE (AST) (SGOT): CPT

## 2022-09-27 PROCEDURE — 82550 ASSAY OF CK (CPK): CPT

## 2022-09-27 PROCEDURE — 86038 ANTINUCLEAR ANTIBODIES: CPT

## 2022-09-27 PROCEDURE — 84443 ASSAY THYROID STIM HORMONE: CPT

## 2022-09-27 PROCEDURE — 86430 RHEUMATOID FACTOR TEST QUAL: CPT

## 2022-09-27 PROCEDURE — 86200 CCP ANTIBODY: CPT

## 2022-09-27 PROCEDURE — 86320 SERUM IMMUNOELECTROPHORESIS: CPT

## 2022-09-27 PROCEDURE — 86140 C-REACTIVE PROTEIN: CPT

## 2022-09-27 PROCEDURE — 87077 CULTURE AEROBIC IDENTIFY: CPT

## 2022-09-27 PROCEDURE — 85652 RBC SED RATE AUTOMATED: CPT

## 2022-09-27 PROCEDURE — 86235 NUCLEAR ANTIGEN ANTIBODY: CPT

## 2022-09-27 PROCEDURE — 86225 DNA ANTIBODY NATIVE: CPT

## 2022-09-27 PROCEDURE — 84460 ALANINE AMINO (ALT) (SGPT): CPT

## 2022-09-27 PROCEDURE — 86334 IMMUNOFIX E-PHORESIS SERUM: CPT

## 2022-09-27 PROCEDURE — 83516 IMMUNOASSAY NONANTIBODY: CPT

## 2022-09-27 PROCEDURE — 86704 HEP B CORE ANTIBODY TOTAL: CPT

## 2022-09-27 PROCEDURE — 84520 ASSAY OF UREA NITROGEN: CPT

## 2022-09-27 PROCEDURE — 87086 URINE CULTURE/COLONY COUNT: CPT

## 2022-09-27 PROCEDURE — 86160 COMPLEMENT ANTIGEN: CPT

## 2022-09-27 PROCEDURE — 87186 SC STD MICRODIL/AGAR DIL: CPT

## 2022-09-27 PROCEDURE — 82565 ASSAY OF CREATININE: CPT

## 2022-09-28 LAB
ANTI DNA DOUBLE STRANDED: 4 IU (ref 0–24)
HEPATITIS B CORE TOTAL ANTIBODY: NEGATIVE

## 2022-09-29 LAB
ANTI-NUCLEAR ANTIBODY (ANA): NORMAL
CULTURE: ABNORMAL
CULTURE: ABNORMAL
CYCLIC CITRULLINATED PEPTIDE ANTIBODY IGG: 3 UNITS (ref 0–19)
ENA TO SSA (RO) ANTIBODY: 2 AU/ML (ref 0–40)
ENA TO SSB (LA) ANTIBODY: 0 AU/ML (ref 0–40)
Lab: ABNORMAL
RHEUMATOID FACTOR: <10 IU/ML (ref 0–14)
SPECIMEN: ABNORMAL
SSA 60 RO IGG ANTIBODY: 4 AU/ML (ref 0–40)

## 2022-09-30 LAB
COMPLEMENT C3: 124 MG/DL (ref 90–180)
COMPLEMENT C4: 34 MG/DL (ref 10–40)
MYELOPEROXIDASE AB: 0 AU/ML (ref 0–19)
SERINE PR3 (ANCA): 3 AU/ML (ref 0–19)

## 2022-10-01 LAB
EER IMMUNOFIX ELECTROPHORESIS GEL: NORMAL
IMMUNOFIX ELECTROPHORESIS GEL: NORMAL

## 2022-12-20 ENCOUNTER — HOSPITAL ENCOUNTER (EMERGENCY)
Age: 47
Discharge: HOME | End: 2022-12-20
Attending: EMERGENCY MEDICINE
Payer: COMMERCIAL

## 2022-12-20 VITALS
RESPIRATION RATE: 16 BRPM | HEART RATE: 83 BPM | BODY MASS INDEX: 17.68 KG/M2 | DIASTOLIC BLOOD PRESSURE: 107 MMHG | HEIGHT: 66 IN | WEIGHT: 110 LBS | OXYGEN SATURATION: 100 % | SYSTOLIC BLOOD PRESSURE: 121 MMHG | TEMPERATURE: 97.7 F

## 2022-12-20 DIAGNOSIS — K04.7 DENTAL ABSCESS: Primary | ICD-10-CM

## 2022-12-20 PROCEDURE — 99283 EMERGENCY DEPT VISIT LOW MDM: CPT

## 2022-12-20 PROCEDURE — 6370000000 HC RX 637 (ALT 250 FOR IP): Performed by: EMERGENCY MEDICINE

## 2022-12-20 RX ORDER — PENICILLIN V POTASSIUM 500 MG/1
500 TABLET ORAL ONCE
Status: COMPLETED | OUTPATIENT
Start: 2022-12-20 | End: 2022-12-20

## 2022-12-20 RX ORDER — LIDOCAINE HYDROCHLORIDE 20 MG/ML
15 SOLUTION OROPHARYNGEAL ONCE
Status: COMPLETED | OUTPATIENT
Start: 2022-12-20 | End: 2022-12-20

## 2022-12-20 RX ORDER — PENICILLIN V POTASSIUM 500 MG/1
500 TABLET ORAL 4 TIMES DAILY
Qty: 40 TABLET | Refills: 0 | Status: SHIPPED | OUTPATIENT
Start: 2022-12-20 | End: 2022-12-30

## 2022-12-20 RX ADMIN — Medication 15 ML: at 10:58

## 2022-12-20 RX ADMIN — PENICILLIN V POTASSIUM 500 MG: 500 TABLET, FILM COATED ORAL at 10:58

## 2022-12-20 NOTE — ED PROVIDER NOTES
Emergency Department Encounter    Patient: Chuck Burgos  MRN: [id number]  : 1975  Date of Evaluation: 2022  ED Provider:  Flavio Fernandez MD    Triage Chief Complaint:   Dental Pain (Pt reports she has an abscess near a tooth causing her pain)    [place]:  Chuck Burgos is a 52 y.o. female that presents with complaint of tooth abscess. It is at the right upper molar. She has had similar in the past, received antibiotics and a mouthwash and it got better. She had not had time to go to the dentist.  The tooth was broken long ago. No facial swelling or difficulty opening her mouth or vomiting. 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 No pertinent family history.   Social History     Socioeconomic History    Marital status:      Spouse name: Not on file    Number of children: Not on file    Years of education: Not on file    Highest education level: Not on file   Occupational History    Not on file   Tobacco Use    Smoking status: Every Day     Packs/day: 0.50     Years: 20.00     Pack years: 10.00     Types: Cigarettes    Smokeless tobacco: Never   Vaping Use    Vaping Use: Never used Substance and Sexual Activity    Alcohol use: Yes     Comment: occassionally    Drug use: Not Currently     Types: Marijuana Osito Passy)    Sexual activity: Never   Other Topics Concern    Not on file   Social History Narrative    Not on file     Social Determinants of Health     Financial Resource Strain: Not on file   Food Insecurity: Not on file   Transportation Needs: Not on file   Physical Activity: Not on file   Stress: Not on file   Social Connections: Not on file   Intimate Partner Violence: Not on file   Housing Stability: Not on file     No current facility-administered medications for this encounter. Current Outpatient Medications   Medication Sig Dispense Refill    Magic Mouthwash (MIRACLE MOUTHWASH) Swish and spit 5 mLs 4 times daily as needed for Irritation 240 mL 0    penicillin v potassium (VEETID) 500 MG tablet Take 1 tablet by mouth 4 times daily for 10 days 40 tablet 0    benzocaine (ORAJEL) 10 % mucosal gel Take by mouth as needed. 9 g 0    butalbital-aspirin-caffeine (FIORINAL) -40 MG capsule Take 1 capsule by mouth every 6 hours as needed for Headaches for up to 1 day.  5 capsule 0    acetaminophen-codeine (TYLENOL #3) 300-30 MG per tablet TAKE 1 TABLET BY MOUTH EVERY 6 HOURS AS NEEDED FOR DENTAL PAIN      NARCAN 4 MG/0.1ML LIQD nasal spray DISPENSED PER STANDING ORDER USE AS DIRECTED PATIENT IS TRAINED OPIOID OVERDOSE RESPONDER      ondansetron (ZOFRAN-ODT) 4 MG disintegrating tablet dissolve 1 tablet ON TONGUE once daily if needed for nausea OR vomiting      predniSONE (DELTASONE) 20 MG tablet take 2 tablets by mouth once daily for 5 days      dicyclomine (BENTYL) 10 MG capsule Take 1 capsule by mouth 3 times daily (before meals) 270 capsule 3    albuterol sulfate  (90 Base) MCG/ACT inhaler inhale 1 puff by mouth and INTO THE LUNGS every 4 hours      cetirizine (ZYRTEC) 10 MG tablet take 1 tablet by mouth once daily      Lactobacillus (ACIDOPHILUS PROBIOTIC) 0.5 MG TABS take 1 tablet by mouth once daily      levothyroxine (SYNTHROID) 25 MCG tablet take 1 tablet by mouth every morning ON AN EMPTY STOMACH once daily (Patient not taking: No sig reported)      pantoprazole (PROTONIX) 20 MG tablet Take 1 tablet by mouth daily 30 tablet 0    oxyCODONE-acetaminophen (PERCOCET) 5-325 MG per tablet TK 1 T PO QD      guaiFENesin (MUCINEX) 600 MG extended release tablet Take 2 tablets by mouth 2 times daily 14 tablet 0    Menthol 5 % PTCH Apply 1 patch topically daily as needed      hydrocortisone 2.5 % cream Apply topically daily as needed      Multiple Vitamin (DAILY-MIGNON) TABS Take 1 tablet by mouth daily      rOPINIRole (REQUIP) 1 MG tablet Take 1 mg by mouth nightly (Patient not taking: No sig reported)      fluticasone (FLONASE) 50 MCG/ACT nasal spray 1 spray by Nasal route daily      LORazepam (ATIVAN) 0.5 MG tablet Take 0.5 mg by mouth every 6 hours as needed for Anxiety. QUEtiapine (SEROQUEL) 100 MG tablet Take 100 mg by mouth nightly       polycarbophil (FIBERCON) 625 MG tablet Take 625 mg by mouth daily      prochlorperazine (COMPAZINE) 5 MG tablet Take 5 mg by mouth every 6 hours as needed for Nausea       Allergies   Allergen Reactions    Morphine And Related Hives    Ibuprofen Hives    Motrin [Ibuprofen Micronized] Hives       Nursing Notes Reviewed    Physical Exam:  Triage VS:    ED Triage Vitals [12/20/22 1026]   Enc Vitals Group      BP (!) 128/91      Heart Rate 83      Resp 16      Temp 97.7 °F (36.5 °C)      Temp src       SpO2 100 %      Weight 110 lb (49.9 kg)      Height 5' 6\" (1.676 m)      Head Circumference       Peak Flow       Pain Score       Pain Loc       Pain Edu? Excl. in [address]? My pulse ox interpretation is - normal    General appearance:  No acute distress. Skin:  Warm. Dry. Eye:  Extraocular movements intact.      Ears, nose, mouth and throat:  Oral mucosa moist, cavity at the right upper molar, she does have an abscess associated, it is not draining slightly, tender but no facial swelling noted. No lymphadenopathy and no trismus  Neck:  Trachea midline. Extremity:  No swelling. Normal ROM     Heart:  Regular rate and rhythm  Perfusion:  intact  Respiratory:  Respirations nonlabored. Abdominal:  Non distended  Neurological:  Alert and oriented t          Psychiatric:  Appropriate    I have reviewed and interpreted all of the currently available lab results from this visit (if applicable):  No results found for this visit on 12/20/22. Radiographs (if obtained):  Radiologist's Report Reviewed:  No results found. EKG (if obtained): (All EKG's are interpreted by myself in the absence of a cardiologist)      MDM:  51-year-old female with history as above presents with a dental abscess, not in any distress. Plan will be for discharge home after penicillin. Orajel and penicillin written as well as Magic mouthwash for home. She is comfortable with plan, discharged in stable condition. No evidence of any deeper tissue infection, given return precautions    Clinical Impression:  1. Dental abscess      Disposition referral (if applicable):  Caitlin Roper MD  [address] [place] [address]  [phone]        Disposition medications (if applicable):  Discharge Medication List as of 12/20/2022 11:22 AM        START taking these medications    Details   penicillin v potassium (VEETID) 500 MG tablet Take 1 tablet by mouth 4 times daily for 10 days, Disp-40 tablet, R-0Normal      benzocaine (ORAJEL) 10 % mucosal gel Take by mouth as needed. , Disp-9 g, R-0, Normal           ED Provider Disposition Time  DISPOSITION Decision To Discharge 12/20/2022 11:20:17 AM      Comment: Please note this report has been produced using speech recognition software and may contain errors related to that system including errors in grammar, punctuation, and spelling, as well as words and phrases that may be inappropriate.   Efforts were made to edit the dictations.        Jin Mcneal MD  12/20/22 [id number]

## 2022-12-29 ENCOUNTER — APPOINTMENT (OUTPATIENT)
Dept: GENERAL RADIOLOGY | Age: 47
End: 2022-12-29
Payer: COMMERCIAL

## 2022-12-29 ENCOUNTER — APPOINTMENT (OUTPATIENT)
Dept: CT IMAGING | Age: 47
End: 2022-12-29
Payer: COMMERCIAL

## 2022-12-29 ENCOUNTER — HOSPITAL ENCOUNTER (EMERGENCY)
Age: 47
Discharge: HOME | End: 2022-12-29
Attending: EMERGENCY MEDICINE
Payer: COMMERCIAL

## 2022-12-29 VITALS
OXYGEN SATURATION: 99 % | SYSTOLIC BLOOD PRESSURE: 131 MMHG | TEMPERATURE: 97.5 F | RESPIRATION RATE: 16 BRPM | WEIGHT: 110 LBS | HEART RATE: 72 BPM | HEIGHT: 66 IN | DIASTOLIC BLOOD PRESSURE: 87 MMHG | BODY MASS INDEX: 17.68 KG/M2

## 2022-12-29 DIAGNOSIS — S09.90XA INJURY OF HEAD, INITIAL ENCOUNTER: ICD-10-CM

## 2022-12-29 DIAGNOSIS — M25.559 HIP PAIN: ICD-10-CM

## 2022-12-29 DIAGNOSIS — S09.90XA CLOSED HEAD INJURY, INITIAL ENCOUNTER: ICD-10-CM
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
## 2022-12-29 LAB — GLUCOSE BLD-MCNC: 108 MG/DL (ref 70–99)

## 2022-12-29 PROCEDURE — 72128 CT CHEST SPINE W/O DYE: CPT

## 2022-12-29 PROCEDURE — 70450 CT HEAD/BRAIN W/O DYE: CPT

## 2022-12-29 PROCEDURE — 99284 EMERGENCY DEPT VISIT MOD MDM: CPT

## 2022-12-29 PROCEDURE — 82962 GLUCOSE BLOOD TEST: CPT

## 2022-12-29 PROCEDURE — 6370000000 HC RX 637 (ALT 250 FOR IP): Performed by: EMERGENCY MEDICINE

## 2022-12-29 PROCEDURE — 71045 X-RAY EXAM CHEST 1 VIEW: CPT

## 2022-12-29 PROCEDURE — 73552 X-RAY EXAM OF FEMUR 2/>: CPT

## 2022-12-29 PROCEDURE — 72125 CT NECK SPINE W/O DYE: CPT

## 2022-12-29 PROCEDURE — 72170 X-RAY EXAM OF PELVIS: CPT

## 2022-12-29 RX ORDER — ACETAMINOPHEN 500 MG
1000 TABLET ORAL ONCE
Status: COMPLETED | OUTPATIENT
Start: 2022-12-29 | End: 2022-12-29

## 2022-12-29 RX ORDER — LIDOCAINE 4 G/G
1 PATCH TOPICAL DAILY
Qty: 30 PATCH | Refills: 0 | Status: SHIPPED | OUTPATIENT
Start: 2022-12-29 | End: 2023-01-28

## 2022-12-29 RX ORDER — CYCLOBENZAPRINE HCL 10 MG
10 TABLET ORAL ONCE
Status: COMPLETED | OUTPATIENT
Start: 2022-12-29 | End: 2022-12-29

## 2022-12-29 RX ORDER — ACETAMINOPHEN 325 MG/1
650 TABLET ORAL EVERY 6 HOURS PRN
Qty: 120 TABLET | Refills: 3 | Status: SHIPPED | OUTPATIENT
Start: 2022-12-29

## 2022-12-29 RX ORDER — CYCLOBENZAPRINE HCL 10 MG
10 TABLET ORAL NIGHTLY PRN
Qty: 10 TABLET | Refills: 0 | Status: SHIPPED | OUTPATIENT
Start: 2022-12-29 | End: 2023-01-08

## 2022-12-29 RX ORDER — LIDOCAINE 4 G/G
1 PATCH TOPICAL DAILY
Status: DISCONTINUED | OUTPATIENT
Start: 2022-12-29 | End: 2022-12-29 | Stop reason: HOSPADM

## 2022-12-29 RX ADMIN — CYCLOBENZAPRINE 10 MG: 10 TABLET, FILM COATED ORAL at 09:51

## 2022-12-29 RX ADMIN — ACETAMINOPHEN 1000 MG: 500 TABLET ORAL at 09:50

## 2022-12-29 ASSESSMENT — PAIN DESCRIPTION - LOCATION
LOCATION: HIP;HEAD;NECK
LOCATION: HEAD

## 2022-12-29 ASSESSMENT — ENCOUNTER SYMPTOMS
NAUSEA: 1
ALLERGIC/IMMUNOLOGIC NEGATIVE: 1
EYES NEGATIVE: 1
RESPIRATORY NEGATIVE: 1

## 2022-12-29 ASSESSMENT — PAIN SCALES - GENERAL
PAINLEVEL_OUTOF10: 9
PAINLEVEL_OUTOF10: 9

## 2022-12-29 ASSESSMENT — PAIN DESCRIPTION - DESCRIPTORS: DESCRIPTORS: DULL;THROBBING;ACHING

## 2022-12-29 NOTE — ED NOTES
Pt to waiting room via wheelchair to wait on radiology testing and results. Warm blankets provided.  No further needs voiced at this time     Abril Trujillo RN  12/29/22 [id number]

## 2022-12-29 NOTE — Clinical Note
Jose Barrett was seen and treated in our emergency department on 12/29/2022. She may return to work on 01/02/2023. If you have any questions or concerns, please don't hesitate to call.       Lisa Marc, DO

## 2022-12-29 NOTE — Clinical Note
Tarah Woods was seen and treated in our emergency department on 12/29/2022. She may return to work on 01/02/2023. If you have any questions or concerns, please don't hesitate to call.       Ayden Parker, DO

## 2022-12-29 NOTE — ED PROVIDER NOTES
[facility]      TRIAGE CHIEF COMPLAINT:   Fall (Fall on Monday c/o right hip and head pain)      [place]:  Lindsay Nair is a 52 y.o. female that presents with complaint of fall, right hip pain, head injury. Patient states she fell on Monday while doing therapy. She states she hit her head on the ground and also hit her back, and right hip. She has been ambulatory but states it hurts she states physical therapy sent her here for evaluation. Denies any blood thinners did not pass out, no fevers nausea vomiting chest pain shortness of breath no weakness observed tingling just right hip pain, upper back pain neck pain and headache. Has had some dizziness and concussive symptoms. Jimmie Finney REVIEW OF SYSTEMS:  At least 10 systems reviewed and otherwise acutely negative except as in the [address] Review of Systems   Constitutional: Negative. HENT: Negative. Eyes: Negative. Respiratory: Negative. Cardiovascular: Negative. Gastrointestinal:  Positive for nausea. Endocrine: Negative. Genitourinary: Negative. Musculoskeletal:  Positive for arthralgias and myalgias. Skin: Negative. Allergic/Immunologic: Negative. Neurological:  Positive for dizziness and headaches. Hematological: Negative. Psychiatric/Behavioral: Negative. All other systems reviewed and are negative.     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 No pertinent family history. Social History     Socioeconomic History    Marital status:      Spouse name: Not on file    Number of children: Not on file    Years of education: Not on file    Highest education level: Not on file   Occupational History    Not on file   Tobacco Use    Smoking status: Every Day     Packs/day: 0.50     Years: 20.00     Pack years: 10.00     Types: Cigarettes    Smokeless tobacco: Never   Vaping Use    Vaping Use: Never used   Substance and Sexual Activity    Alcohol use: Yes     Comment: occassionally    Drug use: Not Currently     Types: Marijuana Clayton Shoemaker)    Sexual activity: Never   Other Topics Concern    Not on file   Social History Narrative    Not on file     Social Determinants of Health     Financial Resource Strain: Not on file   Food Insecurity: Not on file   Transportation Needs: Not on file   Physical Activity: Not on file   Stress: Not on file   Social Connections: Not on file   Intimate Partner Violence: Not on file   Housing Stability: Not on file     No current facility-administered medications for this encounter. Current Outpatient Medications   Medication Sig Dispense Refill    acetaminophen (TYLENOL) 325 MG tablet Take 2 tablets by mouth every 6 hours as needed for Pain 120 tablet 3    lidocaine 4 % external patch Place 1 patch onto the skin daily 30 patch 0    cyclobenzaprine (FLEXERIL) 10 MG tablet Take 1 tablet by mouth nightly as needed for Muscle spasms 10 tablet 0    Magic Mouthwash (MIRACLE MOUTHWASH) Swish and spit 5 mLs 4 times daily as needed for Irritation 240 mL 0    penicillin v potassium (VEETID) 500 MG tablet Take 1 tablet by mouth 4 times daily for 10 days 40 tablet 0    benzocaine (ORAJEL) 10 % mucosal gel Take by mouth as needed.  9 g 0    butalbital-aspirin-caffeine (FIORINAL) -40 MG capsule Take 1 capsule by mouth every 6 hours as needed for Headaches for up to 1 day. 5 capsule 0    acetaminophen-codeine (TYLENOL #3) 300-30 MG per tablet TAKE 1 TABLET BY MOUTH EVERY 6 HOURS AS NEEDED FOR DENTAL PAIN      NARCAN 4 MG/0.1ML LIQD nasal spray DISPENSED PER STANDING ORDER USE AS DIRECTED PATIENT IS TRAINED OPIOID OVERDOSE RESPONDER      ondansetron (ZOFRAN-ODT) 4 MG disintegrating tablet dissolve 1 tablet ON TONGUE once daily if needed for nausea OR vomiting      predniSONE (DELTASONE) 20 MG tablet take 2 tablets by mouth once daily for 5 days      dicyclomine (BENTYL) 10 MG capsule Take 1 capsule by mouth 3 times daily (before meals) 270 capsule 3    albuterol sulfate  (90 Base) MCG/ACT inhaler inhale 1 puff by mouth and INTO THE LUNGS every 4 hours      cetirizine (ZYRTEC) 10 MG tablet take 1 tablet by mouth once daily      Lactobacillus (ACIDOPHILUS PROBIOTIC) 0.5 MG TABS take 1 tablet by mouth once daily      levothyroxine (SYNTHROID) 25 MCG tablet take 1 tablet by mouth every morning ON AN EMPTY STOMACH once daily (Patient not taking: No sig reported)      pantoprazole (PROTONIX) 20 MG tablet Take 1 tablet by mouth daily 30 tablet 0    oxyCODONE-acetaminophen (PERCOCET) 5-325 MG per tablet TK 1 T PO QD      guaiFENesin (MUCINEX) 600 MG extended release tablet Take 2 tablets by mouth 2 times daily 14 tablet 0    Menthol 5 % PTCH Apply 1 patch topically daily as needed      hydrocortisone 2.5 % cream Apply topically daily as needed      Multiple Vitamin (DAILY-MIGNON) TABS Take 1 tablet by mouth daily      rOPINIRole (REQUIP) 1 MG tablet Take 1 mg by mouth nightly (Patient not taking: No sig reported)      fluticasone (FLONASE) 50 MCG/ACT nasal spray 1 spray by Nasal route daily      LORazepam (ATIVAN) 0.5 MG tablet Take 0.5 mg by mouth every 6 hours as needed for Anxiety.       QUEtiapine (SEROQUEL) 100 MG tablet Take 100 mg by mouth nightly       polycarbophil (FIBERCON) 625 MG tablet Take 625 mg by mouth daily prochlorperazine (COMPAZINE) 5 MG tablet Take 5 mg by mouth every 6 hours as needed for Nausea        Allergies   Allergen Reactions    Morphine And Related Hives    Ibuprofen Hives    Motrin [Ibuprofen Micronized] Hives     No current facility-administered medications for this encounter. Current Outpatient Medications   Medication Sig Dispense Refill    acetaminophen (TYLENOL) 325 MG tablet Take 2 tablets by mouth every 6 hours as needed for Pain 120 tablet 3    lidocaine 4 % external patch Place 1 patch onto the skin daily 30 patch 0    cyclobenzaprine (FLEXERIL) 10 MG tablet Take 1 tablet by mouth nightly as needed for Muscle spasms 10 tablet 0    Magic Mouthwash (MIRACLE MOUTHWASH) Swish and spit 5 mLs 4 times daily as needed for Irritation 240 mL 0    penicillin v potassium (VEETID) 500 MG tablet Take 1 tablet by mouth 4 times daily for 10 days 40 tablet 0    benzocaine (ORAJEL) 10 % mucosal gel Take by mouth as needed. 9 g 0    butalbital-aspirin-caffeine (FIORINAL) -40 MG capsule Take 1 capsule by mouth every 6 hours as needed for Headaches for up to 1 day.  5 capsule 0    acetaminophen-codeine (TYLENOL #3) 300-30 MG per tablet TAKE 1 TABLET BY MOUTH EVERY 6 HOURS AS NEEDED FOR DENTAL PAIN      NARCAN 4 MG/0.1ML LIQD nasal spray DISPENSED PER STANDING ORDER USE AS DIRECTED PATIENT IS TRAINED OPIOID OVERDOSE RESPONDER      ondansetron (ZOFRAN-ODT) 4 MG disintegrating tablet dissolve 1 tablet ON TONGUE once daily if needed for nausea OR vomiting      predniSONE (DELTASONE) 20 MG tablet take 2 tablets by mouth once daily for 5 days      dicyclomine (BENTYL) 10 MG capsule Take 1 capsule by mouth 3 times daily (before meals) 270 capsule 3    albuterol sulfate  (90 Base) MCG/ACT inhaler inhale 1 puff by mouth and INTO THE LUNGS every 4 hours      cetirizine (ZYRTEC) 10 MG tablet take 1 tablet by mouth once daily      Lactobacillus (ACIDOPHILUS PROBIOTIC) 0.5 MG TABS take 1 tablet by mouth once daily levothyroxine (SYNTHROID) 25 MCG tablet take 1 tablet by mouth every morning ON AN EMPTY STOMACH once daily (Patient not taking: No sig reported)      pantoprazole (PROTONIX) 20 MG tablet Take 1 tablet by mouth daily 30 tablet 0    oxyCODONE-acetaminophen (PERCOCET) 5-325 MG per tablet TK 1 T PO QD      guaiFENesin (MUCINEX) 600 MG extended release tablet Take 2 tablets by mouth 2 times daily 14 tablet 0    Menthol 5 % PTCH Apply 1 patch topically daily as needed      hydrocortisone 2.5 % cream Apply topically daily as needed      Multiple Vitamin (DAILY-MIGNON) TABS Take 1 tablet by mouth daily      rOPINIRole (REQUIP) 1 MG tablet Take 1 mg by mouth nightly (Patient not taking: No sig reported)      fluticasone (FLONASE) 50 MCG/ACT nasal spray 1 spray by Nasal route daily      LORazepam (ATIVAN) 0.5 MG tablet Take 0.5 mg by mouth every 6 hours as needed for Anxiety. QUEtiapine (SEROQUEL) 100 MG tablet Take 100 mg by mouth nightly       polycarbophil (FIBERCON) 625 MG tablet Take 625 mg by mouth daily      prochlorperazine (COMPAZINE) 5 MG tablet Take 5 mg by mouth every 6 hours as needed for Nausea         Nursing Notes Reviewed    VITAL SIGNS:  ED Triage Vitals [12/29/22 0855]   Enc Vitals Group      BP (!) 141/100      Heart Rate 88      Resp 16      Temp 97.5 °F (36.4 °C)      Temp Source Oral      SpO2 98 %      Weight 110 lb (49.9 kg)      Height 5' 6\" (1.676 m)      Head Circumference       Peak Flow       Pain Score       Pain Loc       Pain Edu? Excl. in [address]? PHYSICAL EXAM:  Physical Exam  Vitals and nursing note reviewed. Constitutional:       General: She is not in acute distress. Appearance: Normal appearance. She is well-developed and well-groomed. She is not ill-appearing, toxic-appearing or diaphoretic. HENT:      Head: Normocephalic and atraumatic. Right Ear: External ear normal.      Left Ear: External ear normal.      Nose: No congestion or rhinorrhea.    Eyes: General: No scleral icterus. Right eye: No discharge. Left eye: No discharge. Extraocular Movements: Extraocular movements intact. Conjunctiva/sclera: Conjunctivae normal.   Cardiovascular:      Rate and Rhythm: Normal rate and regular rhythm. Pulses: Normal pulses. Heart sounds: Normal heart sounds. No murmur heard. No gallop. Pulmonary:      Effort: Pulmonary effort is normal. No respiratory distress. Breath sounds: Normal breath sounds. No stridor. No wheezing, rhonchi or rales. Abdominal:      General: Bowel sounds are normal. There is no distension. Palpations: Abdomen is soft. There is no mass. Tenderness: There is no abdominal tenderness. There is no guarding or rebound. Negative signs include Dillard's sign, Rovsing's sign and McBurney's sign. Hernia: No hernia is present. Musculoskeletal:         General: Tenderness present. No swelling, deformity or signs of injury. Normal range of motion. Right shoulder: Normal.      Left shoulder: Normal.      Right upper arm: Normal.      Left upper arm: Normal.      Right elbow: Normal.      Left elbow: Normal.      Right forearm: Normal.      Left forearm: Normal.      Cervical back: Normal range of motion. Tenderness present. No rigidity. Thoracic back: Tenderness present. Lumbar back: Normal.      Right hip: Tenderness present. Left hip: Normal.      Right upper leg: Tenderness present. Left upper leg: Normal.      Right knee: Normal.      Left knee: Normal.      Right lower leg: Normal. No edema. Left lower leg: Normal. No edema. Skin:     General: Skin is warm. Coloration: Skin is not jaundiced or pale. Findings: No bruising, erythema, lesion or rash. Neurological:      General: No focal deficit present. Mental Status: She is alert and oriented to person, place, and time. GCS: GCS eye subscore is 4. GCS verbal subscore is 5. GCS motor subscore is 6. Cranial Nerves: Cranial nerves 2-12 are intact. No cranial nerve deficit, dysarthria or facial asymmetry. Sensory: Sensation is intact. No sensory deficit. Motor: Motor function is intact. No weakness, tremor, atrophy, abnormal muscle tone or seizure activity. Coordination: Coordination normal.   Psychiatric:         Mood and Affect: Mood normal.         Behavior: Behavior normal. Behavior is cooperative. Thought Content: Thought content normal.         Judgment: Judgment normal.         I have reviewed andinterpreted all of the currently available lab results from this visit (if applicable):    Results for orders placed or performed during the hospital encounter of 12/29/22   POCT Glucose   Result Value Ref Range    POC Glucose 108 (H) 70 - 99 MG/DL        Radiographs (if obtained):  [] The following radiograph was interpreted by myself in the absence of a radiologist:  [x] Radiologist's Report Reviewed:    CT brain, C-spine, x-ray right hip, pelvis, femur      EKG (if obtained): (All EKG's are interpreted by myself in the absence of a cardiologist)    MDM:    Patient here with complaint of fall, head injury, hip pain. Again patient states on Monday she was walking and she tripped and fell and hit her right hip, head, back. She did not pass out no blood thinners no fevers nausea vomiting chest pain shortness of breath, no weaknesses or tingling, correction she has had a headache and dizziness and myalgias and some nausea. Likely concussion. No blood thinners. She states she wishes therapy she was sent here for evaluation. On arrival she does appear well she has been taking Tylenol. She has no obvious signs of trauma she has pain to her right hip, mid back, neck, head. I did do imaging of head neck and right hip right femur. Also pelvis. So far work-up is negative including imaging. She was given medication likely concussion with muscle skeletal pain.   No signs of cord syndrome no signs of fracture dislocation etc.  I do not think she needs labs or additional imaging mechanical fall. Patient was stable discharge given medications return precautions follow-up information.   Okay plan    CLINICAL IMPRESSION:  Final diagnoses:   Fall, initial encounter   Injury of head, initial encounter   Hip pain   Closed head injury, initial encounter       (Please note that portions of this note may have been completed with a voice recognition program. Efforts were made to edit the dictations but occasionally words aremis-transcribed.)    DISPOSITION REFERRAL (if applicable):  Caitlin Roper MD  [address] [place] [address]  [phone]    Schedule an appointment as soon as possible for a visit in 1 day      [facility] Emergency Department  [address] [id number]  [phone]    If symptoms worsen    DISPOSITION MEDICATIONS (if applicable):  Discharge Medication List as of 12/29/2022 12:09 PM        START taking these medications    Details   lidocaine 4 % external patch Place 1 patch onto the skin daily, TransDERmal, DAILY Starting Thu 12/29/2022, Until Sat 1/28/2023, For 30 days, Disp-30 patch, R-0, Print      cyclobenzaprine (FLEXERIL) 10 MG tablet Take 1 tablet by mouth nightly as needed for Muscle spasms, Disp-10 tablet, R-0Print                Juarez Padron, DO Juarez Padron DO  12/29/22 2900

## 2023-02-14 ENCOUNTER — INITIAL CONSULT (OUTPATIENT)
Dept: CARDIOLOGY CLINIC | Age: 48
End: 2023-02-14
Payer: COMMERCIAL

## 2023-02-14 VITALS
HEART RATE: 76 BPM | WEIGHT: 106 LBS | SYSTOLIC BLOOD PRESSURE: 120 MMHG | BODY MASS INDEX: 17.04 KG/M2 | DIASTOLIC BLOOD PRESSURE: 90 MMHG | HEIGHT: 66 IN

## 2023-02-14 DIAGNOSIS — K65.1 INTRA-ABDOMINAL ABSCESS (HCC): Primary | ICD-10-CM

## 2023-02-14 DIAGNOSIS — R07.89 OTHER CHEST PAIN: ICD-10-CM

## 2023-02-14 PROCEDURE — 99244 OFF/OP CNSLTJ NEW/EST MOD 40: CPT | Performed by: INTERNAL MEDICINE

## 2023-02-14 PROCEDURE — 93000 ELECTROCARDIOGRAM COMPLETE: CPT | Performed by: INTERNAL MEDICINE

## 2023-02-14 PROCEDURE — G8484 FLU IMMUNIZE NO ADMIN: HCPCS | Performed by: INTERNAL MEDICINE

## 2023-02-14 PROCEDURE — G8427 DOCREV CUR MEDS BY ELIG CLIN: HCPCS | Performed by: INTERNAL MEDICINE

## 2023-02-14 PROCEDURE — G8419 CALC BMI OUT NRM PARAM NOF/U: HCPCS | Performed by: INTERNAL MEDICINE

## 2023-02-14 NOTE — PROGRESS NOTES
CARDIOLOGY CONSULT NOTE    Reason for consultation: Chest pain     Referring physician: Chacorta Huffman MD     Primary care physician: Holly Alfaro MD        Dear Vida Huffman for the consult. History of present illness:Radha is a 52 y. o.year old who  presents with  chest pain for last few days, happening daily, intermittent for 15 to 20 mins and aggravated with activity substernal also,reproducible with palpation and movement on the bed, she felt like pop on the left breast, radiated to shoulder, 6/10, tender to touch,associated with shortness of breath, + sweating, nausea, did not get NTG in ED. No fever, no chills, no nausea no vomiting. Blood pressure, cholesterol, blood glucose and weight are well controlled. Chief Complaint   Patient presents with    Chest Pain     Pt is woke up by chest pains   Pt is very fatigued  Family history of heart issues         Past medical history:    has a past medical history of Asthma, Bipolar disorder ([address] Depression, Left tubo-ovarian abscess, Migraine, Pelvic peritoneal adhesions, female (postoperative) (postinfection), Schizophrenia ([address] and Scoliosis. Past surgical history:   has a past surgical history that includes Anus surgery; Abdomen surgery; Salpingo-oophorectomy (N/A, 1/23/2020); and Colon surgery (01/23/2020). Social History:   reports that she has been smoking cigarettes. She has a 10.00 pack-year smoking history. She has never used smokeless tobacco. She reports current alcohol use. She reports that she does not currently use drugs after having used the following drugs: Marijuana Gail Eglin). Family history:   no family history of CAD, STROKE of DM    No current facility-administered medications for this visit.     Current Outpatient Medications   Medication Sig Dispense Refill    acetaminophen (TYLENOL) 325 MG tablet Take 2 tablets by mouth every 6 hours as needed for Pain 120 tablet 3    Magic Mouthwash (MIRACLE MOUTHWASH) Swish and spit 5 mLs 4 times daily as needed for Irritation 240 mL 0    benzocaine (ORAJEL) 10 % mucosal gel Take by mouth as needed. 9 g 0    butalbital-aspirin-caffeine (FIORINAL) -40 MG capsule Take 1 capsule by mouth every 6 hours as needed for Headaches for up to 1 day. 5 capsule 0    NARCAN 4 MG/0.1ML LIQD nasal spray DISPENSED PER STANDING ORDER USE AS DIRECTED PATIENT IS TRAINED OPIOID OVERDOSE RESPONDER      ondansetron (ZOFRAN-ODT) 4 MG disintegrating tablet dissolve 1 tablet ON TONGUE once daily if needed for nausea OR vomiting      dicyclomine (BENTYL) 10 MG capsule Take 1 capsule by mouth 3 times daily (before meals) 270 capsule 3    albuterol sulfate  (90 Base) MCG/ACT inhaler inhale 1 puff by mouth and INTO THE LUNGS every 4 hours      cetirizine (ZYRTEC) 10 MG tablet take 1 tablet by mouth once daily      Lactobacillus (ACIDOPHILUS PROBIOTIC) 0.5 MG TABS take 1 tablet by mouth once daily      pantoprazole (PROTONIX) 20 MG tablet Take 1 tablet by mouth daily 30 tablet 0    oxyCODONE-acetaminophen (PERCOCET) 5-325 MG per tablet TK 1 T PO QD      guaiFENesin (MUCINEX) 600 MG extended release tablet Take 2 tablets by mouth 2 times daily 14 tablet 0    Menthol 5 % PTCH Apply 1 patch topically daily as needed      hydrocortisone 2.5 % cream Apply topically daily as needed      Multiple Vitamin (DAILY-MIGNON) TABS Take 1 tablet by mouth daily      fluticasone (FLONASE) 50 MCG/ACT nasal spray 1 spray by Nasal route daily      LORazepam (ATIVAN) 0.5 MG tablet Take 0.5 mg by mouth every 6 hours as needed for Anxiety.       QUEtiapine (SEROQUEL) 100 MG tablet Take 100 mg by mouth nightly       polycarbophil (FIBERCON) 625 MG tablet Take 625 mg by mouth daily      prochlorperazine (COMPAZINE) 5 MG tablet Take 5 mg by mouth every 6 hours as needed for Nausea      acetaminophen-codeine (TYLENOL #3) 300-30 MG per tablet TAKE 1 TABLET BY MOUTH EVERY 6 HOURS AS NEEDED FOR DENTAL PAIN (Patient not taking: Reported on 2/14/2023)      predniSONE (DELTASONE) 20 MG tablet take 2 tablets by mouth once daily for 5 days (Patient not taking: Reported on 2/14/2023)      levothyroxine (SYNTHROID) 25 MCG tablet take 1 tablet by mouth every morning ON AN EMPTY STOMACH once daily (Patient not taking: No sig reported)      rOPINIRole (REQUIP) 1 MG tablet Take 1 mg by mouth nightly (Patient not taking: No sig reported)       No current facility-administered medications for this visit. Review of Systems:    Constitutional: No Fever or Weight Loss    Eyes: No Decreased Vision  ENT: No Headaches, Hearing Loss or Vertigo  Cardiovascular: + chest pain, dyspnea on exertion,+ palpitations or loss of consciousness  Respiratory: No cough or wheezing    Gastrointestinal: No abdominal pain, appetite loss, blood in stools, constipation, diarrhea or heartburn  Genitourinary: No dysuria, trouble voiding, or hematuria  Musculoskeletal: No gait disturbance, weakness or joint complaints  Integumentary: No rash or pruritis  Neurological: No TIA or stroke symptoms  Psychiatric: No anxiety or depression  Endocrine: No malaise, fatigue or temperature intolerance  Hematologic/Lymphatic: No bleeding problems, blood clots or swollen lymph nodes  Allergic/Immunologic: No nasal congestion or hives  All systems negative except as marked. Physical Examination:    Vitals:    02/14/23 1055   BP: (!) 120/90   Pulse: 76    Rr 14  afebrile  Wt Readings from Last 3 Encounters:   02/14/23 106 lb (48.1 kg)   12/29/22 110 lb (49.9 kg)   12/20/22 110 lb (49.9 kg)     Body mass index is 17.11 kg/m². General Appearance: No distress, conversant     Constitutional: Well developed, Well nourished, No acute distress, Non-toxic appearance.     HENT:  Normocephalic, Atraumatic, Bilateral external ears normal, Oropharynx moist, No oral exudates, Nose normal. Neck- Normal range of motion, No tenderness, Supple, No stridor,no apical-carotid delay, no carotid bruit  Eyes: PERRL, EOMI, Conjunctiva normal, No discharge. Respiratory:  Normal breath sounds, No respiratory distress, No wheezing, No chest tenderness. ,no use of accessory muscles, diaphragm movement is normal  Cardiovascular: (PMI) apex non displaced,no lifts no thrills, no s3,no s4, Normal heart rate, Normal rhythm, No murmurs, No rubs, No gallops. Carotid arteries pulse and amplitude are normal no bruit, no abdominal bruit noted ( normal abdominal aorta ausculation), femoral arteries pulse and amplitude are normal no bruit, pedal pulses are normal.  GI: Bowel sounds normal, Soft, No tenderness, No masses, No pulsatile masses, no hepatosplenomegally, no bruits  : External genitalia appear normal, No masses or lesions. No discharge. No CVA tenderness. Musculoskeletal: left foot callus,Intact distal pulses, No edema, No tenderness, No cyanosis, No clubbing. Good range of motion in all major joints. No tenderness to palpation or major deformities noted. Back- No tenderness. Integument: Warm, Dry, No erythema, No rash. Skin: no rash, no ulcers  Lymphatic: No lymphadenopathy noted. Neurologic: Alert & oriented x 3, Normal motor function, Normal sensory function, No focal deficits noted. Psychiatric: Affect normal, Judgment normal, Mood normal.   Lab Review        Recent Labs       09/28/16  0010    WBC  12.3*    HGB  14.4    HCT  43.8    PLT  316            Recent Labs       09/28/16  0010    NA  136    K  3.9    CL  95*    CO2  23    BUN  10    CREATININE  0.8           Recent Labs       09/28/16 0010    AST  12*    ALT  10    BILITOT  0.4    ALKPHOS  124       No results for input(s): TROPONINI in the last 72 hours.   No results found for: BNP  No results found for: INR, PROTIME        EKG:nsr     Chest Xray:nad     ECHO:pending  Labs, echo, meds reviewed  Assessment:      Recommendations:     Chest pain: out patient stress test and echo  Asthma: stable  Migraine: stable, continue tylenol and florinol  GERD: stable, continue protonix  Palpitations: 2 week holter monitor ordered  Schizophrenia: stable, seroquel  Anxiety: continue ativan  ? Left foot callus: recommend to see podiatorist  Health maintenance: exerise and diet  All labs, medications and tests reviewed, continue all other medications of all above medical condition listed as is.           Susana Cespedes MD,   Susana Cespedes MD, 2/14/2023 11:08 AM

## 2023-02-14 NOTE — PATIENT INSTRUCTIONS
**It is YOUR responsibilty to bring medication bottles and/or updated medication list to [address] [place] [address]. This will allow us to better serve you and all your healthcare needs**    [facility] Locations - No appointment necessary. Sites open Monday to Friday. Call your preferred location for test preparation, business   hours and other information you need. [employer]. [address] Guelrine Drew, [address] [facility]  Phone: [phone] [place]  [address] [facility]  [address] Jessika sanchez, [address] [facility]  Phone: [phone]       Please be informed that if you contact our office outside of normal business hours the physician on call cannot help with any scheduling or rescheduling issues, procedure instruction questions or any type of medication issue. We advise you for any urgent/emergency that you go to the nearest emergency room! PLEASE CALL OUR OFFICE DURING NORMAL BUSINESS HOURS    Monday - Friday   8 am to 5 pm    [facility] [address]: [phone]    [place]:  [phone]    Thank you for allowing us to care for you today! We want to ensure we can follow your treatment plan and we strive to give you the best outcomes and experience possible. If you ever have a life threatening emergency and call 911 - for an ambulance (EMS)   Our providers can only care for you at:   [facility] or [facility]. Even if you have someone take you or you drive yourself we can only care for you in a [facility] facility. Our providers are not setup at the other healthcare locations!

## 2023-02-23 ENCOUNTER — PROCEDURE VISIT (OUTPATIENT)
Dept: CARDIOLOGY CLINIC | Age: 48
End: 2023-02-23

## 2023-02-23 ENCOUNTER — NURSE ONLY (OUTPATIENT)
Dept: CARDIOLOGY CLINIC | Age: 48
End: 2023-02-23

## 2023-02-23 VITALS
DIASTOLIC BLOOD PRESSURE: 80 MMHG | HEART RATE: 95 BPM | WEIGHT: 106 LBS | SYSTOLIC BLOOD PRESSURE: 120 MMHG | HEIGHT: 66 IN | BODY MASS INDEX: 17.04 KG/M2

## 2023-02-23 DIAGNOSIS — R07.89 OTHER CHEST PAIN: Primary | ICD-10-CM

## 2023-02-23 DIAGNOSIS — R00.2 PALPITATIONS: Primary | ICD-10-CM

## 2023-02-23 NOTE — PROGRESS NOTES
14 days e-cardio monitor placed.  # [id number]. Instructed patient on how to record the event and to call monitoring center at [phone] if any problems arise. Instructed patient to disconnect the lead wires from the electrodes before bathing or showering and reattach them afterwards. Instructed patient that the electrodes should be changed every 3 days or if they no longer adhere to the skin. Patient to mail package after the monitor has ended. Patient verbalized understanding.

## 2023-02-27 ENCOUNTER — TELEPHONE (OUTPATIENT)
Dept: CARDIOLOGY CLINIC | Age: 48
End: 2023-02-27

## 2023-02-27 NOTE — TELEPHONE ENCOUNTER
Echo done on 2/23/2023:     Left ventricular function and size is normal, EF is estimated at 55-60%. No evidence of diastolic dysfunction. No regional wall motion abnormalities were detected. Mild tricuspid regurgitation; RVSP is 28 mmHg. No evidence of pericardial effusion. Patient given results of Echo, reminded patient of upcoming follow up with Dr. Iker Byrnes, Patient advised and voices understanding.

## 2023-03-02 ENCOUNTER — TELEPHONE (OUTPATIENT)
Dept: CARDIOLOGY CLINIC | Age: 48
End: 2023-03-02

## 2023-03-02 NOTE — TELEPHONE ENCOUNTER
Summary   Left ventricular function and size is normal, EF is estimated at 55-60%. No evidence of diastolic dysfunction. No regional wall motion abnormalities were detected. Mild tricuspid regurgitation; RVSP is 28 mmHg. No evidence of pericardial effusion. Spoke to pt regarding echo results. Patient advised and voices understanding.

## 2023-03-27 ENCOUNTER — OFFICE VISIT (OUTPATIENT)
Dept: CARDIOLOGY CLINIC | Age: 48
End: 2023-03-27
Payer: COMMERCIAL

## 2023-03-27 VITALS
WEIGHT: 108 LBS | SYSTOLIC BLOOD PRESSURE: 130 MMHG | DIASTOLIC BLOOD PRESSURE: 80 MMHG | HEIGHT: 66 IN | HEART RATE: 68 BPM | BODY MASS INDEX: 17.36 KG/M2

## 2023-03-27 DIAGNOSIS — R07.89 OTHER CHEST PAIN: Primary | ICD-10-CM

## 2023-03-27 PROCEDURE — 99214 OFFICE O/P EST MOD 30 MIN: CPT | Performed by: INTERNAL MEDICINE

## 2023-03-27 PROCEDURE — G8427 DOCREV CUR MEDS BY ELIG CLIN: HCPCS | Performed by: INTERNAL MEDICINE

## 2023-03-27 PROCEDURE — G8484 FLU IMMUNIZE NO ADMIN: HCPCS | Performed by: INTERNAL MEDICINE

## 2023-03-27 PROCEDURE — 4004F PT TOBACCO SCREEN RCVD TLK: CPT | Performed by: INTERNAL MEDICINE

## 2023-03-27 PROCEDURE — G8419 CALC BMI OUT NRM PARAM NOF/U: HCPCS | Performed by: INTERNAL MEDICINE

## 2023-03-27 NOTE — PROGRESS NOTES
John Gaspar MD        OFFICE  FOLLOWUP NOTE    Chief complaints: patient is here for management of chest pain, asthma, GERD, palpitations, schizophrenia, left foot callus, anxiety    Subjective: patient feels better, + chest pain, no shortness of breath, no dizziness, no palpitations    HPI Li Cao is a 52 y. o.year old who  has a past medical history of Asthma, Bipolar disorder ([address] Depression, Left tubo-ovarian abscess, Migraine, Pelvic peritoneal adhesions, female (postoperative) (postinfection), Schizophrenia ([address] and Scoliosis. and presents for management of chest pain, asthma, GERD, palpitations, schizophrenia, left foot callus, anxiety, which are well controlled      Current Outpatient Medications   Medication Sig Dispense Refill    acetaminophen (TYLENOL) 325 MG tablet Take 2 tablets by mouth every 6 hours as needed for Pain 120 tablet 3    Magic Mouthwash (MIRACLE MOUTHWASH) Swish and spit 5 mLs 4 times daily as needed for Irritation 240 mL 0    benzocaine (ORAJEL) 10 % mucosal gel Take by mouth as needed. 9 g 0    butalbital-aspirin-caffeine (FIORINAL) -40 MG capsule Take 1 capsule by mouth every 6 hours as needed for Headaches for up to 1 day.  5 capsule 0    NARCAN 4 MG/0.1ML LIQD nasal spray DISPENSED PER STANDING ORDER USE AS DIRECTED PATIENT IS TRAINED OPIOID OVERDOSE RESPONDER      ondansetron (ZOFRAN-ODT) 4 MG disintegrating tablet dissolve 1 tablet ON TONGUE once daily if needed for nausea OR vomiting      dicyclomine (BENTYL) 10 MG capsule Take 1 capsule by mouth 3 times daily (before meals) 270 capsule 3    albuterol sulfate  (90 Base) MCG/ACT inhaler inhale 1 puff by mouth and INTO THE LUNGS every 4 hours      cetirizine (ZYRTEC) 10 MG tablet take 1 tablet by mouth once daily      Lactobacillus (ACIDOPHILUS PROBIOTIC) 0.5 MG TABS take 1 tablet by mouth once daily      pantoprazole (PROTONIX) 20 MG tablet Take 1 tablet by mouth daily 30 tablet 0

## 2023-05-01 ENCOUNTER — HOSPITAL ENCOUNTER (OUTPATIENT)
Dept: WOMENS IMAGING | Age: 48
Discharge: HOME | End: 2023-05-01
Payer: COMMERCIAL

## 2023-05-01 DIAGNOSIS — M81.0 AGE-RELATED OSTEOPOROSIS WITHOUT CURRENT PATHOLOGICAL FRACTURE: ICD-10-CM

## 2023-05-01 DIAGNOSIS — Z12.31 ENCOUNTER FOR SCREENING MAMMOGRAM FOR MALIGNANT NEOPLASM OF BREAST: ICD-10-CM

## 2023-05-01 PROCEDURE — 77063 BREAST TOMOSYNTHESIS BI: CPT

## 2023-05-01 PROCEDURE — 77080 DXA BONE DENSITY AXIAL: CPT

## 2023-06-08 ENCOUNTER — HOSPITAL ENCOUNTER (OUTPATIENT)
Age: 48
Discharge: HOME | End: 2023-06-08
Payer: COMMERCIAL

## 2023-06-08 LAB
ERYTHROCYTE SEDIMENTATION RATE: 6 MM/HR (ref 0–20)
ESTIMATED AVERAGE GLUCOSE: 120 MG/DL
HBA1C MFR BLD: 5.8 % (ref 4.2–6.3)
HCT VFR BLD CALC: 48.3 % (ref 37–47)
HEMOGLOBIN: 15.6 GM/DL (ref 12.5–16)
MCH RBC QN AUTO: 32.1 PG (ref 27–31)
MCHC RBC AUTO-ENTMCNC: 32.3 % (ref 32–36)
MCV RBC AUTO: 99.4 FL (ref 78–100)
PDW BLD-RTO: 12.4 % (ref 11.7–14.9)
PLATELET # BLD: 196 K/CU MM (ref 140–440)
PMV BLD AUTO: 10.7 FL (ref 7.5–11.1)
RBC # BLD: 4.86 M/CU MM (ref 4.2–5.4)
WBC # BLD: 4.3 K/CU MM (ref 4–10.5)

## 2023-06-08 PROCEDURE — 85027 COMPLETE CBC AUTOMATED: CPT

## 2023-06-08 PROCEDURE — 80076 HEPATIC FUNCTION PANEL: CPT

## 2023-06-08 PROCEDURE — 84439 ASSAY OF FREE THYROXINE: CPT

## 2023-06-08 PROCEDURE — 36415 COLL VENOUS BLD VENIPUNCTURE: CPT

## 2023-06-08 PROCEDURE — 83036 HEMOGLOBIN GLYCOSYLATED A1C: CPT

## 2023-06-08 PROCEDURE — 84443 ASSAY THYROID STIM HORMONE: CPT

## 2023-06-08 PROCEDURE — 82306 VITAMIN D 25 HYDROXY: CPT

## 2023-06-08 PROCEDURE — 85652 RBC SED RATE AUTOMATED: CPT

## 2023-06-08 PROCEDURE — 80061 LIPID PANEL: CPT

## 2024-04-07 ENCOUNTER — APPOINTMENT (OUTPATIENT)
Dept: ULTRASOUND IMAGING | Age: 49
End: 2024-04-07
Payer: COMMERCIAL

## 2024-04-07 ENCOUNTER — HOSPITAL ENCOUNTER (EMERGENCY)
Age: 49
Discharge: HOME | End: 2024-04-07
Payer: COMMERCIAL

## 2024-04-07 ENCOUNTER — APPOINTMENT (OUTPATIENT)
Dept: GENERAL RADIOLOGY | Age: 49
End: 2024-04-07
Payer: COMMERCIAL

## 2024-04-07 VITALS
RESPIRATION RATE: 16 BRPM | TEMPERATURE: 98.4 F | OXYGEN SATURATION: 100 % | HEART RATE: 63 BPM | DIASTOLIC BLOOD PRESSURE: 95 MMHG | SYSTOLIC BLOOD PRESSURE: 158 MMHG

## 2024-04-07 DIAGNOSIS — M54.50 CHRONIC LOW BACK PAIN WITHOUT SCIATICA, UNSPECIFIED BACK PAIN LATERALITY: ICD-10-CM

## 2024-04-07 DIAGNOSIS — G89.29 CHRONIC LOW BACK PAIN WITHOUT SCIATICA, UNSPECIFIED BACK PAIN LATERALITY: ICD-10-CM

## 2024-04-07 DIAGNOSIS — M79.602 ARM PAIN, LEFT: Primary | ICD-10-CM

## 2024-04-07 LAB
BILIRUBIN URINE: NEGATIVE MG/DL
BLOOD, URINE: NEGATIVE
CLARITY: CLEAR
COLOR: YELLOW
COMMENT UA: NORMAL
GLUCOSE, URINE: NEGATIVE MG/DL
KETONES, URINE: NEGATIVE MG/DL
LEUKOCYTE ESTERASE, URINE: NEGATIVE
NITRITE URINE, QUANTITATIVE: NEGATIVE
PH, URINE: 6.5 (ref 5–8)
PROTEIN UA: NEGATIVE MG/DL
SPECIFIC GRAVITY UA: 1.02 (ref 1–1.03)
UROBILINOGEN, URINE: 0.2 MG/DL (ref 0.2–1)

## 2024-04-07 PROCEDURE — 99284 EMERGENCY DEPT VISIT MOD MDM: CPT

## 2024-04-07 PROCEDURE — 72110 X-RAY EXAM L-2 SPINE 4/>VWS: CPT

## 2024-04-07 PROCEDURE — 81003 URINALYSIS AUTO W/O SCOPE: CPT

## 2024-04-07 PROCEDURE — 93971 EXTREMITY STUDY: CPT

## 2024-04-07 NOTE — ED PROVIDER NOTES
noted  Psych: Mentation is grossly normal cognition is grossly normal. Affect is appropriate  Neuro: Motor intact sensory intact cranial nerves II through XII are intact level of consciousness is normal cerebellar function is normal reflexes are grossly normal. No evidence of incontinence or loss of bowel or bladder no saddle anesthesia noted Lymphatic: There is no submandibular or cervical adenopathy appreciated.      I have reviewed and interpreted all of the currently available lab results from this visit (if applicable):  No results found for this visit on 04/07/24.   Radiographs (if obtained):  [] The following radiograph was interpreted by myself in the absence of a radiologist:   [] Radiologist's Report Reviewed:  XR LUMBAR SPINE (MIN 4 VIEWS)   Final Result   1. No acute abnormality of the lumbar spine.      Electronically signed by Jon Salvador MD      Vascular duplex upper extremity venous left   Final Result      1. No evidence of deep vein thrombosis in theleft upper extremity with normal flow, waveforms, compressibility and distal augmentation documented.          Electronically signed by Jon Salvador MD          EKG (if obtained):   Please See Note of attending physician for EKG interpretation.     Chart review shows recent radiograph(s):  No results found.    MDM:     Problems Addressed:  1. Arm pain, left    2. Chronic low back pain without sciatica, unspecified back pain laterality        Interventions given this visit: No orders of the defined types were placed in this encounter.     Interventions listed are used to treat Problem list above    Images independently interpreted by me:  L spine xray: no acute process      -CHRONIC DIAGNOSIS AFFECTING CARE.     -DDX   Dvt, strain, tendonitis.     Well-appearing woman presents today to the emergency department neurovascular intact on initial and repeat examination.  For left-sided arm pain.  Ongoing for quite some time.  Examination does reveal

## 2024-04-29 NOTE — PROGRESS NOTES
Admit Date: 1/21/2020  Hospital day 3    Subjective:       No improvement in pain. Pain is 8-10/10. Scheduled Meds:   magnesium oxide  400 mg Oral TID    doxycycline (VIBRAMYCIN) IV  100 mg Intravenous Q12H    QUEtiapine  100 mg Oral Nightly    multivitamin  1 tablet Oral Daily    rOPINIRole  1 mg Oral Nightly    fluticasone  1 spray Nasal Daily    sodium chloride flush  10 mL Intravenous BID    enoxaparin  40 mg Subcutaneous Daily    meropenem  2 g Intravenous Q8H    vancomycin  1,000 mg Intravenous Q12H     Continuous Infusions:   sodium chloride 100 mL/hr at 01/23/20 0214     PRN Meds:LORazepam, HYDROmorphone, ondansetron, prochlorperazine, diphenhydrAMINE, albuterol sulfate HFA, oxyCODONE-acetaminophen, LORazepam, sodium chloride flush, magnesium hydroxide, ondansetron        Objective:     Patient Vitals for the past 8 hrs:   BP Temp Temp src Pulse Resp SpO2   01/23/20 0915 123/82 98.3 °F (36.8 °C) Oral 80 17 99 %   01/23/20 0830 131/72 100.1 °F (37.8 °C) Oral 96 18 100 %   01/23/20 0635 124/89 98.8 °F (37.1 °C) Oral 106 20 100 %     No intake/output data recorded. I/O this shift:  In: -   Out: 500 [Urine:500]    General appearance: alert, appears stated age and cooperative  Appears uncomfortable  Neck: no adenopathy, supple, symmetrical, trachea midline and thyroid not enlarged, symmetric, no tenderness/mass/nodules  Lungs: clear to auscultation bilaterally  Heart: regular rate and rhythm  Abdomen: soft, significant tenderness diffusely greatest in the RLQ  Extremities: extremities normal, atraumatic, no cyanosis or edema    Data Review  01/23/2020 0816 01/23/2020 1011 CBC Auto Differential [[id number]]   (Abnormal)   Blood    WBC 17. 7High  K/CU MM   RBC 3.75Low  M/CU MM   Hemoglobin Quant 11.9Low  GM/DL   Hematocrit 37.3 %   MCV 99.5 FL   MCH 31.7High  PG   MCHC 31.9Low  %   RDW 12.8 %   Platelet Count 786 K/CU MM   MPV 10.0 FL   Immature Neutrophil % 0.9High  %   Segs Relative 88. 5High  % We are committed to providing our patients with their test results in a timely manner. If you have access to [facility], you will receive your results within 5 to 7 days. If your results are normal, a member of our office may call you or you may receive a letter in the mail within 10 to 15 days of your testing. If your results have something additional to discuss, a member of our office will contact you by phone. If at any time you have questions related your results, please feel free to call our office at [phone]   Eosinophils % 0.2 %   Basophils % 0.2 %   Lymphocyte % 3.4Low  %   Monocytes % 6.8High  %   Total Immature Neutrophil 0.16 K/CU MM   Segs Absolute 15.7 K/CU MM   Eosinophils Absolute 0.0 K/CU MM   Basophils Absolute 0.0 K/CU MM   Lymphocytes Absolute 0.6 K/CU MM   Monocytes Absolute 1.2 K/CU MM   Differential Type AUTOMATED DIFF RESULTS CONFIRMED BY SMEAR REVIEW   AUTOMATED DIFFERENTIAL             01/23/2020 0816 01/23/2020 0916 Lactic acid, plasma [[id number]]   Blood    Lactate, ser/plas 1.3 mMOL/L             1/23/2020       TECHNIQUE:   Transabdominal pelvic ultrasound was performed.       COMPARISON:   None       HISTORY:   ORDERING SYSTEM PROVIDED HISTORY: probable tuboovarian abscess, if not   significantly smaller, I will perform surgery this afternoon as IR states not   amendable to Ct guidied drainage   TECHNOLOGIST PROVIDED HISTORY:   Reason for exam:->probable tuboovarian abscess, if not significantly smaller,   I will perform surgery this afternoon as IR states not amendable to Ct   guidied drainage   Reason for Exam: reassess tubovarian abscess   Acuity: Acute   Type of Exam: Initial   Additional signs and symptoms: nk   Relevant Medical/Surgical History: nk       Follow-up exam       FINDINGS:   Within the pelvis are complex collections of fluid, the largest measuring 4.4   x 7.3 cm.  There are also multiple tubular structures within the pelvis,   which also contain echogenic fluid/debris.  There does not appear to be any   improvement in this fluid, which was previously present.           Impression   Today's pelvic ultrasound demonstrates multiple complex fluid collections,   some of which are tubular, which are concerning for tubo-ovarian abscesses,   less likely, cysts and hydrosalpinx.  When reviewed with the previous CT,   these do not appear amenable to percutaneous needle drainage.  Surgery will   likely be necessary for resolution. Assessment:     1.  Tuboovarian abscess with no

## 2024-09-06 ENCOUNTER — HOSPITAL ENCOUNTER (OUTPATIENT)
Age: 49
Discharge: HOME | End: 2024-09-06
Payer: COMMERCIAL

## 2024-09-06 ENCOUNTER — HOSPITAL ENCOUNTER (OUTPATIENT)
Dept: GENERAL RADIOLOGY | Age: 49
Discharge: HOME | End: 2024-09-06
Payer: COMMERCIAL

## 2024-09-06 DIAGNOSIS — M25.522 PAIN IN LEFT ELBOW: ICD-10-CM

## 2024-09-06 LAB
25(OH)D3 SERPL-MCNC: 17.85 NG/ML
ALBUMIN SERPL-MCNC: 4.6 GM/DL (ref 3.4–5)
ALP BLD-CCNC: 128 IU/L (ref 40–129)
ALT SERPL-CCNC: 15 U/L (ref 10–40)
ANION GAP SERPL CALCULATED.3IONS-SCNC: 18 MMOL/L (ref 7–16)
AST SERPL-CCNC: 29 IU/L (ref 15–37)
BILIRUB SERPL-MCNC: 0.3 MG/DL (ref 0–1)
BILIRUBIN DIRECT: 0.2 MG/DL (ref 0–0.3)
BILIRUBIN, INDIRECT: 0.1 MG/DL (ref 0–0.7)
BILIRUBIN, URINE: NEGATIVE MG/DL
BLOOD, URINE: NEGATIVE
BUN SERPL-MCNC: 12 MG/DL (ref 6–23)
CALCIUM SERPL-MCNC: 9.5 MG/DL (ref 8.3–10.6)
CHLORIDE BLD-SCNC: 103 MMOL/L (ref 99–110)
CHOLESTEROL, FASTING: 235 MG/DL
CLARITY, UA: CLEAR
CO2: 19 MMOL/L (ref 21–32)
COLOR, UA: YELLOW
COMMENT UA: NORMAL
CREAT SERPL-MCNC: 1.1 MG/DL (ref 0.6–1.1)
GFR, ESTIMATED: 62 ML/MIN/1.73M2
GLUCOSE SERPL-MCNC: 93 MG/DL (ref 70–99)
GLUCOSE URINE: NEGATIVE MG/DL
HCT VFR BLD CALC: 48.8 % (ref 37–47)
HDLC SERPL-MCNC: 88 MG/DL
HEMOGLOBIN: 14.7 GM/DL (ref 12.5–16)
KETONES, URINE: NEGATIVE MG/DL
LDLC SERPL CALC-MCNC: 121 MG/DL
LEUKOCYTE ESTERASE, URINE: NEGATIVE
MAGNESIUM: 2.6 MG/DL (ref 1.8–2.4)
MCH RBC QN AUTO: 33.1 PG (ref 27–31)
MCHC RBC AUTO-ENTMCNC: 30.1 % (ref 32–36)
MCV RBC AUTO: 109.9 FL (ref 78–100)
NITRITE URINE, QUANTITATIVE: NEGATIVE
PDW BLD-RTO: 14.2 % (ref 11.7–14.9)
PH, URINE: 6 (ref 5–8)
PLATELET # BLD: 205 K/CU MM (ref 140–440)
PMV BLD AUTO: 9.7 FL (ref 7.5–11.1)
POTASSIUM SERPL-SCNC: 4.5 MMOL/L (ref 3.5–5.1)
PROTEIN UA: NEGATIVE MG/DL
RBC # BLD: 4.44 M/CU MM (ref 4.2–5.4)
SODIUM BLD-SCNC: 140 MMOL/L (ref 135–145)
SPECIFIC GRAVITY UA: 1.02 (ref 1–1.03)
T4 FREE SERPL-MCNC: 0.89 NG/DL (ref 0.9–1.8)
TOTAL PROTEIN: 8.3 GM/DL (ref 6.4–8.2)
TRIGLYCERIDE, FASTING: 130 MG/DL
TSH SERPL DL<=0.005 MIU/L-ACNC: 0.98 UIU/ML (ref 0.27–4.2)
URATE SERPL-MCNC: 5.6 MG/DL (ref 2.6–6)
UROBILINOGEN, URINE: 0.2 MG/DL (ref 0.2–1)
WBC # BLD: 4.9 K/CU MM (ref 4–10.5)

## 2024-09-06 PROCEDURE — 73080 X-RAY EXAM OF ELBOW: CPT

## 2024-09-06 PROCEDURE — 80053 COMPREHEN METABOLIC PANEL: CPT

## 2024-09-06 PROCEDURE — 36415 COLL VENOUS BLD VENIPUNCTURE: CPT

## 2024-09-06 PROCEDURE — 85652 RBC SED RATE AUTOMATED: CPT

## 2024-09-06 PROCEDURE — 83735 ASSAY OF MAGNESIUM: CPT

## 2024-09-06 PROCEDURE — 84439 ASSAY OF FREE THYROXINE: CPT

## 2024-09-06 PROCEDURE — 81003 URINALYSIS AUTO W/O SCOPE: CPT

## 2024-09-06 PROCEDURE — 84550 ASSAY OF BLOOD/URIC ACID: CPT

## 2024-09-06 PROCEDURE — 83036 HEMOGLOBIN GLYCOSYLATED A1C: CPT

## 2024-09-06 PROCEDURE — 85027 COMPLETE CBC AUTOMATED: CPT

## 2024-09-06 PROCEDURE — 93005 ELECTROCARDIOGRAM TRACING: CPT | Performed by: FAMILY MEDICINE

## 2024-09-06 PROCEDURE — 82248 BILIRUBIN DIRECT: CPT

## 2024-09-06 PROCEDURE — 82306 VITAMIN D 25 HYDROXY: CPT

## 2024-09-06 PROCEDURE — 84443 ASSAY THYROID STIM HORMONE: CPT

## 2024-09-06 PROCEDURE — 80061 LIPID PANEL: CPT

## 2024-09-07 LAB
EKG ATRIAL RATE: 55 BPM
EKG DIAGNOSIS: NORMAL
EKG P AXIS: 25 DEGREES
EKG P-R INTERVAL: 142 MS
EKG Q-T INTERVAL: 444 MS
EKG QRS DURATION: 80 MS
EKG QTC CALCULATION (BAZETT): 424 MS
EKG R AXIS: 45 DEGREES
EKG T AXIS: 37 DEGREES
EKG VENTRICULAR RATE: 55 BPM

## 2024-12-20 ENCOUNTER — HOSPITAL ENCOUNTER (OUTPATIENT)
Dept: MRI IMAGING | Age: 49
Discharge: HOME | End: 2024-12-20
Attending: FAMILY MEDICINE
Payer: COMMERCIAL

## 2024-12-20 ENCOUNTER — HOSPITAL ENCOUNTER (OUTPATIENT)
Age: 49
Setting detail: SPECIMEN
Discharge: HOME | End: 2024-12-20
Payer: COMMERCIAL

## 2024-12-20 ENCOUNTER — HOSPITAL ENCOUNTER (OUTPATIENT)
Dept: WOMENS IMAGING | Age: 49
Discharge: HOME | End: 2024-12-20
Attending: FAMILY MEDICINE
Payer: COMMERCIAL

## 2024-12-20 DIAGNOSIS — M51.16 INTERVERTEBRAL DISC DISORDER WITH RADICULOPATHY OF LUMBAR REGION: ICD-10-CM

## 2024-12-20 DIAGNOSIS — Z12.31 ENCOUNTER FOR SCREENING MAMMOGRAM FOR MALIGNANT NEOPLASM OF BREAST: ICD-10-CM

## 2024-12-20 LAB
BILIRUB UR QL STRIP: NEGATIVE
CLARITY UR: CLEAR
COLOR UR: YELLOW
GLUCOSE UR STRIP-MCNC: NEGATIVE MG/DL
HGB UR QL STRIP.AUTO: NEGATIVE
KETONES UR STRIP-MCNC: NEGATIVE MG/DL
LEUKOCYTE ESTERASE UR QL STRIP: NEGATIVE
NITRITE UR QL STRIP: POSITIVE
PH UR STRIP: 6 [PH] (ref 5–8)
PROT UR STRIP-MCNC: NEGATIVE MG/DL
SP GR UR STRIP: 1.02 (ref 1–1.03)
UROBILINOGEN UR STRIP-ACNC: 0.2 EU/DL (ref 0–1)

## 2024-12-20 PROCEDURE — 81001 URINALYSIS AUTO W/SCOPE: CPT

## 2024-12-20 PROCEDURE — 77067 SCR MAMMO BI INCL CAD: CPT

## 2024-12-20 PROCEDURE — 72148 MRI LUMBAR SPINE W/O DYE: CPT

## 2024-12-21 LAB
BACTERIA URNS QL MICRO: ABNORMAL
RBC #/AREA URNS HPF: ABNORMAL /HPF
WBC #/AREA URNS HPF: ABNORMAL /HPF

## 2025-06-02 ENCOUNTER — APPOINTMENT (OUTPATIENT)
Dept: GENERAL RADIOLOGY | Age: 50
End: 2025-06-02
Payer: COMMERCIAL

## 2025-06-02 ENCOUNTER — HOSPITAL ENCOUNTER (EMERGENCY)
Age: 50
Discharge: HOME | End: 2025-06-02
Attending: EMERGENCY MEDICINE
Payer: COMMERCIAL

## 2025-06-02 VITALS
HEART RATE: 73 BPM | OXYGEN SATURATION: 98 % | BODY MASS INDEX: 17.68 KG/M2 | DIASTOLIC BLOOD PRESSURE: 108 MMHG | TEMPERATURE: 98 F | RESPIRATION RATE: 16 BRPM | WEIGHT: 110 LBS | HEIGHT: 66 IN | SYSTOLIC BLOOD PRESSURE: 165 MMHG

## 2025-06-02 DIAGNOSIS — M25.552 LEFT HIP PAIN: Primary | ICD-10-CM

## 2025-06-02 DIAGNOSIS — T14.8XXA OPEN WOUND: ICD-10-CM

## 2025-06-02 PROCEDURE — 73502 X-RAY EXAM HIP UNI 2-3 VIEWS: CPT

## 2025-06-02 PROCEDURE — 99283 EMERGENCY DEPT VISIT LOW MDM: CPT

## 2025-06-02 RX ORDER — GINSENG 100 MG
CAPSULE ORAL
Qty: 1 EACH | Refills: 0 | Status: SHIPPED | OUTPATIENT
Start: 2025-06-02 | End: 2025-06-12

## 2025-06-02 RX ORDER — ACETAMINOPHEN 500 MG
500 TABLET ORAL 4 TIMES DAILY PRN
Qty: 120 TABLET | Refills: 0 | Status: SHIPPED | OUTPATIENT
Start: 2025-06-02

## 2025-06-02 ASSESSMENT — PAIN DESCRIPTION - ORIENTATION: ORIENTATION: LEFT

## 2025-06-02 ASSESSMENT — PAIN DESCRIPTION - LOCATION: LOCATION: HIP

## 2025-06-02 ASSESSMENT — PAIN - FUNCTIONAL ASSESSMENT: PAIN_FUNCTIONAL_ASSESSMENT: 0-10

## 2025-06-02 ASSESSMENT — PAIN SCALES - GENERAL: PAINLEVEL_OUTOF10: 8

## 2025-06-02 NOTE — ED TRIAGE NOTES
Patient presents with c/o left hip pain. States she fell 5/12/25. She adds she is unable to sleep at night, lay on her side due to the pain.

## 2025-06-20 ENCOUNTER — TRANSCRIBE ORDERS (OUTPATIENT)
Dept: ADMINISTRATIVE | Age: 50
End: 2025-06-20

## 2025-06-20 DIAGNOSIS — M25.552 LEFT HIP PAIN: Primary | ICD-10-CM

## 2025-06-24 ENCOUNTER — HOSPITAL ENCOUNTER (OUTPATIENT)
Age: 50
Discharge: HOME | End: 2025-06-24
Payer: COMMERCIAL

## 2025-06-24 LAB
25(OH)D3 SERPL-MCNC: 13.4 NG/ML (ref 30–150)
ALBUMIN SERPL-MCNC: 4.2 G/DL (ref 3.4–5)
ALBUMIN/GLOB SERPL: 1.6 {RATIO} (ref 1.1–2.2)
ALP SERPL-CCNC: 113 U/L (ref 40–129)
ALT SERPL-CCNC: 13 U/L (ref 10–40)
ANION GAP SERPL CALCULATED.3IONS-SCNC: 12 MMOL/L (ref 9–17)
AST SERPL-CCNC: 29 U/L (ref 15–37)
BILIRUB DIRECT SERPL-MCNC: <0.2 MG/DL (ref 0–0.3)
BILIRUB INDIRECT SERPL-MCNC: NORMAL MG/DL (ref 0–0.7)
BILIRUB SERPL-MCNC: 0.5 MG/DL (ref 0–1)
BILIRUB UR QL STRIP: NEGATIVE
BUN SERPL-MCNC: 12 MG/DL (ref 7–20)
CALCIUM SERPL-MCNC: 9.3 MG/DL (ref 8.3–10.6)
CHLORIDE SERPL-SCNC: 107 MMOL/L (ref 99–110)
CHOLEST SERPL-MCNC: 227 MG/DL (ref 125–199)
CLARITY UR: ABNORMAL
CO2 SERPL-SCNC: 23 MMOL/L (ref 21–32)
COLOR UR: YELLOW
CREAT SERPL-MCNC: 1.1 MG/DL (ref 0.6–1.1)
EPI CELLS #/AREA URNS HPF: 5 /HPF
ERYTHROCYTE [DISTWIDTH] IN BLOOD BY AUTOMATED COUNT: 14.1 % (ref 11.7–14.9)
ERYTHROCYTE [SEDIMENTATION RATE] IN BLOOD BY WESTERGREN METHOD: 13 MM/HR (ref 0–30)
EST. AVERAGE GLUCOSE BLD GHB EST-MCNC: 103 MG/DL
GFR, ESTIMATED: 52 ML/MIN/1.73M2
GLUCOSE SERPL-MCNC: 87 MG/DL (ref 74–99)
GLUCOSE UR STRIP-MCNC: NEGATIVE MG/DL
HBA1C MFR BLD: 5.2 % (ref 4.2–6.3)
HCT VFR BLD AUTO: 42.4 % (ref 37–47)
HDLC SERPL-MCNC: 73 MG/DL
HGB BLD-MCNC: 13.7 G/DL (ref 12.5–16)
HGB UR QL STRIP.AUTO: NEGATIVE
KETONES UR STRIP-MCNC: NEGATIVE MG/DL
LDLC SERPL CALC-MCNC: 122 MG/DL
LEUKOCYTE ESTERASE UR QL STRIP: NEGATIVE
MAGNESIUM SERPL-MCNC: 2.1 MG/DL (ref 1.8–2.4)
MCH RBC QN AUTO: 33.4 PG (ref 27–31)
MCHC RBC AUTO-ENTMCNC: 32.3 G/DL (ref 32–36)
MCV RBC AUTO: 103.4 FL (ref 78–100)
MUCOUS THREADS URNS QL MICRO: ABNORMAL
NITRITE UR QL STRIP: POSITIVE
PH UR STRIP: 6 [PH] (ref 5–8)
PLATELET # BLD AUTO: 211 K/UL (ref 140–440)
PMV BLD AUTO: 11.2 FL (ref 7.5–11.1)
POTASSIUM SERPL-SCNC: 3.7 MMOL/L (ref 3.5–5.1)
PROT SERPL-MCNC: 6.8 G/DL (ref 6.4–8.2)
PROT UR STRIP-MCNC: ABNORMAL MG/DL
RBC # BLD AUTO: 4.1 M/UL (ref 4.2–5.4)
RBC #/AREA URNS HPF: 2 /HPF (ref 0–2)
SODIUM SERPL-SCNC: 141 MMOL/L (ref 136–145)
SP GR UR STRIP: 1.02 (ref 1–1.03)
T4 FREE SERPL-MCNC: 0.8 NG/DL (ref 0.9–1.8)
TRIGL SERPL-MCNC: 164 MG/DL
TSH SERPL DL<=0.05 MIU/L-ACNC: 1.43 UIU/ML (ref 0.27–4.2)
URATE SERPL-MCNC: 5.5 MG/DL (ref 2.6–6)
UROBILINOGEN UR STRIP-ACNC: 0.2 EU/DL (ref 0–1)
WBC #/AREA URNS HPF: 4 /HPF (ref 0–5)
WBC OTHER # BLD: 4.3 K/UL (ref 4–10.5)

## 2025-06-24 PROCEDURE — 83735 ASSAY OF MAGNESIUM: CPT

## 2025-06-24 PROCEDURE — 83036 HEMOGLOBIN GLYCOSYLATED A1C: CPT

## 2025-06-24 PROCEDURE — 85027 COMPLETE CBC AUTOMATED: CPT

## 2025-06-24 PROCEDURE — 80053 COMPREHEN METABOLIC PANEL: CPT

## 2025-06-24 PROCEDURE — 84443 ASSAY THYROID STIM HORMONE: CPT

## 2025-06-24 PROCEDURE — 80061 LIPID PANEL: CPT

## 2025-06-24 PROCEDURE — 81001 URINALYSIS AUTO W/SCOPE: CPT

## 2025-06-24 PROCEDURE — 82306 VITAMIN D 25 HYDROXY: CPT

## 2025-06-24 PROCEDURE — 82248 BILIRUBIN DIRECT: CPT

## 2025-06-24 PROCEDURE — 84439 ASSAY OF FREE THYROXINE: CPT

## 2025-06-24 PROCEDURE — 85652 RBC SED RATE AUTOMATED: CPT

## 2025-06-24 PROCEDURE — 84550 ASSAY OF BLOOD/URIC ACID: CPT

## 2025-07-03 ENCOUNTER — HOSPITAL ENCOUNTER (OUTPATIENT)
Dept: CT IMAGING | Age: 50
Discharge: HOME | End: 2025-07-03
Attending: FAMILY MEDICINE
Payer: COMMERCIAL

## 2025-07-03 DIAGNOSIS — M25.552 LEFT HIP PAIN: ICD-10-CM

## 2025-07-03 PROCEDURE — 73700 CT LOWER EXTREMITY W/O DYE: CPT

## 2025-07-29 ENCOUNTER — TRANSCRIBE ORDERS (OUTPATIENT)
Dept: ADMINISTRATIVE | Age: 50
End: 2025-07-29

## 2025-07-29 DIAGNOSIS — D44.10 NEOPLASM OF UNCERTAIN BEHAVIOR OF ADRENAL GLAND, UNSPECIFIED LATERALITY: Primary | ICD-10-CM

## 2025-08-14 ENCOUNTER — HOSPITAL ENCOUNTER (OUTPATIENT)
Dept: MRI IMAGING | Age: 50
Discharge: HOME | End: 2025-08-14
Attending: FAMILY MEDICINE
Payer: COMMERCIAL

## 2025-08-14 DIAGNOSIS — M19.90 OSTEOARTHRITIS, UNSPECIFIED OSTEOARTHRITIS TYPE, UNSPECIFIED SITE: ICD-10-CM

## 2025-08-14 DIAGNOSIS — M54.51 VERTEBROGENIC LOW BACK PAIN: ICD-10-CM

## 2025-08-14 DIAGNOSIS — M51.369 OTHER INTERVERTEBRAL DISC DEGENERATION, LUMBAR REGION: ICD-10-CM

## 2025-08-14 PROCEDURE — 72148 MRI LUMBAR SPINE W/O DYE: CPT

## (undated) DEVICE — CONTAINER,SPECIMEN,OR STERILE,4OZ: Brand: MEDLINE

## (undated) DEVICE — SPONGE,PEANUT,XRAY,ST,SM,3/8",5/CARD: Brand: MEDLINE INDUSTRIES, INC.

## (undated) DEVICE — TOWEL,OR,DSP,ST,BLUE,STD,6/PK,12PK/CS: Brand: MEDLINE

## (undated) DEVICE — RESERVOIR,SUCTION,100CC,SILICONE: Brand: MEDLINE

## (undated) DEVICE — SOLUTION IV IRRIG WATER 1000ML POUR BRL 2F7114

## (undated) DEVICE — CATHETER,URETHRAL,REDRUBBER,STRL,16FR: Brand: MEDLINE

## (undated) DEVICE — PAD MATERNITY CURITY ADH STRIP DISP

## (undated) DEVICE — GLOVE ORANGE PI 7   MSG9070

## (undated) DEVICE — TUBING INSUFFLATOR HEAT HI FLO SET PNEUMOCLEAR

## (undated) DEVICE — TRAY PREP DRY W/ PREM GLV 2 APPL 6 SPNG 2 UNDPD 1 OVERWRAP

## (undated) DEVICE — NEEDLE HYPO 23GA L1.5IN TURQ POLYPR HUB S STL THN WALL IM

## (undated) DEVICE — SUTURE MCRYL SZ 4-0 L27IN ABSRB UD L24MM PS-1 3/8 CIR PRIM Y935H

## (undated) DEVICE — BANDAGE ADH W1XL3IN NAT FAB WVN FLX DURABLE N ADH PD SEAL

## (undated) DEVICE — Z DUP USE 2257490 ADHESIVE SKIN CLSRE 036ML TPCL 2CTL CNCRLTE HIGH VSCSTY DRMB

## (undated) DEVICE — COUNTER NDL 30 COUNT FOAM STRP SGL MAG

## (undated) DEVICE — LINER,SEMI-RIGID,3000CC,50EA/CS: Brand: MEDLINE

## (undated) DEVICE — SYRINGE MED 10ML LUERLOCK TIP W/O SFTY DISP

## (undated) DEVICE — SOLUTION IV IRRIG POUR BRL 0.9% SODIUM CHL 2F7124

## (undated) DEVICE — ELECTRODE ES AD CRDLSS PT RET REM POLYHESIVE

## (undated) DEVICE — MARKER SURG SKIN UTIL REGULAR/FINE 2 TIP W/ RUL AND 9 LBL

## (undated) DEVICE — TROCAR: Brand: KII FIOS FIRST ENTRY

## (undated) DEVICE — GLOVE SURG SZ 65 CRM LTX FREE POLYISOPRENE POLYMER BEAD ANTI

## (undated) DEVICE — CATHETER,FOLEY,100%SILICONE,16FR,10ML,LF: Brand: MEDLINE

## (undated) DEVICE — SOLUTION IV 1000ML 0.9% SOD CHL FOR IRRIG PLAS CONT

## (undated) DEVICE — VIAL ACCESS CANNULA,SMART TIP: Brand: MONOJECT

## (undated) DEVICE — CHLORAPREP 26ML ORANGE

## (undated) DEVICE — GYN LAP PK

## (undated) DEVICE — DISCONTINUED USE 111569 BF APPLICATOR COTN TIP 6IN ST

## (undated) DEVICE — SET TBNG DISP TIP FOR AHTO

## (undated) DEVICE — SPONGE LAP W18XL18IN WHT COT 4 PLY FLD STRUNG RADPQ DISP ST

## (undated) DEVICE — YANKAUER,FLEXIBLE HANDLE,REGLR CAPACITY: Brand: MEDLINE INDUSTRIES, INC.

## (undated) DEVICE — SPONGE DRN W4XL4IN RAYON/POLYESTER 6 PLY NONWOVEN PRECUT

## (undated) DEVICE — PENCIL ES CRD L10FT HND SWCHING ROCK SWCH W/ EDGE COAT BLDE

## (undated) DEVICE — TUBING, SUCTION, 9/32" X 10', STRAIGHT: Brand: MEDLINE

## (undated) DEVICE — DRAIN SURG 15FR SIL RND CHN W/ TRCR FULL FLUT DBL WRP TRAD

## (undated) DEVICE — GAUZE,SPONGE,4"X4",16PLY,XRAY,STRL,LF: Brand: MEDLINE

## (undated) DEVICE — PAD GZ BORD ST 4INX10IN 2X8IN

## (undated) DEVICE — STRIP,CLOSURE,WOUND,MEDI-STRIP,1/2X4: Brand: MEDLINE

## (undated) DEVICE — TROCAR: Brand: KII® SLEEVE

## (undated) DEVICE — GLOVE ORANGE PI 7 1/2   MSG9075

## (undated) DEVICE — INTENDED FOR TISSUE SEPARATION, AND OTHER PROCEDURES THAT REQUIRE A SHARP SURGICAL BLADE TO PUNCTURE OR CUT.: Brand: BARD-PARKER ® STAINLESS STEEL BLADES

## (undated) DEVICE — GOWN,SIRUS,POLYRNF,BRTHSLV,XLN/XL,20/CS: Brand: MEDLINE

## (undated) DEVICE — PAD,SANITARY,11 IN,MAXI,W/WINGS,N-STRL: Brand: MEDLINE